# Patient Record
Sex: MALE | Race: OTHER | NOT HISPANIC OR LATINO
[De-identification: names, ages, dates, MRNs, and addresses within clinical notes are randomized per-mention and may not be internally consistent; named-entity substitution may affect disease eponyms.]

---

## 2017-01-06 ENCOUNTER — RECORD ABSTRACTING (OUTPATIENT)
Age: 52
End: 2017-01-06

## 2017-01-06 PROBLEM — Z00.00 ENCOUNTER FOR PREVENTIVE HEALTH EXAMINATION: Status: ACTIVE | Noted: 2017-01-06

## 2017-06-19 ENCOUNTER — OUTPATIENT (OUTPATIENT)
Dept: OUTPATIENT SERVICES | Facility: HOSPITAL | Age: 52
LOS: 1 days | Discharge: HOME | End: 2017-06-19

## 2017-06-28 DIAGNOSIS — E78.5 HYPERLIPIDEMIA, UNSPECIFIED: ICD-10-CM

## 2017-07-19 ENCOUNTER — OUTPATIENT (OUTPATIENT)
Dept: OUTPATIENT SERVICES | Facility: HOSPITAL | Age: 52
LOS: 1 days | Discharge: HOME | End: 2017-07-19

## 2017-07-19 DIAGNOSIS — M25.551 PAIN IN RIGHT HIP: ICD-10-CM

## 2017-07-19 DIAGNOSIS — M54.5 LOW BACK PAIN: ICD-10-CM

## 2018-10-06 ENCOUNTER — OUTPATIENT (OUTPATIENT)
Dept: OUTPATIENT SERVICES | Facility: HOSPITAL | Age: 53
LOS: 1 days | Discharge: HOME | End: 2018-10-06

## 2018-10-06 DIAGNOSIS — M54.16 RADICULOPATHY, LUMBAR REGION: ICD-10-CM

## 2019-11-03 ENCOUNTER — TRANSCRIPTION ENCOUNTER (OUTPATIENT)
Age: 54
End: 2019-11-03

## 2020-02-06 ENCOUNTER — APPOINTMENT (OUTPATIENT)
Dept: OTOLARYNGOLOGY | Facility: CLINIC | Age: 55
End: 2020-02-06

## 2020-03-27 ENCOUNTER — TRANSCRIPTION ENCOUNTER (OUTPATIENT)
Age: 55
End: 2020-03-27

## 2020-08-29 ENCOUNTER — EMERGENCY (EMERGENCY)
Facility: HOSPITAL | Age: 55
LOS: 0 days | Discharge: HOME | End: 2020-08-29
Attending: EMERGENCY MEDICINE | Admitting: STUDENT IN AN ORGANIZED HEALTH CARE EDUCATION/TRAINING PROGRAM
Payer: COMMERCIAL

## 2020-08-29 VITALS
OXYGEN SATURATION: 99 % | RESPIRATION RATE: 16 BRPM | TEMPERATURE: 98 F | SYSTOLIC BLOOD PRESSURE: 178 MMHG | DIASTOLIC BLOOD PRESSURE: 94 MMHG | HEART RATE: 58 BPM

## 2020-08-29 DIAGNOSIS — F17.200 NICOTINE DEPENDENCE, UNSPECIFIED, UNCOMPLICATED: ICD-10-CM

## 2020-08-29 DIAGNOSIS — N20.0 CALCULUS OF KIDNEY: ICD-10-CM

## 2020-08-29 DIAGNOSIS — R10.9 UNSPECIFIED ABDOMINAL PAIN: ICD-10-CM

## 2020-08-29 LAB
ALBUMIN SERPL ELPH-MCNC: 4.5 G/DL — SIGNIFICANT CHANGE UP (ref 3.5–5.2)
ALP SERPL-CCNC: 50 U/L — SIGNIFICANT CHANGE UP (ref 30–115)
ALT FLD-CCNC: 24 U/L — SIGNIFICANT CHANGE UP (ref 0–41)
ANION GAP SERPL CALC-SCNC: 13 MMOL/L — SIGNIFICANT CHANGE UP (ref 7–14)
APPEARANCE UR: CLEAR — SIGNIFICANT CHANGE UP
AST SERPL-CCNC: 26 U/L — SIGNIFICANT CHANGE UP (ref 0–41)
BACTERIA # UR AUTO: NEGATIVE — SIGNIFICANT CHANGE UP
BASOPHILS # BLD AUTO: 0.03 K/UL — SIGNIFICANT CHANGE UP (ref 0–0.2)
BASOPHILS NFR BLD AUTO: 0.4 % — SIGNIFICANT CHANGE UP (ref 0–1)
BILIRUB SERPL-MCNC: 0.5 MG/DL — SIGNIFICANT CHANGE UP (ref 0.2–1.2)
BILIRUB UR-MCNC: NEGATIVE — SIGNIFICANT CHANGE UP
BUN SERPL-MCNC: 22 MG/DL — HIGH (ref 10–20)
CALCIUM SERPL-MCNC: 9.7 MG/DL — SIGNIFICANT CHANGE UP (ref 8.5–10.1)
CHLORIDE SERPL-SCNC: 103 MMOL/L — SIGNIFICANT CHANGE UP (ref 98–110)
CO2 SERPL-SCNC: 22 MMOL/L — SIGNIFICANT CHANGE UP (ref 17–32)
COLOR SPEC: SIGNIFICANT CHANGE UP
CREAT SERPL-MCNC: 1.6 MG/DL — HIGH (ref 0.7–1.5)
DIFF PNL FLD: ABNORMAL
EOSINOPHIL # BLD AUTO: 0.04 K/UL — SIGNIFICANT CHANGE UP (ref 0–0.7)
EOSINOPHIL NFR BLD AUTO: 0.6 % — SIGNIFICANT CHANGE UP (ref 0–8)
EPI CELLS # UR: 0 /HPF — SIGNIFICANT CHANGE UP (ref 0–5)
GLUCOSE SERPL-MCNC: 95 MG/DL — SIGNIFICANT CHANGE UP (ref 70–99)
GLUCOSE UR QL: NEGATIVE — SIGNIFICANT CHANGE UP
HCT VFR BLD CALC: 41.6 % — LOW (ref 42–52)
HGB BLD-MCNC: 12.3 G/DL — LOW (ref 14–18)
HYALINE CASTS # UR AUTO: 1 /LPF — SIGNIFICANT CHANGE UP (ref 0–7)
IMM GRANULOCYTES NFR BLD AUTO: 0.3 % — SIGNIFICANT CHANGE UP (ref 0.1–0.3)
KETONES UR-MCNC: NEGATIVE — SIGNIFICANT CHANGE UP
LACTATE SERPL-SCNC: 1 MMOL/L — SIGNIFICANT CHANGE UP (ref 0.7–2)
LEUKOCYTE ESTERASE UR-ACNC: NEGATIVE — SIGNIFICANT CHANGE UP
LIDOCAIN IGE QN: 36 U/L — SIGNIFICANT CHANGE UP (ref 7–60)
LYMPHOCYTES # BLD AUTO: 1.71 K/UL — SIGNIFICANT CHANGE UP (ref 1.2–3.4)
LYMPHOCYTES # BLD AUTO: 25 % — SIGNIFICANT CHANGE UP (ref 20.5–51.1)
MCHC RBC-ENTMCNC: 19.2 PG — LOW (ref 27–31)
MCHC RBC-ENTMCNC: 29.6 G/DL — LOW (ref 32–37)
MCV RBC AUTO: 64.9 FL — LOW (ref 80–94)
MONOCYTES # BLD AUTO: 0.53 K/UL — SIGNIFICANT CHANGE UP (ref 0.1–0.6)
MONOCYTES NFR BLD AUTO: 7.8 % — SIGNIFICANT CHANGE UP (ref 1.7–9.3)
NEUTROPHILS # BLD AUTO: 4.5 K/UL — SIGNIFICANT CHANGE UP (ref 1.4–6.5)
NEUTROPHILS NFR BLD AUTO: 65.9 % — SIGNIFICANT CHANGE UP (ref 42.2–75.2)
NITRITE UR-MCNC: NEGATIVE — SIGNIFICANT CHANGE UP
NRBC # BLD: 0 /100 WBCS — SIGNIFICANT CHANGE UP (ref 0–0)
PH UR: 5.5 — SIGNIFICANT CHANGE UP (ref 5–8)
PLATELET # BLD AUTO: 200 K/UL — SIGNIFICANT CHANGE UP (ref 130–400)
POTASSIUM SERPL-MCNC: 4.6 MMOL/L — SIGNIFICANT CHANGE UP (ref 3.5–5)
POTASSIUM SERPL-SCNC: 4.6 MMOL/L — SIGNIFICANT CHANGE UP (ref 3.5–5)
PROT SERPL-MCNC: 6.9 G/DL — SIGNIFICANT CHANGE UP (ref 6–8)
PROT UR-MCNC: SIGNIFICANT CHANGE UP
RBC # BLD: 6.41 M/UL — HIGH (ref 4.7–6.1)
RBC # FLD: 17.7 % — HIGH (ref 11.5–14.5)
RBC CASTS # UR COMP ASSIST: 10 /HPF — HIGH (ref 0–4)
SODIUM SERPL-SCNC: 138 MMOL/L — SIGNIFICANT CHANGE UP (ref 135–146)
SP GR SPEC: 1.02 — SIGNIFICANT CHANGE UP (ref 1.01–1.02)
UROBILINOGEN FLD QL: SIGNIFICANT CHANGE UP
WBC # BLD: 6.83 K/UL — SIGNIFICANT CHANGE UP (ref 4.8–10.8)
WBC # FLD AUTO: 6.83 K/UL — SIGNIFICANT CHANGE UP (ref 4.8–10.8)
WBC UR QL: 1 /HPF — SIGNIFICANT CHANGE UP (ref 0–5)

## 2020-08-29 PROCEDURE — 99285 EMERGENCY DEPT VISIT HI MDM: CPT

## 2020-08-29 PROCEDURE — 74177 CT ABD & PELVIS W/CONTRAST: CPT | Mod: 26

## 2020-08-29 RX ORDER — KETOROLAC TROMETHAMINE 30 MG/ML
1 SYRINGE (ML) INJECTION
Qty: 9 | Refills: 0
Start: 2020-08-29 | End: 2020-08-31

## 2020-08-29 RX ORDER — ONDANSETRON 8 MG/1
1 TABLET, FILM COATED ORAL
Qty: 5 | Refills: 0
Start: 2020-08-29 | End: 2020-08-31

## 2020-08-29 RX ORDER — MORPHINE SULFATE 50 MG/1
8 CAPSULE, EXTENDED RELEASE ORAL ONCE
Refills: 0 | Status: DISCONTINUED | OUTPATIENT
Start: 2020-08-29 | End: 2020-08-29

## 2020-08-29 RX ORDER — SODIUM CHLORIDE 9 MG/ML
1000 INJECTION INTRAMUSCULAR; INTRAVENOUS; SUBCUTANEOUS ONCE
Refills: 0 | Status: COMPLETED | OUTPATIENT
Start: 2020-08-29 | End: 2020-08-29

## 2020-08-29 RX ORDER — MORPHINE SULFATE 50 MG/1
4 CAPSULE, EXTENDED RELEASE ORAL ONCE
Refills: 0 | Status: DISCONTINUED | OUTPATIENT
Start: 2020-08-29 | End: 2020-08-29

## 2020-08-29 RX ORDER — TAMSULOSIN HYDROCHLORIDE 0.4 MG/1
1 CAPSULE ORAL
Qty: 10 | Refills: 0
Start: 2020-08-29 | End: 2020-09-07

## 2020-08-29 RX ADMIN — MORPHINE SULFATE 4 MILLIGRAM(S): 50 CAPSULE, EXTENDED RELEASE ORAL at 17:41

## 2020-08-29 RX ADMIN — SODIUM CHLORIDE 1000 MILLILITER(S): 9 INJECTION INTRAMUSCULAR; INTRAVENOUS; SUBCUTANEOUS at 17:41

## 2020-08-29 RX ADMIN — SODIUM CHLORIDE 1000 MILLILITER(S): 9 INJECTION INTRAMUSCULAR; INTRAVENOUS; SUBCUTANEOUS at 20:17

## 2020-08-29 RX ADMIN — MORPHINE SULFATE 8 MILLIGRAM(S): 50 CAPSULE, EXTENDED RELEASE ORAL at 18:43

## 2020-08-29 NOTE — ED PROVIDER NOTE - CARE PROVIDER_API CALL
Gricelda Holder  UROLOGY  [address] [place] [address], [facility] [address]  [place] [address]  Phone: [phone]  Fax: [phone]  Follow Up Time: 1-3 Days

## 2020-08-29 NOTE — ED PROVIDER NOTE - OBJECTIVE STATEMENT
56 yo male with hx of kidney stones presenting with dull R flank pain with burning pain radiating to groin starting 2 hours prior while pt was working at his garage on his back, worse with movement. denies dysuria, hematuria, fever, cp, sob, abd pain, penile discharge. Feels different than prior kidney stones.

## 2020-08-29 NOTE — ED PROVIDER NOTE - ATTENDING CONTRIBUTION TO CARE
56 yo m hx nephrolithiasis here for R flank pain. sx started 2 hours ago. some radiation to groin.  no dysuria/ hematuria. no nausea, vomiting, cp, sob.     vss  gen- NAD, aaox3  card-rrr  lungs-ctab, no wheezing or rhonchi  abd-sntnd, no guarding or rebound, mild R flank TTP but no CVAT  neuro- full str/sensation, cn ii-xii grossly intact, normal coordination and gait    r/o kidney stone, less likely gallbladder pathology  will get belly labs, ctap, ua, will provide supportive care, serial exam and ED observation period 56 yo m hx nephrolithiasis here for R flank pain. sx started 2 hours ago. some radiation to groin.  no dysuria/ hematuria. no nausea, vomiting, cp, sob. no fever/chills.    vss  gen- NAD, aaox3  card-rrr  lungs-ctab, no wheezing or rhonchi  abd-sntnd, no guarding or rebound, mild R flank TTP but no CVAT  neuro- full str/sensation, cn ii-xii grossly intact, normal coordination and gait    r/o kidney stone, less likely gallbladder pathology  will get belly labs, ctap, ua, will provide supportive care, serial exam and ED observation period

## 2020-08-29 NOTE — ED PROVIDER NOTE - NS ED ROS FT
Constitutional:  see HPI  Head:  no headache, dizziness, loss of consciousness  Eyes:  no visual changes; no eye pain, redness, or discharge  ENMT:  no ear pain or discharge; no hearing problems; no mouth or throat sores or lesions; no throat pain  Cardiac: no chest pain, tachycardia or palpitations  Respiratory: no cough, wheezing, shortness of breath, chest tightness, or trouble breathing  GI: flank pain  :  no dysuria, frequency, or burning with urination; no change in urine output  MS: no myalgias, muscle weakness, joint pain,or  injury; no joint swelling  Neuro: no weakness; no numbness or tingling; no seizure  Skin:  no rashes or color changes; no lacerations or abrasions

## 2020-08-29 NOTE — ED PROVIDER NOTE - PROGRESS NOTE DETAILS
POCUS kidneys no hydro CO- pt endorsed to Dr. Smith- pending CTAP, reassess discussed results with patient and return precautions for fever/chills, n/v, refractory pain to medications and will f/u with urologist Dr. Holder this week. Patient denies any complaints at this time. I called patient over the phone number provided to follow up patient. Patient stated that he had passed the stone this afternoon and had collected for follow up with Dr. Zamudio. Patient stated that, he did not need any more pain medication after he left the hospital. Patient stated that he did not take any pain medications after he left the hospital, pain had resolved. patient denies any symptoms at this time. I had instructed patient not to take any more toradol and to follow up with PMD and urologist, he verbalized understanding and agreed. Patient was instructed in detail to adequately orally hydrate and to follow up as outpatient. Patient verbalized understanding and agreed.

## 2020-08-29 NOTE — ED PROVIDER NOTE - PHYSICAL EXAMINATION
CONSTITUTIONAL: Well-developed; well-nourished; in no acute distress.   SKIN: warm, dry  HEAD: Normocephalic; atraumatic.  EYES: PERRL, EOMI, normal sclera and conjunctiva   ENT: No nasal discharge; airway clear.  NECK: Supple; non tender.  CARD: S1, S2 normal; no murmurs, gallops, or rubs. Regular rate and rhythm.   RESP: No wheezes, rales or rhonchi.  ABD: soft ntnd, mild TTP R flank, no CVA tenderness  EXT: Normal ROM.  No clubbing, cyanosis or edema.   LYMPH: No acute cervical adenopathy.  NEURO: Alert, oriented, grossly unremarkable  PSYCH: Cooperative, appropriate. CONSTITUTIONAL: Well-developed; well-nourished; in no acute distress.   SKIN: warm, dry  HEAD: Normocephalic; atraumatic.  EYES: PERRL, EOMI, normal sclera and conjunctiva   ENT: No nasal discharge; airway clear.  NECK: Supple; non tender.  CARD: S1, S2 normal; no murmurs, gallops, or rubs. Regular rate and rhythm.   RESP: No wheezes, rales or rhonchi.  ABD: soft ntnd, mild TTP R flank, no CVA tenderness  : testicles nontender to palpation, no high riding testicles, no external lesions  EXT: Normal ROM.  No clubbing, cyanosis or edema.   LYMPH: No acute cervical adenopathy.  NEURO: Alert, oriented, grossly unremarkable  PSYCH: Cooperative, appropriate.

## 2020-08-29 NOTE — ED ADULT NURSE NOTE - NSIMPLEMENTINTERV_GEN_ALL_ED
Implemented All Universal Safety Interventions:  [place] to call system. Call bell, personal items and telephone within reach. Instruct patient to call for assistance. Room bathroom lighting operational. Non-slip footwear when patient is off stretcher. Physically safe environment: no spills, clutter or unnecessary equipment. Stretcher in lowest position, wheels locked, appropriate side rails in place.

## 2020-08-29 NOTE — ED PROVIDER NOTE - PATIENT PORTAL LINK FT
You can access the FollowMyHealth Patient Portal offered by [facility] by registering at the following website: [email or web address]. By joining [facility]’s FollowMyHealth portal, you will also be able to view your health information using other applications (apps) compatible with our system.

## 2020-08-31 LAB
CULTURE RESULTS: SIGNIFICANT CHANGE UP
SPECIMEN SOURCE: SIGNIFICANT CHANGE UP

## 2020-09-15 ENCOUNTER — APPOINTMENT (OUTPATIENT)
Dept: UROLOGY | Facility: CLINIC | Age: 55
End: 2020-09-15
Payer: COMMERCIAL

## 2020-09-15 VITALS — BODY MASS INDEX: 32.29 KG/M2 | HEIGHT: 69 IN | TEMPERATURE: 96.2 F | WEIGHT: 218 LBS

## 2020-09-15 DIAGNOSIS — N20.1 CALCULUS OF URETER: ICD-10-CM

## 2020-09-15 DIAGNOSIS — R97.20 ELEVATED PROSTATE, SPECIFIC ANTIGEN [PSA]: ICD-10-CM

## 2020-09-15 DIAGNOSIS — N20.0 CALCULUS OF KIDNEY: ICD-10-CM

## 2020-09-15 DIAGNOSIS — K57.32 DIVERTICULITIS OF LARGE INTESTINE W/OUT PERFORATION OR ABSCESS W/OUT BLEEDING: ICD-10-CM

## 2020-09-15 PROCEDURE — 99204 OFFICE O/P NEW MOD 45 MIN: CPT

## 2020-09-15 NOTE — HISTORY OF PRESENT ILLNESS
[None] : no symptoms [FreeTextEntry1] : 55 year old with history of nephrolithiasis 4 episodes in the last 10 years, was always able to pass stone on his own. Has not seen a urologist on over 2 years. He presented to ER for right flank radiating to groin pain omn 8/29/20 and CT was done which showed distal obstructing 5 mm right ureteral stone in the UVJ resulting in right-sided hydroureteronephrosis. Patient was able to stone the following day at home. In addition, patient has elevated PSA and referred by PMD. PSA is 4.6 ng/ml on 7/31/2020, and PSA in 2016 was 4.15 ng/ml and patient never followed up with urologist. Non smoker. Denies family history of urological cancers. Voiding with a good flow, satisfied with urinary condition.

## 2020-09-15 NOTE — PHYSICAL EXAM
[General Appearance - Well Developed] : well developed [General Appearance - Well Nourished] : well nourished [Normal Appearance] : normal appearance [Well Groomed] : well groomed [General Appearance - In No Acute Distress] : no acute distress [Edema] : no peripheral edema [Respiration, Rhythm And Depth] : normal respiratory rhythm and effort [Abdomen Soft] : soft [Exaggerated Use Of Accessory Muscles For Inspiration] : no accessory muscle use [Abdomen Tenderness] : non-tender [Costovertebral Angle Tenderness] : no ~M costovertebral angle tenderness [] : no rash [Normal Station and Gait] : the gait and station were normal for the patient's age [Oriented To Time, Place, And Person] : oriented to person, place, and time [No Focal Deficits] : no focal deficits [Affect] : the affect was normal [Mood] : the mood was normal [Not Anxious] : not anxious [Prostate Tenderness] : the prostate was not tender [No Prostate Nodules] : no prostate nodules [Prostate Size ___ (0-4)] : prostate size [unfilled] (scale: 0-4)

## 2020-09-15 NOTE — ASSESSMENT
[FreeTextEntry1] : Stone will be send out for calculus analysis. Discussed with patient importance of metabolic workup to help with prevention and will get serum parathyroid hormone, calcium levels, and uric acid levels. He will also complete a 24 hour urine stone risk assessment prior to f/u visit. As per elevated PSA, he will repeat PSA and results will be discussed at follow up visit in 3 weeks. \par

## 2020-09-16 ENCOUNTER — TRANSCRIPTION ENCOUNTER (OUTPATIENT)
Age: 55
End: 2020-09-16

## 2020-09-21 LAB — NIDUS STONE QN: NORMAL

## 2020-10-02 LAB
CALCIUM SERPL-MCNC: 9.5 MG/DL
CALCIUM SERPL-MCNC: 9.8 MG/DL
PARATHYROID HORMONE INTACT: 34 PG/ML
PSA SERPL-MCNC: 6.64 NG/ML
URATE SERPL-MCNC: 7.1 MG/DL

## 2020-10-12 ENCOUNTER — TRANSCRIPTION ENCOUNTER (OUTPATIENT)
Age: 55
End: 2020-10-12

## 2020-10-20 ENCOUNTER — APPOINTMENT (OUTPATIENT)
Dept: UROLOGY | Facility: CLINIC | Age: 55
End: 2020-10-20
Payer: COMMERCIAL

## 2020-10-20 DIAGNOSIS — N20.0 CALCULUS OF KIDNEY: ICD-10-CM

## 2020-10-20 DIAGNOSIS — N13.8 BENIGN PROSTATIC HYPERPLASIA WITH LOWER URINARY TRACT SYMPMS: ICD-10-CM

## 2020-10-20 DIAGNOSIS — N40.1 BENIGN PROSTATIC HYPERPLASIA WITH LOWER URINARY TRACT SYMPMS: ICD-10-CM

## 2020-10-20 PROCEDURE — 99214 OFFICE O/P EST MOD 30 MIN: CPT

## 2020-10-20 PROCEDURE — 99072 ADDL SUPL MATRL&STAF TM PHE: CPT

## 2020-10-20 NOTE — PHYSICAL EXAM
[General Appearance - In No Acute Distress] : no acute distress [Costovertebral Angle Tenderness] : no ~M costovertebral angle tenderness [] : no respiratory distress [Respiration, Rhythm And Depth] : normal respiratory rhythm and effort [Oriented To Time, Place, And Person] : oriented to person, place, and time [Affect] : the affect was normal [Mood] : the mood was normal [Not Anxious] : not anxious [Normal Station and Gait] : the gait and station were normal for the patient's age

## 2020-10-20 NOTE — HISTORY OF PRESENT ILLNESS
[FreeTextEntry1] : 55-year-old with history of elevated PSA. His PSA 6.6. He has no frequency, no urgency, no flank pain and normal stream. There is no dysuria or hematuria.\par \par Recently passed a stone. Stone analysis shows calcium oxalate. Serum parathyroid hormone, calcium and uric acid normal. 24-hour urine shows low volume and low citrate

## 2020-10-20 NOTE — REVIEW OF SYSTEMS
[Feeling Poorly] : not feeling poorly [Red Eyes] : eyes not red [Nasal Discharge] : no nasal discharge [Sore Throat] : no sore throat [Chest Pain] : no chest pain [Shortness Of Breath] : no shortness of breath [Cough] : no cough [Constipation] : no constipation [Diarrhea] : no diarrhea [Dysuria] : no dysuria [Joint Swelling] : no joint swelling [Skin Wound] : no skin wound [Confused] : no confusion [Change In Personality] : no personality change [Muscle Weakness] : no muscle weakness [Easy Bleeding] : no tendency for easy bleeding

## 2020-10-20 NOTE — ASSESSMENT
[FreeTextEntry1] : Elevated PSA. I discussed with patient importance to make sure is no significant prostate cancer. Recommend transrectal ultrasound and biopsy with risk of sepsis and bleeding. Discuss transrectal and transperineal route. He prefers the transrectal route after full discussion.  No NSAIDs one week prior. Fleets and antibiotic prep\par \par Regarding stones will increase water intake to 812 8 ounce glasses of water per day and squeeze lemon juice into the water

## 2020-11-11 ENCOUNTER — LABORATORY RESULT (OUTPATIENT)
Age: 55
End: 2020-11-11

## 2020-11-11 ENCOUNTER — APPOINTMENT (OUTPATIENT)
Dept: UROLOGY | Facility: CLINIC | Age: 55
End: 2020-11-11
Payer: COMMERCIAL

## 2020-11-11 LAB — BACTERIA UR CULT: NORMAL

## 2020-11-11 PROCEDURE — 55700: CPT

## 2020-11-11 PROCEDURE — 76872 US TRANSRECTAL: CPT

## 2020-11-11 PROCEDURE — 93975 VASCULAR STUDY: CPT

## 2020-11-11 PROCEDURE — 99072 ADDL SUPL MATRL&STAF TM PHE: CPT

## 2020-11-24 ENCOUNTER — APPOINTMENT (OUTPATIENT)
Dept: UROLOGY | Facility: CLINIC | Age: 55
End: 2020-11-24
Payer: COMMERCIAL

## 2020-11-24 VITALS
DIASTOLIC BLOOD PRESSURE: 76 MMHG | HEART RATE: 64 BPM | TEMPERATURE: 98.9 F | WEIGHT: 216 LBS | HEIGHT: 69 IN | BODY MASS INDEX: 31.99 KG/M2 | SYSTOLIC BLOOD PRESSURE: 149 MMHG

## 2020-11-24 DIAGNOSIS — R97.20 ELEVATED PROSTATE, SPECIFIC ANTIGEN [PSA]: ICD-10-CM

## 2020-11-24 PROCEDURE — 99214 OFFICE O/P EST MOD 30 MIN: CPT

## 2020-11-24 NOTE — REVIEW OF SYSTEMS
[Feeling Poorly] : not feeling poorly [Feeling Tired] : not feeling tired [Nasal Discharge] : no nasal discharge [Chest Pain] : no chest pain [Cough] : no cough [Constipation] : no constipation [Diarrhea] : no diarrhea [Dysuria] : no dysuria [Joint Swelling] : no joint swelling [Skin Wound] : no skin wound [Confused] : no confusion [Feelings Of Weakness] : no feelings of weakness [Easy Bleeding] : no tendency for easy bleeding

## 2020-11-24 NOTE — ASSESSMENT
[FreeTextEntry1] : Extensive prostate cancer in 8/12 cores. Highest grade his group 3. Recommend MRI of the pelvis for staging along with bone scan. Discussed with him already options if localized including robotic prostatectomy and radiation. Return to office to discuss results and more detailed discussion of treatment

## 2020-11-24 NOTE — HISTORY OF PRESENT ILLNESS
[FreeTextEntry1] : 55-year-old with PSA of 6.6.   Prostate biopsy shows prostate cancer grade group 3 in one core, grade group to pain 5 cores, grade group one into cores.  Patient has no fever, no frequency, no urgency, no diminished stream.

## 2020-11-24 NOTE — PHYSICAL EXAM
[General Appearance - In No Acute Distress] : no acute distress [] : no respiratory distress [Respiration, Rhythm And Depth] : normal respiratory rhythm and effort [Oriented To Time, Place, And Person] : oriented to person, place, and time [Affect] : the affect was normal [Mood] : the mood was normal [Not Anxious] : not anxious [Normal Station and Gait] : the gait and station were normal for the patient's age

## 2020-11-30 ENCOUNTER — OUTPATIENT (OUTPATIENT)
Dept: OUTPATIENT SERVICES | Facility: HOSPITAL | Age: 55
LOS: 1 days | Discharge: HOME | End: 2020-11-30

## 2020-11-30 ENCOUNTER — LABORATORY RESULT (OUTPATIENT)
Age: 55
End: 2020-11-30

## 2020-11-30 DIAGNOSIS — Z11.59 ENCOUNTER FOR SCREENING FOR OTHER VIRAL DISEASES: ICD-10-CM

## 2020-11-30 LAB — CREAT SERPL-MCNC: 1.3 MG/DL

## 2020-12-03 ENCOUNTER — OUTPATIENT (OUTPATIENT)
Dept: OUTPATIENT SERVICES | Facility: HOSPITAL | Age: 55
LOS: 1 days | Discharge: HOME | End: 2020-12-03
Payer: COMMERCIAL

## 2020-12-03 DIAGNOSIS — C61 MALIGNANT NEOPLASM OF PROSTATE: ICD-10-CM

## 2020-12-10 ENCOUNTER — OUTPATIENT (OUTPATIENT)
Dept: OUTPATIENT SERVICES | Facility: HOSPITAL | Age: 55
LOS: 1 days | Discharge: HOME | End: 2020-12-10
Payer: COMMERCIAL

## 2020-12-10 ENCOUNTER — RESULT REVIEW (OUTPATIENT)
Age: 55
End: 2020-12-10

## 2020-12-10 DIAGNOSIS — C61 MALIGNANT NEOPLASM OF PROSTATE: ICD-10-CM

## 2020-12-10 PROCEDURE — 78803 RP LOCLZJ TUM SPECT 1 AREA: CPT | Mod: 26

## 2020-12-10 PROCEDURE — 78306 BONE IMAGING WHOLE BODY: CPT | Mod: 26

## 2021-01-04 ENCOUNTER — OUTPATIENT (OUTPATIENT)
Dept: OUTPATIENT SERVICES | Facility: HOSPITAL | Age: 56
LOS: 1 days | Discharge: HOME | End: 2021-01-04

## 2021-01-04 ENCOUNTER — LABORATORY RESULT (OUTPATIENT)
Age: 56
End: 2021-01-04

## 2021-01-04 DIAGNOSIS — Z11.59 ENCOUNTER FOR SCREENING FOR OTHER VIRAL DISEASES: ICD-10-CM

## 2021-01-07 ENCOUNTER — OUTPATIENT (OUTPATIENT)
Dept: OUTPATIENT SERVICES | Facility: HOSPITAL | Age: 56
LOS: 1 days | Discharge: HOME | End: 2021-01-07

## 2021-01-07 DIAGNOSIS — C61 MALIGNANT NEOPLASM OF PROSTATE: ICD-10-CM

## 2021-01-07 PROCEDURE — 72197 MRI PELVIS W/O & W/DYE: CPT | Mod: 26

## 2021-01-12 ENCOUNTER — APPOINTMENT (OUTPATIENT)
Dept: UROLOGY | Facility: CLINIC | Age: 56
End: 2021-01-12
Payer: COMMERCIAL

## 2021-01-12 VITALS — BODY MASS INDEX: 31.99 KG/M2 | HEIGHT: 69 IN | TEMPERATURE: 98.2 F | WEIGHT: 216 LBS

## 2021-01-12 DIAGNOSIS — R97.20 ELEVATED PROSTATE, SPECIFIC ANTIGEN [PSA]: ICD-10-CM

## 2021-01-12 PROCEDURE — 99214 OFFICE O/P EST MOD 30 MIN: CPT

## 2021-01-12 PROCEDURE — 99072 ADDL SUPL MATRL&STAF TM PHE: CPT

## 2021-01-12 NOTE — ASSESSMENT
[FreeTextEntry1] : Clinical stage TIII, N0 M0 prostate cancer, grade group 3. This is a dangerous cancer and I do not recommend active surveillance. I discussed with him various treatment options including radical prostatectomy both open and robotically approaches. I discussed failure to cure, bleeding, cardiopulmonary affects, scarring requiring reoperation, incontinence, sexual side effects including dry ejaculation, erectile dysfunction. I discussed subsequent therapies if recurrences occur including radiation and hormonal treatments.\par \par I discussed with him various forms of radiation therapy including imrt, SBRT, seeds likely with temporary hormonal manipulation. I discussed with him failure to cure, proctitis, scarring requiring operation, incontinence, permanent bowel affects, sexual side effects including diminished injected for a volume, erectile dysfunction. I discussed with him subsequent therapies if recurrences occur including hormonal manipulation.\par \par I have offered patient to go for second opinions and he is already decided that he wants to see Dr. Juares at [facility].  Records given to patient for this opinion

## 2021-01-12 NOTE — PHYSICAL EXAM
[General Appearance - In No Acute Distress] : no acute distress [] : no respiratory distress [Oriented To Time, Place, And Person] : oriented to person, place, and time [Affect] : the affect was normal [Mood] : the mood was normal [Not Anxious] : not anxious [Normal Station and Gait] : the gait and station were normal for the patient's age

## 2021-01-12 NOTE — REVIEW OF SYSTEMS
[Feeling Poorly] : not feeling poorly [Feeling Tired] : not feeling tired [Discharge From Eyes] : no purulent discharge from the eyes [Nasal Discharge] : no nasal discharge [Chest Pain] : no chest pain [Cough] : no cough [Constipation] : no constipation [Dysuria] : no dysuria

## 2021-01-12 NOTE — HISTORY OF PRESENT ILLNESS
[FreeTextEntry1] : 55-year-old with prostate cancer grade group 3,PSA 6.6, MRI shows no adenopathy, it does show capsular extension and involvement of the left neurovascular bundle, bone scan is negative for metastases.No significant urinary complaint [Urinary Retention] : no urinary retention [Urinary Frequency] : no urinary frequency [Urinary Urgency] : no urinary urgency [Hematuria - Gross] : no gross hematuria [Dysuria] : no dysuria [Flank Pain] : no flank pain

## 2021-03-01 ENCOUNTER — APPOINTMENT (OUTPATIENT)
Dept: UROLOGY | Facility: CLINIC | Age: 56
End: 2021-03-01
Payer: COMMERCIAL

## 2021-03-01 VITALS — TEMPERATURE: 98.9 F | BODY MASS INDEX: 31.99 KG/M2 | HEIGHT: 69 IN | WEIGHT: 216 LBS

## 2021-03-01 PROCEDURE — 99072 ADDL SUPL MATRL&STAF TM PHE: CPT

## 2021-03-01 PROCEDURE — 99214 OFFICE O/P EST MOD 30 MIN: CPT

## 2021-03-02 NOTE — PHYSICAL EXAM
[General Appearance - Well Developed] : well developed [General Appearance - Well Nourished] : well nourished [Normal Appearance] : normal appearance [Well Groomed] : well groomed [General Appearance - In No Acute Distress] : no acute distress [] : no respiratory distress [Oriented To Time, Place, And Person] : oriented to person, place, and time [Affect] : the affect was normal [Mood] : the mood was normal [Not Anxious] : not anxious [Normal Station and Gait] : the gait and station were normal for the patient's age [No Focal Deficits] : no focal deficits

## 2021-03-02 NOTE — HISTORY OF PRESENT ILLNESS
[None] : no symptoms [FreeTextEntry1] : 54 yo with intermediate risk prostate cancer\par \par PSA 6.64 ng/ml 10/2020\par \par [place] 3+4 and 4+3 11/2020\par LEFT lateral base - 3+4 with 60% \par LEFT base              3+4 with 85%\par Left lateral mid        3+4 with 80%\par left mid                    4+3 with 80%\par left apex                  3+4 with 70%\par left lateral apex        3+4 with 70%\par \par MRI pelvis - pirads 4 with JACQUIE on the LEFT peripheral zone\par \par bone scan 12/10/2020 - no metastatic spread\par \par \par the patient has seen Dr. Holder and Dr. Juares \par \par IPSS - 6\par IIEF - 20\par \par patient has noted curvature to the right of the penis - describes it as approximately 15%\par \par \par

## 2021-03-02 NOTE — ASSESSMENT
[FreeTextEntry1] : 54 yo with intermediate risk prostate cancer \par PSA 6.64 ng/ml\par Nath[place] 3+4 and 4+3\par MRI with suspicious JACQUIE - \par \par discussed the findings in detail\par explained fully the options\par explained the role of robotic radical prostatectomy and the complications\par \par I spent 40 minutes with the patient and his wife\par I provided information regarding the surgery and the potential outcomes\par I discussed the role of a multimodal treatment approach\par \par all questions answered\par \par - the patient was offered a consultation with Dr. Yepez\par - he wants to go to [place] - \par I provided Dr. Everardo Tovar at [facility]\par all questions answered\par

## 2021-03-16 ENCOUNTER — APPOINTMENT (OUTPATIENT)
Dept: UROLOGY | Facility: CLINIC | Age: 56
End: 2021-03-16
Payer: COMMERCIAL

## 2021-03-16 PROCEDURE — 99214 OFFICE O/P EST MOD 30 MIN: CPT

## 2021-03-16 PROCEDURE — 99072 ADDL SUPL MATRL&STAF TM PHE: CPT

## 2021-03-16 NOTE — ASSESSMENT
[FreeTextEntry1] : Today we discussed the natural history of localized prostate cancer, and the heterogeneous biology of this malignancy.  We discussed the fact that many prostate cancers are slow growing and unlikely to metastasize or cause death, whereas others can be life threatening.  We reviewed risk stratification, staging, Juniata scoring, and PSA levels as they pertain to risk.  \par \par All treatment options were reviewed.  This included active surveillance, androgen deprivation, emerging options such as focal therapy/HIFU/cryotherapy, radiation options (including IMRT, SBRT, brachytherapy) and surgical options (open vs. multiport and single port robotic surgery, nerve vs. non-nerve sparing).  Oncologic outcomes were compared and contrasted.  Risks of biochemical and clinical recurrence discussed.  Risks of needing adjuvant or salvage treatments reviewed.  We discussed the risks of secondary malignancy after radiation.  We discussed the opportunity for pathological staging with surgery vs. other options.  We discussed the possibility of positive margins, treatment failure, cancer recurrence and cancer-related death even with treatment. \par \par All potential side effects of treatment were reviewed including, but not limited to: short term or permanent stress urinary incontinence and/or short term or permanent erectile dysfunction, penile shortening, rectal symptoms/pain, perineal pain, and other side effects. \par \par All potential complications of treatment and surgery were reviewed including, but not limited to: risks of conversion from MIS to open surgery discussed, bleeding//life-threatening hemorrhage, rectal injury requiring colostomy or delayed recognition leading to fistula, vascular/bowel/adjacent visceral organ injury, trocar/access injury, the possibility of recognized vs. unrecognized/delayed-recognition injury, risks of thermal/blunt/sharp/retraction injury, risks of DVT, PE, MI, death, risks of cardiopulmonary/anesthesia related complications, positional injury, infection/collection/abscess, wound complications/dehiscence/seroma/cellulitis, urinoma/fistula, ureteral injury/obstruction, bladder neck contracture, penile shortening, meatal stenosis, urethral stricture, lymphocele, obturator nerve injury, prolonged anastomotic leak, and other complications. \par \par \par \par \par \par \par \par \par \par

## 2021-03-16 NOTE — HISTORY OF PRESENT ILLNESS
[FreeTextEntry1] : CC: prostate cancer \par \par \par PSA 6.64 ng/ml 10/2020\par MRI with JACQUIE and NVB involvement \par \par [place] 3+4 and 4+3 11/2020\par Left side diffuse \par bone scan 12/10/2020 negative \par \par Mild to moderate ED, erections "7/10" \par Small prostate 22 g \par \par FAMHX: negative CAP \par SURG: denies; no abdominal \par ALL: NKDA\par SOCIAL: auto-body, , former smoker

## 2021-03-23 ENCOUNTER — RESULT REVIEW (OUTPATIENT)
Age: 56
End: 2021-03-23

## 2021-03-24 ENCOUNTER — OUTPATIENT (OUTPATIENT)
Dept: OUTPATIENT SERVICES | Facility: HOSPITAL | Age: 56
LOS: 1 days | End: 2021-03-24

## 2021-03-24 DIAGNOSIS — C61 MALIGNANT NEOPLASM OF PROSTATE: ICD-10-CM

## 2021-03-24 LAB — SURGICAL PATHOLOGY STUDY: SIGNIFICANT CHANGE UP

## 2021-04-08 LAB — BACTERIA UR CULT: NORMAL

## 2021-04-09 ENCOUNTER — OUTPATIENT (OUTPATIENT)
Dept: OUTPATIENT SERVICES | Facility: HOSPITAL | Age: 56
LOS: 1 days | Discharge: HOME | End: 2021-04-09

## 2021-04-09 DIAGNOSIS — Z11.59 ENCOUNTER FOR SCREENING FOR OTHER VIRAL DISEASES: ICD-10-CM

## 2021-04-11 ENCOUNTER — TRANSCRIPTION ENCOUNTER (OUTPATIENT)
Age: 56
End: 2021-04-11

## 2021-04-12 ENCOUNTER — OUTPATIENT (OUTPATIENT)
Dept: OUTPATIENT SERVICES | Facility: HOSPITAL | Age: 56
LOS: 1 days | Discharge: HOME | End: 2021-04-12
Payer: COMMERCIAL

## 2021-04-12 ENCOUNTER — RESULT REVIEW (OUTPATIENT)
Age: 56
End: 2021-04-12

## 2021-04-12 ENCOUNTER — APPOINTMENT (OUTPATIENT)
Dept: UROLOGY | Facility: HOSPITAL | Age: 56
End: 2021-04-12

## 2021-04-12 VITALS
HEART RATE: 63 BPM | WEIGHT: 216.49 LBS | RESPIRATION RATE: 18 BRPM | SYSTOLIC BLOOD PRESSURE: 156 MMHG | DIASTOLIC BLOOD PRESSURE: 93 MMHG | TEMPERATURE: 97 F | OXYGEN SATURATION: 97 % | HEIGHT: 69 IN

## 2021-04-12 LAB
ANION GAP SERPL CALC-SCNC: 8 MMOL/L — SIGNIFICANT CHANGE UP (ref 5–17)
BASOPHILS # BLD AUTO: 0.09 K/UL — SIGNIFICANT CHANGE UP (ref 0–0.2)
BASOPHILS NFR BLD AUTO: 0.9 % — SIGNIFICANT CHANGE UP (ref 0–2)
BLD GP AB SCN SERPL QL: NEGATIVE — SIGNIFICANT CHANGE UP
BUN SERPL-MCNC: 18 MG/DL — SIGNIFICANT CHANGE UP (ref 7–23)
CALCIUM SERPL-MCNC: 8.3 MG/DL — LOW (ref 8.4–10.5)
CHLORIDE SERPL-SCNC: 107 MMOL/L — SIGNIFICANT CHANGE UP (ref 96–108)
CO2 SERPL-SCNC: 23 MMOL/L — SIGNIFICANT CHANGE UP (ref 22–31)
CREAT SERPL-MCNC: 1.31 MG/DL — HIGH (ref 0.5–1.3)
EOSINOPHIL # BLD AUTO: 0 K/UL — SIGNIFICANT CHANGE UP (ref 0–0.5)
EOSINOPHIL NFR BLD AUTO: 0 % — SIGNIFICANT CHANGE UP (ref 0–6)
GLUCOSE SERPL-MCNC: 116 MG/DL — HIGH (ref 70–99)
HCT VFR BLD CALC: 34.7 % — LOW (ref 39–50)
HGB BLD-MCNC: 10.4 G/DL — LOW (ref 13–17)
LYMPHOCYTES # BLD AUTO: 0.58 K/UL — LOW (ref 1–3.3)
LYMPHOCYTES # BLD AUTO: 6.1 % — LOW (ref 13–44)
MCHC RBC-ENTMCNC: 19 PG — LOW (ref 27–34)
MCHC RBC-ENTMCNC: 30 GM/DL — LOW (ref 32–36)
MCV RBC AUTO: 63.3 FL — LOW (ref 80–100)
MONOCYTES # BLD AUTO: 0.58 K/UL — SIGNIFICANT CHANGE UP (ref 0–0.9)
MONOCYTES NFR BLD AUTO: 6.1 % — SIGNIFICANT CHANGE UP (ref 2–14)
NEUTROPHILS # BLD AUTO: 8.2 K/UL — HIGH (ref 1.8–7.4)
NEUTROPHILS NFR BLD AUTO: 85.1 % — HIGH (ref 43–77)
PLATELET # BLD AUTO: 177 K/UL — SIGNIFICANT CHANGE UP (ref 150–400)
POTASSIUM SERPL-MCNC: 4.9 MMOL/L — SIGNIFICANT CHANGE UP (ref 3.5–5.3)
POTASSIUM SERPL-SCNC: 4.9 MMOL/L — SIGNIFICANT CHANGE UP (ref 3.5–5.3)
RBC # BLD: 5.48 M/UL — SIGNIFICANT CHANGE UP (ref 4.2–5.8)
RBC # FLD: 16.4 % — HIGH (ref 10.3–14.5)
RH IG SCN BLD-IMP: POSITIVE — SIGNIFICANT CHANGE UP
SARS-COV-2 N GENE NPH QL NAA+PROBE: NOT DETECTED
SODIUM SERPL-SCNC: 138 MMOL/L — SIGNIFICANT CHANGE UP (ref 135–145)
WBC # BLD: 9.54 K/UL — SIGNIFICANT CHANGE UP (ref 3.8–10.5)
WBC # FLD AUTO: 9.54 K/UL — SIGNIFICANT CHANGE UP (ref 3.8–10.5)

## 2021-04-12 PROCEDURE — 88305 TISSUE EXAM BY PATHOLOGIST: CPT | Mod: 26

## 2021-04-12 PROCEDURE — 55866 LAPS SURG PRST8ECT RPBIC RAD: CPT | Mod: AS

## 2021-04-12 PROCEDURE — S2900 ROBOTIC SURGICAL SYSTEM: CPT

## 2021-04-12 PROCEDURE — 88309 TISSUE EXAM BY PATHOLOGIST: CPT | Mod: 26

## 2021-04-12 PROCEDURE — 55866 LAPS SURG PRST8ECT RPBIC RAD: CPT

## 2021-04-12 PROCEDURE — 38571 LAPAROSCOPY LYMPHADENECTOMY: CPT

## 2021-04-12 PROCEDURE — 38571 LAPAROSCOPY LYMPHADENECTOMY: CPT | Mod: AS

## 2021-04-12 RX ORDER — ACETAMINOPHEN 500 MG
650 TABLET ORAL EVERY 6 HOURS
Refills: 0 | Status: DISCONTINUED | OUTPATIENT
Start: 2021-04-12 | End: 2021-04-13

## 2021-04-12 RX ORDER — KETOROLAC TROMETHAMINE 30 MG/ML
15 SYRINGE (ML) INJECTION ONCE
Refills: 0 | Status: DISCONTINUED | OUTPATIENT
Start: 2021-04-12 | End: 2021-04-12

## 2021-04-12 RX ORDER — METOCLOPRAMIDE HCL 10 MG
10 TABLET ORAL EVERY 8 HOURS
Refills: 0 | Status: DISCONTINUED | OUTPATIENT
Start: 2021-04-12 | End: 2021-04-13

## 2021-04-12 RX ORDER — LIDOCAINE 4 G/100G
1 CREAM TOPICAL
Refills: 0 | Status: DISCONTINUED | OUTPATIENT
Start: 2021-04-12 | End: 2021-04-13

## 2021-04-12 RX ORDER — BUPIVACAINE 13.3 MG/ML
20 INJECTION, SUSPENSION, LIPOSOMAL INFILTRATION ONCE
Refills: 0 | Status: DISCONTINUED | OUTPATIENT
Start: 2021-04-12 | End: 2021-04-13

## 2021-04-12 RX ORDER — GABAPENTIN 400 MG/1
600 CAPSULE ORAL ONCE
Refills: 0 | Status: COMPLETED | OUTPATIENT
Start: 2021-04-12 | End: 2021-04-12

## 2021-04-12 RX ORDER — ONDANSETRON 8 MG/1
4 TABLET, FILM COATED ORAL EVERY 8 HOURS
Refills: 0 | Status: DISCONTINUED | OUTPATIENT
Start: 2021-04-12 | End: 2021-04-13

## 2021-04-12 RX ORDER — SODIUM CHLORIDE 9 MG/ML
1000 INJECTION, SOLUTION INTRAVENOUS
Refills: 0 | Status: DISCONTINUED | OUTPATIENT
Start: 2021-04-12 | End: 2021-04-13

## 2021-04-12 RX ORDER — ENOXAPARIN SODIUM 100 MG/ML
40 INJECTION SUBCUTANEOUS ONCE
Refills: 0 | Status: COMPLETED | OUTPATIENT
Start: 2021-04-12 | End: 2021-04-12

## 2021-04-12 RX ORDER — HYDROMORPHONE HYDROCHLORIDE 2 MG/ML
0.5 INJECTION INTRAMUSCULAR; INTRAVENOUS; SUBCUTANEOUS ONCE
Refills: 0 | Status: DISCONTINUED | OUTPATIENT
Start: 2021-04-12 | End: 2021-04-12

## 2021-04-12 RX ORDER — OXYCODONE AND ACETAMINOPHEN 5; 325 MG/1; MG/1
1 TABLET ORAL EVERY 4 HOURS
Refills: 0 | Status: DISCONTINUED | OUTPATIENT
Start: 2021-04-12 | End: 2021-04-13

## 2021-04-12 RX ORDER — SENNA PLUS 8.6 MG/1
2 TABLET ORAL EVERY 12 HOURS
Refills: 0 | Status: DISCONTINUED | OUTPATIENT
Start: 2021-04-12 | End: 2021-04-13

## 2021-04-12 RX ORDER — OXYBUTYNIN CHLORIDE 5 MG
5 TABLET ORAL EVERY 8 HOURS
Refills: 0 | Status: DISCONTINUED | OUTPATIENT
Start: 2021-04-12 | End: 2021-04-13

## 2021-04-12 RX ORDER — ACETAMINOPHEN 500 MG
1000 TABLET ORAL ONCE
Refills: 0 | Status: COMPLETED | OUTPATIENT
Start: 2021-04-12 | End: 2021-04-12

## 2021-04-12 RX ADMIN — OXYCODONE AND ACETAMINOPHEN 1 TABLET(S): 5; 325 TABLET ORAL at 18:08

## 2021-04-12 RX ADMIN — Medication 5 MILLIGRAM(S): at 22:21

## 2021-04-12 RX ADMIN — SODIUM CHLORIDE 125 MILLILITER(S): 9 INJECTION, SOLUTION INTRAVENOUS at 13:21

## 2021-04-12 RX ADMIN — Medication 5 MILLIGRAM(S): at 12:54

## 2021-04-12 RX ADMIN — Medication 15 MILLIGRAM(S): at 13:39

## 2021-04-12 RX ADMIN — Medication 650 MILLIGRAM(S): at 12:55

## 2021-04-12 RX ADMIN — Medication 1000 MILLIGRAM(S): at 06:41

## 2021-04-12 RX ADMIN — SENNA PLUS 2 TABLET(S): 8.6 TABLET ORAL at 17:34

## 2021-04-12 RX ADMIN — Medication 650 MILLIGRAM(S): at 13:39

## 2021-04-12 RX ADMIN — Medication 650 MILLIGRAM(S): at 23:17

## 2021-04-12 RX ADMIN — GABAPENTIN 600 MILLIGRAM(S): 400 CAPSULE ORAL at 06:41

## 2021-04-12 RX ADMIN — ENOXAPARIN SODIUM 40 MILLIGRAM(S): 100 INJECTION SUBCUTANEOUS at 06:42

## 2021-04-12 RX ADMIN — Medication 15 MILLIGRAM(S): at 13:19

## 2021-04-12 RX ADMIN — OXYCODONE AND ACETAMINOPHEN 1 TABLET(S): 5; 325 TABLET ORAL at 22:34

## 2021-04-12 RX ADMIN — OXYCODONE AND ACETAMINOPHEN 1 TABLET(S): 5; 325 TABLET ORAL at 21:34

## 2021-04-12 RX ADMIN — LIDOCAINE 1 APPLICATION(S): 4 CREAM TOPICAL at 12:57

## 2021-04-12 RX ADMIN — OXYCODONE AND ACETAMINOPHEN 1 TABLET(S): 5; 325 TABLET ORAL at 17:34

## 2021-04-12 NOTE — ASU PATIENT PROFILE, ADULT - PMH
Anxiety    Back pain    COVID-19    Dyslipidemia    Moderate obesity    Nephrolithiasis    Prostate cancer

## 2021-04-12 NOTE — PACU DISCHARGE NOTE - COMMENTS
pt met criteria for discharge- able to tolerate clear liquid diet- expresses sciatica Pain at baseline- [place] snot take Rx at home-urology notified and pending Cr results before prescribing a muscle relaxant- PRn Tylenol given- Wen patent  and draining blood tinge urine- endorsed to 8 [facility] nurse-pt left unit via bed on supplemental oxygen to 840.1

## 2021-04-12 NOTE — ASU PATIENT PROFILE, ADULT - URINARY CATHETER
24 MONTH WELL CHILD EXAM   [facility] [address]     24 MONTH WELL CHILD EXAM    Zayda is a 1  y.o. 1  m.o.female     History given by Mother    CONCERNS/QUESTIONS: No    IMMUNIZATION: up to date and documented      NUTRITION, ELIMINATION, SLEEP, SOCIAL      NUTRITION HISTORY:   Vegetables? Yes  Fruits? Yes  Meats? Yes  Juice?  Yes, 4 oz per day  Water? Yes  Milk? Yes, Type: no but eats dairy    MULTIVITAMIN: No    ELIMINATION:   Has ample wet diapers per day and BM is soft.     SLEEP PATTERN:   Sleeps through the night? Yes   Sleeps in bed? Yes  Sleeps with parent? No     SOCIAL HISTORY:   The patient lives at home with mother, father, brother(s), and does not attend day care. Has 1 siblings.  Is the child exposed to smoke? No    HISTORY   Patient's medications, allergies, past medical, surgical, social and family histories were reviewed and updated as appropriate.    Past Medical History:   Diagnosis Date   • Term birth of female       Patient Active Problem List    Diagnosis Date Noted   • Alternate vaccine schedule 2017     No past surgical history on file.  Family History   Problem Relation Age of Onset   • No Known Problems Mother    • No Known Problems Father      No current outpatient prescriptions on file.     No current facility-administered medications for this visit.      No Known Allergies    REVIEW OF SYSTEMS     Constitutional: Afebrile, good appetite, alert.  HENT: No abnormal head shape, no congestion, no nasal drainage.   Eyes: Negative for any discharge in eyes, appears to focus, no crossed eyes.   Respiratory: Negative for any difficulty breathing or noisy breathing.   Cardiovascular: Negative for changes in color/activity.   Gastrointestinal: Negative for any vomiting or excessive spitting up, constipation or blood in stool.  Genitourinary: Ample amount of wet diapers.   Musculoskeletal: Negative for any sign of arm pain or leg pain with movement.  "  Skin: Negative for rash or skin infection.  Neurological: Negative for any weakness or decrease in strength.     Psychiatric/Behavioral: Appropriate for age.     SCREENINGS     ASQ- Above cutoff in all domains: Yes   MCHAT: Pass  LEAD ASSESSMENT: done    SENSORY SCREENING:   Hearing: Risk Assessment Negative  Vision: Risk Assessment Negative    LEAD RISK ASSESSMENT:    Does your child live in or visit a home or  facility with an identified  lead hazard or a home built before 1960 that is in poor repair or was  renovated in the past 6 months? No    ORAL HEALTH:   Primary water source is deficient in fluoride? Yes  Oral Fluoride Supplementation recommended? Yes   Cleaning teeth twice a day, daily oral fluoride? Yes  Established dental home? Yes    SELECTIVE SCREENINGS INDICATED WITH SPECIFIC RISK CONDITIONS:   Blood pressure indicated: No  Dyslipidemia indicated Labs Indicated: No  (Family Hx, pt has diabetes, HTN, BMI >95%ile.    TB RISK ASSESMENT:   Has child been diagnosed with AIDS? No  Has family member had a positive TB test? No  Travel to high risk country? No      OBJECTIVE   PHYSICAL EXAM:   Reviewed vital signs and growth parameters in EMR.     Pulse 124   Temp 36.3 °C (97.3 °F)   Resp 28   Ht 0.889 m (2' 11\")   Wt 12 kg (26 lb 7.3 oz)   HC 47 cm (18.5\")   BMI 15.18 kg/m²     Height - 81 %ile (Z= 0.87) based on CDC 2-20 Years stature-for-age data using vitals from 1/10/2019.  Weight - 43 %ile (Z= -0.16) based on CDC 2-20 Years weight-for-age data using vitals from 1/10/2019.  BMI - 18 %ile (Z= -0.92) based on CDC 2-20 Years BMI-for-age data using vitals from 1/10/2019.    GENERAL: This is an alert, active child in no distress.   HEAD: Normocephalic, atraumatic.   EYES: PERRL, positive red reflex bilaterally. No conjunctival infection or discharge.   EARS: TM’s are transparent with good landmarks. Canals are patent.  NOSE: Nares are patent and free of congestion.  THROAT: Oropharynx has no " lesions, moist mucus membranes. Pharynx without erythema, tonsils normal.   NECK: Supple, no lymphadenopathy or masses.   HEART: Regular rate and rhythm without murmur. Pulses are 2+ and equal.   LUNGS: Clear bilaterally to auscultation, no wheezes or rhonchi. No retractions, nasal flaring, or distress noted.  ABDOMEN: Normal bowel sounds, soft and non-tender without hepatomegaly or splenomegaly or masses.   GENITALIA: Normal female genitalia. normal external genitalia, no erythema, no discharge.  MUSCULOSKELETAL: Spine is straight. Extremities are without abnormalities. Moves all extremities well and symmetrically with normal tone.    NEURO: Active, alert, oriented per age.    SKIN: Intact without significant rash or birthmarks. Skin is warm, dry, and pink.     ASSESSMENT AND PLAN     1. Well Child Exam:  Healthy1  y.o. 1  m.o. old with good growth and development.     1. Anticipatory guidance was reviewed and age appropriate Bright Futures handout provided.  2. Return to clinic for 2 year well child exam or as needed.  3. Immunizations given today: None.  4. Vaccine Information statements given for each vaccine if administered.  Discussed benefits and side effects of each vaccine with patient and family.  Answered all patient /family questions.  5. Multivitamin with 400iu of Vitamin D po qd.  6. See Dentist twice yearly.   no

## 2021-04-13 ENCOUNTER — TRANSCRIPTION ENCOUNTER (OUTPATIENT)
Age: 56
End: 2021-04-13

## 2021-04-13 VITALS
DIASTOLIC BLOOD PRESSURE: 64 MMHG | HEART RATE: 72 BPM | OXYGEN SATURATION: 97 % | RESPIRATION RATE: 16 BRPM | SYSTOLIC BLOOD PRESSURE: 102 MMHG | TEMPERATURE: 98 F

## 2021-04-13 DIAGNOSIS — C61 MALIGNANT NEOPLASM OF PROSTATE: ICD-10-CM

## 2021-04-13 LAB
ANION GAP SERPL CALC-SCNC: 7 MMOL/L — SIGNIFICANT CHANGE UP (ref 5–17)
BUN SERPL-MCNC: 15 MG/DL — SIGNIFICANT CHANGE UP (ref 7–23)
CALCIUM SERPL-MCNC: 8.8 MG/DL — SIGNIFICANT CHANGE UP (ref 8.4–10.5)
CHLORIDE SERPL-SCNC: 104 MMOL/L — SIGNIFICANT CHANGE UP (ref 96–108)
CO2 SERPL-SCNC: 28 MMOL/L — SIGNIFICANT CHANGE UP (ref 22–31)
CREAT SERPL-MCNC: 1.17 MG/DL — SIGNIFICANT CHANGE UP (ref 0.5–1.3)
GLUCOSE SERPL-MCNC: 111 MG/DL — HIGH (ref 70–99)
HCT VFR BLD CALC: 31.4 % — LOW (ref 39–50)
HGB BLD-MCNC: 9.2 G/DL — LOW (ref 13–17)
MCHC RBC-ENTMCNC: 19 PG — LOW (ref 27–34)
MCHC RBC-ENTMCNC: 29.3 GM/DL — LOW (ref 32–36)
MCV RBC AUTO: 64.9 FL — LOW (ref 80–100)
NRBC # BLD: 0 /100 WBCS — SIGNIFICANT CHANGE UP (ref 0–0)
PLATELET # BLD AUTO: 167 K/UL — SIGNIFICANT CHANGE UP (ref 150–400)
POTASSIUM SERPL-MCNC: 4.3 MMOL/L — SIGNIFICANT CHANGE UP (ref 3.5–5.3)
POTASSIUM SERPL-SCNC: 4.3 MMOL/L — SIGNIFICANT CHANGE UP (ref 3.5–5.3)
RBC # BLD: 4.84 M/UL — SIGNIFICANT CHANGE UP (ref 4.2–5.8)
RBC # FLD: 16 % — HIGH (ref 10.3–14.5)
SODIUM SERPL-SCNC: 139 MMOL/L — SIGNIFICANT CHANGE UP (ref 135–145)
WBC # BLD: 7.06 K/UL — SIGNIFICANT CHANGE UP (ref 3.8–10.5)
WBC # FLD AUTO: 7.06 K/UL — SIGNIFICANT CHANGE UP (ref 3.8–10.5)

## 2021-04-13 PROCEDURE — 86900 BLOOD TYPING SEROLOGIC ABO: CPT

## 2021-04-13 PROCEDURE — 80048 BASIC METABOLIC PNL TOTAL CA: CPT

## 2021-04-13 PROCEDURE — 36415 COLL VENOUS BLD VENIPUNCTURE: CPT

## 2021-04-13 PROCEDURE — 86901 BLOOD TYPING SEROLOGIC RH(D): CPT

## 2021-04-13 PROCEDURE — 88309 TISSUE EXAM BY PATHOLOGIST: CPT

## 2021-04-13 PROCEDURE — C1889: CPT

## 2021-04-13 PROCEDURE — S2900: CPT

## 2021-04-13 PROCEDURE — 88305 TISSUE EXAM BY PATHOLOGIST: CPT

## 2021-04-13 PROCEDURE — 38571 LAPAROSCOPY LYMPHADENECTOMY: CPT

## 2021-04-13 PROCEDURE — 85025 COMPLETE CBC W/AUTO DIFF WBC: CPT

## 2021-04-13 PROCEDURE — 55866 LAPS SURG PRST8ECT RPBIC RAD: CPT

## 2021-04-13 PROCEDURE — 85027 COMPLETE CBC AUTOMATED: CPT

## 2021-04-13 PROCEDURE — 86850 RBC ANTIBODY SCREEN: CPT

## 2021-04-13 RX ORDER — DOCUSATE SODIUM 100 MG
1 CAPSULE ORAL
Qty: 20 | Refills: 0
Start: 2021-04-13 | End: 2021-04-22

## 2021-04-13 RX ORDER — ACETAMINOPHEN WITH CODEINE 300MG-30MG
1 TABLET ORAL
Qty: 6 | Refills: 0
Start: 2021-04-13 | End: 2021-04-15

## 2021-04-13 RX ADMIN — OXYCODONE AND ACETAMINOPHEN 1 TABLET(S): 5; 325 TABLET ORAL at 06:22

## 2021-04-13 RX ADMIN — SENNA PLUS 2 TABLET(S): 8.6 TABLET ORAL at 06:22

## 2021-04-13 RX ADMIN — OXYCODONE AND ACETAMINOPHEN 1 TABLET(S): 5; 325 TABLET ORAL at 02:21

## 2021-04-13 RX ADMIN — HYDROMORPHONE HYDROCHLORIDE 0.5 MILLIGRAM(S): 2 INJECTION INTRAMUSCULAR; INTRAVENOUS; SUBCUTANEOUS at 00:20

## 2021-04-13 RX ADMIN — HYDROMORPHONE HYDROCHLORIDE 0.5 MILLIGRAM(S): 2 INJECTION INTRAMUSCULAR; INTRAVENOUS; SUBCUTANEOUS at 00:05

## 2021-04-13 RX ADMIN — OXYCODONE AND ACETAMINOPHEN 1 TABLET(S): 5; 325 TABLET ORAL at 07:22

## 2021-04-13 RX ADMIN — Medication 5 MILLIGRAM(S): at 06:30

## 2021-04-13 RX ADMIN — OXYCODONE AND ACETAMINOPHEN 1 TABLET(S): 5; 325 TABLET ORAL at 16:48

## 2021-04-13 RX ADMIN — OXYCODONE AND ACETAMINOPHEN 1 TABLET(S): 5; 325 TABLET ORAL at 03:21

## 2021-04-13 RX ADMIN — Medication 650 MILLIGRAM(S): at 00:17

## 2021-04-13 NOTE — DISCHARGE NOTE PROVIDER - NSCORESITESY/N_GEN_A_CORE_RD
Detail Level: Detailed
Introduction Text (Please End With A Colon): The following procedure was deferred:
Instructions (Optional): Monitor for now. If changes RTC for re evaluation.
No

## 2021-04-13 NOTE — DISCHARGE NOTE NURSING/CASE MANAGEMENT/SOCIAL WORK - PATIENT PORTAL LINK FT
You can access the FollowMyHealth Patient Portal offered by [facility] by registering at the following website: [email or web address]. By joining [employer]’s FollowMyHealth portal, you will also be able to view your health information using other applications (apps) compatible with our system.

## 2021-04-13 NOTE — PROGRESS NOTE ADULT - ASSESSMENT
Patient is a 55M w/ CaP s/p RALP.  Patient is VSS, HDS, and afebrile.    Plan:  -Dieet: CLD  -Pain control  -OOB/IS  -Monitor Urine output  -DVT ppx: SCD  -Possible discharge later this evening. 
56 yo male s/p RALP, POD #1

## 2021-04-13 NOTE — DISCHARGE NOTE PROVIDER - NSDCMRMEDTOKEN_GEN_ALL_CORE_FT
acetaminophen-codeine 300 mg-30 mg oral tablet: 1 tab(s) orally every 6 hours MDD:3  Augmentin 875 mg-125 mg oral tablet: 1 tab(s) orally every 12 hours   Colace 100 mg oral capsule: 1 cap(s) orally 2 times a day   ketorolac 10 mg oral tablet: 1 tab(s) orally prn, As Needed  Lexapro: 1  orally once a day 25 mg

## 2021-04-13 NOTE — DISCHARGE NOTE PROVIDER - NSDCCPCAREPLAN_GEN_ALL_CORE_FT
PRINCIPAL DISCHARGE DIAGNOSIS  Diagnosis: Prostate cancer  Assessment and Plan of Treatment: Prostate cancer      SECONDARY DISCHARGE DIAGNOSES  Diagnosis: Sciatica  Assessment and Plan of Treatment:     Diagnosis: Anxiety  Assessment and Plan of Treatment:

## 2021-04-13 NOTE — DISCHARGE NOTE PROVIDER - CARE PROVIDER_API CALL
Everardo Tovar)  Urology  [address] [address]  Phone: [phone]  Fax: [phone]  Follow Up Time:

## 2021-04-13 NOTE — PROGRESS NOTE ADULT - SUBJECTIVE AND OBJECTIVE BOX
INTERVAL HPI/OVERNIGHT EVENTS:  No acute events overnight.    VITALS:    T(F): 98.7 (04-13-21 @ 00:51), Max: 98.7 (04-13-21 @ 00:51)  HR: 59 (04-13-21 @ 05:21) (52 - 68)  BP: 106/64 (04-13-21 @ 05:21) (106/64 - 156/93)  RR: 16 (04-13-21 @ 05:21) (14 - 19)  SpO2: 96% (04-13-21 @ 05:21) (95% - 99%)  Wt(kg): --    I&O's Detail    12 Apr 2021 07:01  -  13 Apr 2021 05:53  --------------------------------------------------------  IN:    Lactated Ringers: 250 mL    Oral Fluid: 120 mL  Total IN: 370 mL    OUT:    Estimated Blood Loss (mL): 250 mL    Indwelling Catheter - Urethral (mL): 2325 mL  Total OUT: 2575 mL    Total NET: -2205 mL          MEDICATIONS:    ANTIBIOTICS:      PAIN CONTROL:  acetaminophen   Tablet .. 650 milliGRAM(s) Oral every 6 hours PRN  metoclopramide Injectable 10 milliGRAM(s) IV Push every 8 hours PRN  ondansetron Injectable 4 milliGRAM(s) IV Push every 8 hours PRN  oxycodone    5 mG/acetaminophen 325 mG 1 Tablet(s) Oral every 4 hours PRN       MEDS:  oxybutynin 5 milliGRAM(s) Oral every 8 hours PRN      HEME/ONC        PHYSICAL EXAM:  General: No acute distress.  Alert and Oriented  Abdominal Exam: soft, incision sites clean and dry   Exam: FC intact, urine slight heme      LABS:                        10.4   9.54  )-----------( 177      ( 12 Apr 2021 12:38 )             34.7     04-12    138  |  107  |  18  ----------------------------<  116<H>  4.9   |  23  |  1.31<H>    Ca    8.3<L>      12 Apr 2021 12:38            RADIOLOGY & ADDITIONAL TESTS:       
  UROLOGY POST OP NOTE (PAGER # [phone])    PROCEDURE: s/p RALP        Patient seen at bedside alert and oriented x 3. Patient denies n/v, chest pain, sob.     T(C): 36.9 (04-12-21 @ 14:11), Max: 36.9 (04-12-21 @ 14:11)  HR: 59 (04-12-21 @ 14:11) (52 - 68)  BP: 111/73 (04-12-21 @ 14:11) (108/62 - 156/93)  RR: 18 (04-12-21 @ 14:11) (14 - 19)  SpO2: 96% (04-12-21 @ 14:11) (96% - 99%)  Wt(kg): --    ON PE:    Abdomen: Appropriately soft nt/nd. Incision site c/d/i.    : Wen catheter in place draining yellow/clear.                           10.4   9.54  )-----------( 177      ( 12 Apr 2021 12:38 )             34.7     04-12    138  |  107  |  18  ----------------------------<  116<H>  4.9   |  23  |  1.31<H>    Ca    8.3<L>      12 Apr 2021 12:38

## 2021-04-13 NOTE — PROGRESS NOTE ADULT - PROBLEM SELECTOR PLAN 1
-stable  -OOB  -SCD's, IS  -f/u labs  -continue lopez  -d/c home with lopez to leg bag  -f/u as outpt

## 2021-04-13 NOTE — DISCHARGE NOTE PROVIDER - HOSPITAL COURSE
55 year old male with history of CaP, anxiety, depression, chronic pain, sciatica s/p RALP 4/12. Surgical course unremarkable. Hospitalization course unremarkable. Patient is afebrile, hemodynamically stable and optimized for discharge home with lopez catheter.

## 2021-04-13 NOTE — DISCHARGE NOTE PROVIDER - NSDCFUADDINST_GEN_ALL_CORE_FT
-start Augmentin 875-125mg BID x 3 days starting the day prior to the catheter removal  -use Tylenol PRN OTC as needed  -for severe pain not alleviated by OTC pain medications use acetaminophen-codeine #30 q6hrs PRN  -use Colace for constipation relief.     Wen Catheter Instructions:  - Please care for and empty urinary catheter bag as instructed by nurse.    General Discharge Instructions:  Use Tylenol for pain control as needed  Please resume all regular home medications unless specifically advised not to take a particular medication. Also, please take any new medications as prescribed.  Please get plenty of rest, continue to ambulate several times per day, and drink adequate amounts of fluids.     Warning Signs:  Please call your doctor if you experience the following:  *You experience new chest pain, pressure, squeezing or tightness.  *New or worsening cough, shortness of breath, or wheeze.  *If you are vomiting and cannot keep down fluids or your medications.  *You are getting dehydrated due to continued vomiting, diarrhea, or other reasons. Signs of dehydration include dry mouth, rapid heartbeat, or feeling dizzy or faint when standing.  *You see blood or dark/black material when you vomit or have a bowel movement.  *You experience burning when you urinate, have blood in your urine, or experience a discharge.  *Your pain is not improving within 8-12 hours or is not gone within 24 hours. Call or return immediately if your pain is getting worse, changes location, or moves to your chest or back.  *You have shaking chills, or fever greater than 100.4 degrees Fahrenheit.  *Any change in your symptoms, or any new symptoms that concern you.

## 2021-04-14 PROBLEM — C61 MALIGNANT NEOPLASM OF PROSTATE: Chronic | Status: ACTIVE | Noted: 2021-04-09

## 2021-04-14 PROBLEM — M54.9 DORSALGIA, UNSPECIFIED: Chronic | Status: ACTIVE | Noted: 2021-04-09

## 2021-04-14 PROBLEM — E78.5 HYPERLIPIDEMIA, UNSPECIFIED: Chronic | Status: ACTIVE | Noted: 2021-04-09

## 2021-04-14 PROBLEM — N20.0 CALCULUS OF KIDNEY: Chronic | Status: ACTIVE | Noted: 2021-04-09

## 2021-04-14 PROBLEM — F41.9 ANXIETY DISORDER, UNSPECIFIED: Chronic | Status: ACTIVE | Noted: 2021-04-09

## 2021-04-14 PROBLEM — E66.8 OTHER OBESITY: Chronic | Status: ACTIVE | Noted: 2021-04-09

## 2021-04-14 LAB — SURGICAL PATHOLOGY STUDY: SIGNIFICANT CHANGE UP

## 2021-04-20 ENCOUNTER — APPOINTMENT (OUTPATIENT)
Dept: UROLOGY | Facility: CLINIC | Age: 56
End: 2021-04-20
Payer: COMMERCIAL

## 2021-04-20 VITALS — TEMPERATURE: 98.6 F | SYSTOLIC BLOOD PRESSURE: 151 MMHG | DIASTOLIC BLOOD PRESSURE: 77 MMHG

## 2021-04-20 PROCEDURE — 99024 POSTOP FOLLOW-UP VISIT: CPT

## 2021-04-20 NOTE — ASSESSMENT
[FreeTextEntry1] : 55 year old male s/p single port radical prostatectomy\par -doing well today\par -catheter removed intact without difficulty\par -pathology reviewed\par -Kegel exercises discussed\par -start daily Cialis\par \par \par RTC in 6-8 weeks

## 2021-04-20 NOTE — HISTORY OF PRESENT ILLNESS
[FreeTextEntry1] : 55 year old male s/p single port radical prostatectomy\par Doing well today.  No fever, chills, nausea/vomiting. \par Passing flatus, moving bowels, tolerating diet\par \par No incisional complaints\par No leg swelling, chest pain, or SOB\par \par pathology=pT2 N0, GS 7 (3+4)\par \par

## 2021-04-24 ENCOUNTER — NON-APPOINTMENT (OUTPATIENT)
Age: 56
End: 2021-04-24

## 2021-05-24 ENCOUNTER — EMERGENCY (EMERGENCY)
Facility: HOSPITAL | Age: 56
LOS: 0 days | Discharge: HOME | End: 2021-05-24
Attending: EMERGENCY MEDICINE | Admitting: EMERGENCY MEDICINE
Payer: COMMERCIAL

## 2021-05-24 VITALS
HEART RATE: 65 BPM | RESPIRATION RATE: 18 BRPM | WEIGHT: 212.97 LBS | OXYGEN SATURATION: 98 % | SYSTOLIC BLOOD PRESSURE: 156 MMHG | DIASTOLIC BLOOD PRESSURE: 70 MMHG | HEIGHT: 69 IN | TEMPERATURE: 98 F

## 2021-05-24 DIAGNOSIS — Z87.442 PERSONAL HISTORY OF URINARY CALCULI: ICD-10-CM

## 2021-05-24 DIAGNOSIS — M54.5 LOW BACK PAIN: ICD-10-CM

## 2021-05-24 DIAGNOSIS — M53.3 SACROCOCCYGEAL DISORDERS, NOT ELSEWHERE CLASSIFIED: ICD-10-CM

## 2021-05-24 DIAGNOSIS — E78.5 HYPERLIPIDEMIA, UNSPECIFIED: ICD-10-CM

## 2021-05-24 DIAGNOSIS — Z85.46 PERSONAL HISTORY OF MALIGNANT NEOPLASM OF PROSTATE: ICD-10-CM

## 2021-05-24 PROCEDURE — 99284 EMERGENCY DEPT VISIT MOD MDM: CPT

## 2021-05-24 RX ORDER — METHOCARBAMOL 500 MG/1
750 TABLET, FILM COATED ORAL ONCE
Refills: 0 | Status: COMPLETED | OUTPATIENT
Start: 2021-05-24 | End: 2021-05-24

## 2021-05-24 RX ORDER — KETOROLAC TROMETHAMINE 30 MG/ML
30 SYRINGE (ML) INJECTION ONCE
Refills: 0 | Status: DISCONTINUED | OUTPATIENT
Start: 2021-05-24 | End: 2021-05-24

## 2021-05-24 RX ORDER — METHOCARBAMOL 500 MG/1
1 TABLET, FILM COATED ORAL
Qty: 20 | Refills: 0
Start: 2021-05-24 | End: 2021-05-28

## 2021-05-24 RX ADMIN — METHOCARBAMOL 750 MILLIGRAM(S): 500 TABLET, FILM COATED ORAL at 11:32

## 2021-05-24 RX ADMIN — Medication 30 MILLIGRAM(S): at 11:31

## 2021-05-24 NOTE — ED PROVIDER NOTE - PROGRESS NOTE DETAILS
BH: pain improved, rec cont motrin, add robaxin, steroid, tyl#3 prn, outpt f/u. The patient was given detailed return precautions and advised to return to the emergency department if any new symptoms developed, symptoms worsened or for any concerns. The patient was offered the opportunity to ask questions and verbalized that they understand the diagnosis and discharge instructions.

## 2021-05-24 NOTE — ED PROVIDER NOTE - OBJECTIVE STATEMENT
56 y/o male h/o HLD, nephrolithiasis, prostate CA, now p/w rt lower back and buttock pain x several weeks, worse x several days, constant, radiate to rt leg, sx are constant, worse with certain movements and position and walking, better with rest, denies fever, tactile temp, chills, paresthesias, focal weakness, bowel or bladder dysfunction, urinary sx, LE weakness, saddle anesthesia, or other associated complaints at present. Pt denies recent heavy lifting or trauma. Seen by his back specialist last week and started on tyl#3 and gabapentin with minimal relief.    Old chart reviewed.  I have reviewed and agree with the initial nursing note, except as documented in my note. 54 y/o male h/o HLD, nephrolithiasis, prostate CA, now p/w rt lower back and buttock pain x several weeks, worse x several days, constant, radiate to rt leg, sx are constant, worse with certain movements and position and walking, better with rest, denies fever, tactile temp, chills, paresthesias, focal weakness, bowel or bladder dysfunction, urinary sx, LE weakness, saddle anesthesia, or other associated complaints at present. Pt denies recent heavy lifting or trauma. Seen by his back specialist last week and started on motrin, tyl#3 and gabapentin with minimal relief.    Old chart reviewed.  I have reviewed and agree with the initial nursing note, except as documented in my note.

## 2021-05-24 NOTE — ED PROVIDER NOTE - PATIENT PORTAL LINK FT
You can access the FollowMyHealth Patient Portal offered by [facility] by registering at the following website: [email or web address]. By joining [employer]’s FollowMyHealth portal, you will also be able to view your health information using other applications (apps) compatible with our system.

## 2021-05-24 NOTE — ED PROVIDER NOTE - CLINICAL SUMMARY MEDICAL DECISION MAKING FREE TEXT BOX
FOLLOW UP WITH YOUR BACK SPECIALIST AS SCHEDULED.    Back Pain    Back pain is very common in adults. The cause of back pain is rarely dangerous and the pain often gets better over time. The cause of your back pain may not be known and may include strain of muscles or ligaments, degeneration of the spinal disks, or arthritis. Occasionally the pain may radiate down your leg(s). Over-the-counter medicines to reduce pain and inflammation are often the most helpful. Stretching and remaining active frequently helps the healing process.     SEEK IMMEDIATE MEDICAL CARE IF YOU HAVE ANY OF THE FOLLOWING SYMPTOMS: bowel or bladder control problems, unusual weakness or numbness in your arms or legs, nausea or vomiting, abdominal pain, fever, dizziness/lightheadedness. I have fully discussed the medical management and delivery of care with the patient / guardian. I have discussed any available labs, imaging and treatment options with the patient / guardian and any diagnostic results supporting the patient's diagnosis. Please see progress notes, attending note and above notations for further mdm. Chart completed.

## 2021-05-24 NOTE — ED PROVIDER NOTE - PHYSICAL EXAMINATION
VSS, awake, alert in NAD, chest CTAB, +S1/S2, RRR, abdomen soft, NT, no pulsatile masses or bruits appreciated, no midline spinal tenderness, step-offs or deformities, no erythema, swelling or ecchymosis, no skin rash or lesions, able to dorsiflex b/l great toe, motor and sensation intact b/l LE and equal, NV intact, antalgic gait.

## 2021-05-24 NOTE — ED PROVIDER NOTE - NSFOLLOWUPINSTRUCTIONS_ED_ALL_ED_FT
FOLLOW UP WITH YOUR BACK SPECIALIST AS SCHEDULED.    Back Pain    Back pain is very common in adults. The cause of back pain is rarely dangerous and the pain often gets better over time. The cause of your back pain may not be known and may include strain of muscles or ligaments, degeneration of the spinal disks, or arthritis. Occasionally the pain may radiate down your leg(s). Over-the-counter medicines to reduce pain and inflammation are often the most helpful. Stretching and remaining active frequently helps the healing process.     SEEK IMMEDIATE MEDICAL CARE IF YOU HAVE ANY OF THE FOLLOWING SYMPTOMS: bowel or bladder control problems, unusual weakness or numbness in your arms or legs, nausea or vomiting, abdominal pain, fever, dizziness/lightheadedness.

## 2021-06-11 LAB — PSA, POST - PROSTATECTOMY: <0.01 NG/ML

## 2021-06-14 ENCOUNTER — OUTPATIENT (OUTPATIENT)
Dept: OUTPATIENT SERVICES | Facility: HOSPITAL | Age: 56
LOS: 1 days | Discharge: HOME | End: 2021-06-14
Payer: COMMERCIAL

## 2021-06-14 DIAGNOSIS — M54.16 RADICULOPATHY, LUMBAR REGION: ICD-10-CM

## 2021-06-14 PROCEDURE — 72148 MRI LUMBAR SPINE W/O DYE: CPT | Mod: 26

## 2021-06-17 ENCOUNTER — APPOINTMENT (OUTPATIENT)
Dept: UROLOGY | Facility: CLINIC | Age: 56
End: 2021-06-17
Payer: COMMERCIAL

## 2021-06-17 VITALS
OXYGEN SATURATION: 98 % | DIASTOLIC BLOOD PRESSURE: 78 MMHG | HEART RATE: 77 BPM | SYSTOLIC BLOOD PRESSURE: 161 MMHG | TEMPERATURE: 98.3 F

## 2021-06-17 PROCEDURE — 99024 POSTOP FOLLOW-UP VISIT: CPT

## 2021-06-17 NOTE — HISTORY OF PRESENT ILLNESS
[FreeTextEntry1] : CC: s/p SP RALP \par \par Single port RALP\par Umbilical incision well healed\par GS7 margins negative\par PSA <0.01 ng/ml\par No evidence of disease\par He is getting erections; 7-8/10 with Cialis \par \par Still some TASH, but on exam, dry pad and no leak with 4x cough \par \par Follow up in 3 months with PSA prior to that visit\par \par

## 2021-06-26 DIAGNOSIS — Q76.2 CONGENITAL SPONDYLOLISTHESIS: ICD-10-CM

## 2021-06-29 ENCOUNTER — NON-APPOINTMENT (OUTPATIENT)
Age: 56
End: 2021-06-29

## 2021-06-29 ENCOUNTER — OUTPATIENT (OUTPATIENT)
Dept: OUTPATIENT SERVICES | Facility: HOSPITAL | Age: 56
LOS: 1 days | End: 2021-06-29
Payer: COMMERCIAL

## 2021-06-29 ENCOUNTER — APPOINTMENT (OUTPATIENT)
Dept: SPINE | Facility: CLINIC | Age: 56
End: 2021-06-29
Payer: COMMERCIAL

## 2021-06-29 ENCOUNTER — RESULT REVIEW (OUTPATIENT)
Age: 56
End: 2021-06-29

## 2021-06-29 VITALS
TEMPERATURE: 98.2 F | RESPIRATION RATE: 12 BRPM | DIASTOLIC BLOOD PRESSURE: 84 MMHG | OXYGEN SATURATION: 96 % | WEIGHT: 216 LBS | HEART RATE: 56 BPM | BODY MASS INDEX: 31.99 KG/M2 | HEIGHT: 69 IN | SYSTOLIC BLOOD PRESSURE: 158 MMHG

## 2021-06-29 PROCEDURE — 72114 X-RAY EXAM L-S SPINE BENDING: CPT | Mod: 26

## 2021-06-29 PROCEDURE — 72114 X-RAY EXAM L-S SPINE BENDING: CPT

## 2021-06-29 PROCEDURE — 99205 OFFICE O/P NEW HI 60 MIN: CPT

## 2021-06-29 NOTE — PHYSICAL EXAM
[Antalgic] : antalgic [UE/LE] : Sensory: Intact in bilateral upper & lower extremities [ALL] : Biceps, brachioradialis, triceps, patellar, ankle and plantar 2+ and symmetric bilaterally [de-identified] : Grade 2 spondylolisthesis at L4-5 with severe spinal stenosis at L4-5 and L5-S1 degenerative disc disease at L5-S1

## 2021-06-29 NOTE — DISCUSSION/SUMMARY
[Medication Risks Reviewed] : Medication risks reviewed [Surgical risks reviewed] : Surgical risks reviewed [de-identified] : I had a long discussion with the patient and his wife about the natural history of lumbar spondylolisthesis and lumbar stenosis neurogenic claudication.  We discussed the risk benefits and also turned of surgical intervention.  I recommend an L3-S2 posterior instrumented fusion with L4-5 and L5-S1 T LIF and a decompression from L4-S1 in the form of a laminectomy.  We have tentatively selected a date of July 26 and they are going to obtain all the necessary clearances.  Would like to get a preoperative EOS x-ray as well as a CT scan for surgical planning

## 2021-06-29 NOTE — REASON FOR VISIT
[Initial Visit] : an initial visit for [Herniated Discs] : herniated discs [Back Pain] : back pain [Sciatica] : sciatica

## 2021-06-29 NOTE — HISTORY OF PRESENT ILLNESS
[de-identified] : The patient is a 56-year-old man who is in back pain for several years it is worsened recently and he has had shooting pain going down his right leg.  He has tried physical therapy and epidural steroid injections as well as medications with no relief [Worsening] : worsening [___ yrs] : [unfilled] year(s) ago [Sitting] : sitting [Standing] : standing [Lifting] : lifting [Bending] : worsened by bending [Prolonged Standing] : worsened by prolonged standing [Walking] : worsened by walking [Exercise Regimen] : not relieved by exercise regimen [NSAIDs] : not relieved by nonsteroidal anti-inflammatory drugs [Physical Therapy] : not relieved by physical therapy [Recumbency] : relieved by recumbency [Rest] : relieved by rest

## 2021-07-07 ENCOUNTER — TRANSCRIPTION ENCOUNTER (OUTPATIENT)
Age: 56
End: 2021-07-07

## 2021-07-17 ENCOUNTER — OUTPATIENT (OUTPATIENT)
Dept: OUTPATIENT SERVICES | Facility: HOSPITAL | Age: 56
LOS: 1 days | Discharge: HOME | End: 2021-07-17
Payer: COMMERCIAL

## 2021-07-17 ENCOUNTER — RESULT REVIEW (OUTPATIENT)
Age: 56
End: 2021-07-17

## 2021-07-17 DIAGNOSIS — Q76.2 CONGENITAL SPONDYLOLISTHESIS: ICD-10-CM

## 2021-07-17 PROCEDURE — 72131 CT LUMBAR SPINE W/O DYE: CPT | Mod: 26

## 2021-07-25 ENCOUNTER — TRANSCRIPTION ENCOUNTER (OUTPATIENT)
Age: 56
End: 2021-07-25

## 2021-07-26 ENCOUNTER — APPOINTMENT (OUTPATIENT)
Dept: SPINE | Facility: HOSPITAL | Age: 56
End: 2021-07-26

## 2021-07-26 ENCOUNTER — INPATIENT (INPATIENT)
Facility: HOSPITAL | Age: 56
LOS: 5 days | Discharge: HOME | DRG: 454 | End: 2021-08-01
Attending: NEUROLOGICAL SURGERY | Admitting: NEUROLOGICAL SURGERY
Payer: COMMERCIAL

## 2021-07-26 VITALS
SYSTOLIC BLOOD PRESSURE: 147 MMHG | HEIGHT: 69 IN | HEART RATE: 55 BPM | WEIGHT: 216.93 LBS | RESPIRATION RATE: 16 BRPM | DIASTOLIC BLOOD PRESSURE: 81 MMHG | OXYGEN SATURATION: 95 % | TEMPERATURE: 99 F

## 2021-07-26 DIAGNOSIS — Z90.79 ACQUIRED ABSENCE OF OTHER GENITAL ORGAN(S): Chronic | ICD-10-CM

## 2021-07-26 LAB
ACANTHOCYTES BLD QL SMEAR: SLIGHT — SIGNIFICANT CHANGE UP
ANION GAP SERPL CALC-SCNC: 13 MMOL/L — SIGNIFICANT CHANGE UP (ref 5–17)
ANISOCYTOSIS BLD QL: SIGNIFICANT CHANGE UP
BASE EXCESS BLDA CALC-SCNC: -3 MMOL/L — LOW (ref -2–3)
BASO STIPL BLD QL SMEAR: PRESENT — SIGNIFICANT CHANGE UP
BASOPHILS # BLD AUTO: 0.12 K/UL — SIGNIFICANT CHANGE UP (ref 0–0.2)
BASOPHILS NFR BLD AUTO: 0.9 % — SIGNIFICANT CHANGE UP (ref 0–2)
BUN SERPL-MCNC: 19 MG/DL — SIGNIFICANT CHANGE UP (ref 7–23)
CA-I BLDA-SCNC: 1.24 MMOL/L — SIGNIFICANT CHANGE UP (ref 1.15–1.33)
CALCIUM SERPL-MCNC: 8.6 MG/DL — SIGNIFICANT CHANGE UP (ref 8.4–10.5)
CHLORIDE SERPL-SCNC: 105 MMOL/L — SIGNIFICANT CHANGE UP (ref 96–108)
CO2 BLDA-SCNC: 25 MMOL/L — HIGH (ref 19–24)
CO2 SERPL-SCNC: 18 MMOL/L — LOW (ref 22–31)
COHGB MFR BLDA: 1.4 % — SIGNIFICANT CHANGE UP
CREAT SERPL-MCNC: 1.27 MG/DL — SIGNIFICANT CHANGE UP (ref 0.5–1.3)
DACRYOCYTES BLD QL SMEAR: SLIGHT — SIGNIFICANT CHANGE UP
EOSINOPHIL # BLD AUTO: 0 K/UL — SIGNIFICANT CHANGE UP (ref 0–0.5)
EOSINOPHIL NFR BLD AUTO: 0 % — SIGNIFICANT CHANGE UP (ref 0–6)
GLUCOSE BLDC GLUCOMTR-MCNC: 107 MG/DL — HIGH (ref 70–99)
GLUCOSE SERPL-MCNC: 147 MG/DL — HIGH (ref 70–99)
HCO3 BLDA-SCNC: 23 MMOL/L — SIGNIFICANT CHANGE UP (ref 21–28)
HCT VFR BLD CALC: 37.5 % — LOW (ref 39–50)
HGB BLD-MCNC: 11.2 G/DL — LOW (ref 13–17)
HGB BLDA-MCNC: 11.4 G/DL — LOW (ref 12.6–17.4)
LYMPHOCYTES # BLD AUTO: 1.02 K/UL — SIGNIFICANT CHANGE UP (ref 1–3.3)
LYMPHOCYTES # BLD AUTO: 7.8 % — LOW (ref 13–44)
MANUAL SMEAR VERIFICATION: SIGNIFICANT CHANGE UP
MCHC RBC-ENTMCNC: 19.3 PG — LOW (ref 27–34)
MCHC RBC-ENTMCNC: 29.9 GM/DL — LOW (ref 32–36)
MCV RBC AUTO: 64.5 FL — LOW (ref 80–100)
METHGB MFR BLDA: 1 % — SIGNIFICANT CHANGE UP
MICROCYTES BLD QL: SIGNIFICANT CHANGE UP
MONOCYTES # BLD AUTO: 0.34 K/UL — SIGNIFICANT CHANGE UP (ref 0–0.9)
MONOCYTES NFR BLD AUTO: 2.6 % — SIGNIFICANT CHANGE UP (ref 2–14)
NEUTROPHILS # BLD AUTO: 11.64 K/UL — HIGH (ref 1.8–7.4)
NEUTROPHILS NFR BLD AUTO: 86.1 % — HIGH (ref 43–77)
NEUTS BAND # BLD: 2.6 % — SIGNIFICANT CHANGE UP (ref 0–8)
OVALOCYTES BLD QL SMEAR: SIGNIFICANT CHANGE UP
OXYHGB MFR BLDA: 97.6 % — HIGH (ref 90–95)
PCO2 BLDA: 45 MMHG — SIGNIFICANT CHANGE UP (ref 35–48)
PH BLDA: 7.32 — LOW (ref 7.35–7.45)
PLAT MORPH BLD: ABNORMAL
PLATELET # BLD AUTO: 172 K/UL — SIGNIFICANT CHANGE UP (ref 150–400)
PO2 BLDA: 135 MMHG — HIGH (ref 83–108)
POIKILOCYTOSIS BLD QL AUTO: SIGNIFICANT CHANGE UP
POLYCHROMASIA BLD QL SMEAR: SLIGHT — SIGNIFICANT CHANGE UP
POTASSIUM BLDA-SCNC: 4.4 MMOL/L — SIGNIFICANT CHANGE UP (ref 3.5–5.1)
POTASSIUM SERPL-MCNC: 4.4 MMOL/L — SIGNIFICANT CHANGE UP (ref 3.5–5.3)
POTASSIUM SERPL-SCNC: 4.4 MMOL/L — SIGNIFICANT CHANGE UP (ref 3.5–5.3)
RBC # BLD: 5.81 M/UL — HIGH (ref 4.2–5.8)
RBC # FLD: 15.9 % — HIGH (ref 10.3–14.5)
RBC BLD AUTO: ABNORMAL
SAO2 % BLDA: 100 % — HIGH (ref 94–98)
SODIUM BLDA-SCNC: 136 MMOL/L — SIGNIFICANT CHANGE UP (ref 136–145)
SODIUM SERPL-SCNC: 136 MMOL/L — SIGNIFICANT CHANGE UP (ref 135–145)
WBC # BLD: 13.12 K/UL — HIGH (ref 3.8–10.5)
WBC # FLD AUTO: 13.12 K/UL — HIGH (ref 3.8–10.5)

## 2021-07-26 PROCEDURE — 22853 INSJ BIOMECHANICAL DEVICE: CPT

## 2021-07-26 PROCEDURE — 22633 ARTHRD CMBN 1NTRSPC LUMBAR: CPT

## 2021-07-26 PROCEDURE — 20939 BONE MARROW ASPIR BONE GRFG: CPT

## 2021-07-26 PROCEDURE — 22848 INSERT PELV FIXATION DEVICE: CPT

## 2021-07-26 PROCEDURE — 99024 POSTOP FOLLOW-UP VISIT: CPT

## 2021-07-26 PROCEDURE — 22614 ARTHRD PST TQ 1NTRSPC EA ADD: CPT

## 2021-07-26 PROCEDURE — 63047 LAM FACETEC & FORAMOT LUMBAR: CPT | Mod: 59

## 2021-07-26 PROCEDURE — 63048 LAM FACETEC &FORAMOT EA ADDL: CPT | Mod: 59

## 2021-07-26 PROCEDURE — 22842 INSERT SPINE FIXATION DEVICE: CPT

## 2021-07-26 PROCEDURE — 22634 ARTHRD CMBN 1NTRSPC EA ADDL: CPT

## 2021-07-26 RX ORDER — ENOXAPARIN SODIUM 100 MG/ML
40 INJECTION SUBCUTANEOUS AT BEDTIME
Refills: 0 | Status: DISCONTINUED | OUTPATIENT
Start: 2021-07-27 | End: 2021-08-01

## 2021-07-26 RX ORDER — APREPITANT 80 MG/1
40 CAPSULE ORAL ONCE
Refills: 0 | Status: COMPLETED | OUTPATIENT
Start: 2021-07-26 | End: 2021-07-26

## 2021-07-26 RX ORDER — MAGNESIUM HYDROXIDE 400 MG/1
30 TABLET, CHEWABLE ORAL EVERY 12 HOURS
Refills: 0 | Status: DISCONTINUED | OUTPATIENT
Start: 2021-07-26 | End: 2021-07-28

## 2021-07-26 RX ORDER — BUPIVACAINE 13.3 MG/ML
20 INJECTION, SUSPENSION, LIPOSOMAL INFILTRATION ONCE
Refills: 0 | Status: DISCONTINUED | OUTPATIENT
Start: 2021-07-26 | End: 2021-08-01

## 2021-07-26 RX ORDER — ACETAMINOPHEN 500 MG
1000 TABLET ORAL ONCE
Refills: 0 | Status: COMPLETED | OUTPATIENT
Start: 2021-07-26 | End: 2021-07-26

## 2021-07-26 RX ORDER — CHLORHEXIDINE GLUCONATE 213 G/1000ML
1 SOLUTION TOPICAL ONCE
Refills: 0 | Status: COMPLETED | OUTPATIENT
Start: 2021-07-26 | End: 2021-07-26

## 2021-07-26 RX ORDER — ONDANSETRON 8 MG/1
4 TABLET, FILM COATED ORAL EVERY 6 HOURS
Refills: 0 | Status: DISCONTINUED | OUTPATIENT
Start: 2021-07-26 | End: 2021-08-01

## 2021-07-26 RX ORDER — SENNA PLUS 8.6 MG/1
2 TABLET ORAL AT BEDTIME
Refills: 0 | Status: DISCONTINUED | OUTPATIENT
Start: 2021-07-26 | End: 2021-08-01

## 2021-07-26 RX ORDER — ACETAMINOPHEN 500 MG
1000 TABLET ORAL EVERY 8 HOURS
Refills: 0 | Status: DISCONTINUED | OUTPATIENT
Start: 2021-07-26 | End: 2021-08-01

## 2021-07-26 RX ORDER — HYDROMORPHONE HYDROCHLORIDE 2 MG/ML
30 INJECTION INTRAMUSCULAR; INTRAVENOUS; SUBCUTANEOUS
Refills: 0 | Status: DISCONTINUED | OUTPATIENT
Start: 2021-07-26 | End: 2021-07-27

## 2021-07-26 RX ORDER — DEXTROSE MONOHYDRATE, SODIUM CHLORIDE, AND POTASSIUM CHLORIDE 50; .745; 4.5 G/1000ML; G/1000ML; G/1000ML
1000 INJECTION, SOLUTION INTRAVENOUS
Refills: 0 | Status: DISCONTINUED | OUTPATIENT
Start: 2021-07-26 | End: 2021-07-27

## 2021-07-26 RX ORDER — POVIDONE-IODINE 5 %
1 AEROSOL (ML) TOPICAL ONCE
Refills: 0 | Status: COMPLETED | OUTPATIENT
Start: 2021-07-26 | End: 2021-07-26

## 2021-07-26 RX ORDER — METHOCARBAMOL 500 MG/1
500 TABLET, FILM COATED ORAL EVERY 8 HOURS
Refills: 0 | Status: DISCONTINUED | OUTPATIENT
Start: 2021-07-26 | End: 2021-07-27

## 2021-07-26 RX ORDER — GABAPENTIN 400 MG/1
300 CAPSULE ORAL ONCE
Refills: 0 | Status: COMPLETED | OUTPATIENT
Start: 2021-07-26 | End: 2021-07-26

## 2021-07-26 RX ORDER — ESCITALOPRAM OXALATE 10 MG/1
20 TABLET, FILM COATED ORAL DAILY
Refills: 0 | Status: DISCONTINUED | OUTPATIENT
Start: 2021-07-26 | End: 2021-07-29

## 2021-07-26 RX ORDER — NALOXONE HYDROCHLORIDE 4 MG/.1ML
0.1 SPRAY NASAL
Refills: 0 | Status: DISCONTINUED | OUTPATIENT
Start: 2021-07-26 | End: 2021-08-01

## 2021-07-26 RX ORDER — CELECOXIB 200 MG/1
200 CAPSULE ORAL ONCE
Refills: 0 | Status: COMPLETED | OUTPATIENT
Start: 2021-07-26 | End: 2021-07-26

## 2021-07-26 RX ORDER — CEFAZOLIN SODIUM 1 G
2000 VIAL (EA) INJECTION EVERY 8 HOURS
Refills: 0 | Status: COMPLETED | OUTPATIENT
Start: 2021-07-26 | End: 2021-07-27

## 2021-07-26 RX ADMIN — Medication 1 APPLICATION(S): at 06:52

## 2021-07-26 RX ADMIN — CELECOXIB 200 MILLIGRAM(S): 200 CAPSULE ORAL at 06:51

## 2021-07-26 RX ADMIN — Medication 100 MILLIGRAM(S): at 23:11

## 2021-07-26 RX ADMIN — APREPITANT 40 MILLIGRAM(S): 80 CAPSULE ORAL at 06:51

## 2021-07-26 RX ADMIN — Medication 50 MILLIGRAM(S): at 23:10

## 2021-07-26 RX ADMIN — HYDROMORPHONE HYDROCHLORIDE 30 MILLILITER(S): 2 INJECTION INTRAMUSCULAR; INTRAVENOUS; SUBCUTANEOUS at 18:14

## 2021-07-26 RX ADMIN — CHLORHEXIDINE GLUCONATE 1 APPLICATION(S): 213 SOLUTION TOPICAL at 06:52

## 2021-07-26 RX ADMIN — SENNA PLUS 2 TABLET(S): 8.6 TABLET ORAL at 23:10

## 2021-07-26 RX ADMIN — Medication 1000 MILLIGRAM(S): at 23:10

## 2021-07-26 RX ADMIN — Medication 1000 MILLIGRAM(S): at 06:51

## 2021-07-26 RX ADMIN — GABAPENTIN 300 MILLIGRAM(S): 400 CAPSULE ORAL at 06:51

## 2021-07-26 RX ADMIN — ONDANSETRON 4 MILLIGRAM(S): 8 TABLET, FILM COATED ORAL at 23:20

## 2021-07-26 RX ADMIN — METHOCARBAMOL 500 MILLIGRAM(S): 500 TABLET, FILM COATED ORAL at 23:10

## 2021-07-26 NOTE — PRE-OP CHECKLIST - 2.
Legal name Jaxon Wheatley name use in [place] Legal name Dioni ( in IDs ) , Jaxon name use in [place]

## 2021-07-26 NOTE — H&P ADULT - NSHPPHYSICALEXAM_GEN_ALL_CORE
AAOx3, EOS, FC  EOMI, PERRL  PITTS 5/5, no drift  no clonus, no garcia  symmetric 2+ reflexes throughout

## 2021-07-26 NOTE — PROGRESS NOTE ADULT - SUBJECTIVE AND OBJECTIVE BOX
NEUROSURGERY POST OP NOTE:    POD# 0 S/P L4-S1 TLIF, L3-pelvis (S2AI) fusion w/ plastics closure    S: Surgery completed without complication. Postop patient c/o incisional pain. In process of being set up with PCA.      T(C): 37 (07-26-21 @ 06:29), Max: 37 (07-26-21 @ 06:29)  HR: 58 (07-26-21 @ 18:25) (55 - 65)  BP: 92/54 (07-26-21 @ 18:25) (89/55 - 147/81)  RR: 20 (07-26-21 @ 18:25) (14 - 24)  SpO2: 95% (07-26-21 @ 18:25) (94% - 99%)        acetaminophen   Tablet .. 1000 milliGRAM(s) Oral every 8 hours  BUpivacaine liposome 1.3% Injectable (no eMAR) 20 milliLiter(s) Local Injection once  ceFAZolin   IVPB 2000 milliGRAM(s) IV Intermittent every 8 hours  escitalopram 20 milliGRAM(s) Oral daily  HYDROmorphone PCA (1 mG/mL) 30 milliLiter(s) PCA Continuous PCA Continuous  magnesium hydroxide Suspension 30 milliLiter(s) Oral every 12 hours PRN  methocarbamol 500 milliGRAM(s) Oral every 8 hours  naloxone Injectable 0.1 milliGRAM(s) IV Push every 3 minutes PRN  ondansetron    Tablet 4 milliGRAM(s) Oral every 6 hours  pregabalin 50 milliGRAM(s) Oral three times a day  senna 2 Tablet(s) Oral at bedtime  sodium chloride 0.9% with potassium chloride 20 mEq/L 1000 milliLiter(s) IV Continuous <Continuous>      RADIOLOGY:     Exam:  General: patient seen laying supine in bed in NAD  Neuro: AAOx3, FC, OE spontaneously, speech clear and fluent,  face symmetric, strength UEs 5/5, b/l LEs HF 2/5 KF/KE 3/5 DF/PF 5/5 greatly pain limited, sensation intact to light touch throughout  HEENT: PERRL, EOMI  Neck: supple  Cardiac: RRR, S1S2  Pulmonary: chest rise symmetric  Abdomen: soft, nontender, nondistended  Ext: warm, perfusing well  Wound: back incision dressing c/d/i, HMVx1, JPx1          Assessment: 56yMale with chronic LBP with radiculopathy now s/p L4-S1 TLIF, L3-pelvis (S2AI) fusion w/ plastics closure (7/26).      Plan:  Neuro:  - neuro/vital checks  - pain control, PCA  - monitor HMV/EDGAR outputs, plastics closure  - standing xrays before d/c    Cardiac:  - normotensive BP goal    Pulmonary:  - on RA    GI:  - ADAT  - bowel regimen    Renal:  - IVF until tolerating PO diet    Heme:  - f/u postop Hb  - SQL POD1    Endo:  - no issues    ID:  - postop ancef    D/w Dr. Guillory    Assessment:  Present when checked    []  GCS  E   V  M     Heart Failure: []Acute, [] acute on chronic , []chronic  Heart Failure:  [] Diastolic (HFpEF), [] Systolic (HFrEF), []Combined (HFpEF and HFrEF), [] RHF, [] Pulm HTN, [] Other    [] YVETTE, [] ATN, [] AIN, [] other  [] CKD1, [] CKD2, [] CKD 3, [] CKD 4, [] CKD 5, []ESRD    Encephalopathy: [] Metabolic, [] Hepatic, [] toxic, [] Neurological, [] Other    Abnormal Nurtitional Status: [] malnurtition (see nutrition note), [ ]underweight: BMI < 19, [] morbid obesity: BMI >40, [] Cachexia    [] Sepsis  [] hypovolemic shock,[] cardiogenic shock, [] hemorrhagic shock, [] neuogenic shock  [] Acute Respiratory Failure  []Cerebral edema, [] Brain compression/ herniation,   [] Functional quadriplegia  [] Acute blood loss anemia

## 2021-07-26 NOTE — H&P ADULT - HISTORY OF PRESENT ILLNESS
The patient is a 56-year-old man who is in back pain for several years it is worsened recently and he has had shooting pain going down his right leg. He has tried physical therapy and epidural steroid injections as well as medications with no relief     He states the symptoms are worsening. The patient mentions the symptoms started 2 year(s) ago. His symptoms occur while walking, sitting, standing and lifting.       Modifying factors - worsened by bending, worsened by prolonged standing and worsened by walking. Relieving factors include relieved by recumbency and relieved by rest, but not relieved by exercise regimen, not relieved by nonsteroidal anti-inflammatory drugs and not relieved by physical therapy.

## 2021-07-26 NOTE — PROCEDURE NOTE - NSINDICATIONS_GEN_A_CORE
urology was consulted for a post-induction lopez placement due to prior placement difficulty by OR staff/interoperative placement by surgical services

## 2021-07-26 NOTE — H&P ADULT - NSICDXPASTMEDICALHX_GEN_ALL_CORE_FT
PAST MEDICAL HISTORY:  Anxiety     Back pain     COVID-19 march 2020    Dyslipidemia     Moderate obesity     Nephrolithiasis     Prostate cancer

## 2021-07-27 LAB
ANION GAP SERPL CALC-SCNC: 8 MMOL/L — SIGNIFICANT CHANGE UP (ref 5–17)
BUN SERPL-MCNC: 20 MG/DL — SIGNIFICANT CHANGE UP (ref 7–23)
BUN SERPL-MCNC: 22 MG/DL — SIGNIFICANT CHANGE UP (ref 7–23)
CALCIUM SERPL-MCNC: 8.1 MG/DL — LOW (ref 8.4–10.5)
CALCIUM SERPL-MCNC: 8.8 MG/DL — SIGNIFICANT CHANGE UP (ref 8.4–10.5)
CHLORIDE SERPL-SCNC: 108 MMOL/L — SIGNIFICANT CHANGE UP (ref 96–108)
CHLORIDE SERPL-SCNC: 110 MMOL/L — HIGH (ref 96–108)
CO2 SERPL-SCNC: 15 MMOL/L — LOW (ref 22–31)
CO2 SERPL-SCNC: 24 MMOL/L — SIGNIFICANT CHANGE UP (ref 22–31)
COVID-19 SPIKE DOMAIN AB INTERP: POSITIVE
COVID-19 SPIKE DOMAIN ANTIBODY RESULT: >250 U/ML — HIGH
CREAT SERPL-MCNC: 1.21 MG/DL — SIGNIFICANT CHANGE UP (ref 0.5–1.3)
CREAT SERPL-MCNC: 1.31 MG/DL — HIGH (ref 0.5–1.3)
GLUCOSE SERPL-MCNC: 109 MG/DL — HIGH (ref 70–99)
GLUCOSE SERPL-MCNC: 121 MG/DL — HIGH (ref 70–99)
HCT VFR BLD CALC: 33.6 % — LOW (ref 39–50)
HCT VFR BLD CALC: 33.8 % — LOW (ref 39–50)
HCV AB S/CO SERPL IA: 0.04 S/CO — SIGNIFICANT CHANGE UP
HCV AB SERPL-IMP: SIGNIFICANT CHANGE UP
HGB BLD-MCNC: 10.2 G/DL — LOW (ref 13–17)
HGB BLD-MCNC: 9.9 G/DL — LOW (ref 13–17)
MAGNESIUM SERPL-MCNC: 1.9 MG/DL — SIGNIFICANT CHANGE UP (ref 1.6–2.6)
MCHC RBC-ENTMCNC: 19.1 PG — LOW (ref 27–34)
MCHC RBC-ENTMCNC: 19.4 PG — LOW (ref 27–34)
MCHC RBC-ENTMCNC: 29.5 GM/DL — LOW (ref 32–36)
MCHC RBC-ENTMCNC: 30.2 GM/DL — LOW (ref 32–36)
MCV RBC AUTO: 64.1 FL — LOW (ref 80–100)
MCV RBC AUTO: 64.9 FL — LOW (ref 80–100)
NRBC # BLD: 0 /100 WBCS — SIGNIFICANT CHANGE UP (ref 0–0)
NRBC # BLD: 0 /100 WBCS — SIGNIFICANT CHANGE UP (ref 0–0)
PHOSPHATE SERPL-MCNC: 4.7 MG/DL — HIGH (ref 2.5–4.5)
PLATELET # BLD AUTO: 156 K/UL — SIGNIFICANT CHANGE UP (ref 150–400)
PLATELET # BLD AUTO: 169 K/UL — SIGNIFICANT CHANGE UP (ref 150–400)
POTASSIUM SERPL-MCNC: 4.8 MMOL/L — SIGNIFICANT CHANGE UP (ref 3.5–5.3)
POTASSIUM SERPL-MCNC: SIGNIFICANT CHANGE UP MMOL/L (ref 3.5–5.3)
POTASSIUM SERPL-SCNC: 4.8 MMOL/L — SIGNIFICANT CHANGE UP (ref 3.5–5.3)
POTASSIUM SERPL-SCNC: SIGNIFICANT CHANGE UP MMOL/L (ref 3.5–5.3)
RBC # BLD: 5.18 M/UL — SIGNIFICANT CHANGE UP (ref 4.2–5.8)
RBC # BLD: 5.27 M/UL — SIGNIFICANT CHANGE UP (ref 4.2–5.8)
RBC # FLD: 15.5 % — HIGH (ref 10.3–14.5)
RBC # FLD: 15.7 % — HIGH (ref 10.3–14.5)
SARS-COV-2 IGG+IGM SERPL QL IA: >250 U/ML — HIGH
SARS-COV-2 IGG+IGM SERPL QL IA: POSITIVE
SODIUM SERPL-SCNC: 133 MMOL/L — LOW (ref 135–145)
SODIUM SERPL-SCNC: 136 MMOL/L — SIGNIFICANT CHANGE UP (ref 135–145)
WBC # BLD: 8.65 K/UL — SIGNIFICANT CHANGE UP (ref 3.8–10.5)
WBC # BLD: 9.32 K/UL — SIGNIFICANT CHANGE UP (ref 3.8–10.5)
WBC # FLD AUTO: 8.65 K/UL — SIGNIFICANT CHANGE UP (ref 3.8–10.5)
WBC # FLD AUTO: 9.32 K/UL — SIGNIFICANT CHANGE UP (ref 3.8–10.5)

## 2021-07-27 PROCEDURE — 99024 POSTOP FOLLOW-UP VISIT: CPT

## 2021-07-27 PROCEDURE — 99223 1ST HOSP IP/OBS HIGH 75: CPT

## 2021-07-27 RX ORDER — SODIUM CHLORIDE 9 MG/ML
500 INJECTION INTRAMUSCULAR; INTRAVENOUS; SUBCUTANEOUS ONCE
Refills: 0 | Status: COMPLETED | OUTPATIENT
Start: 2021-07-27 | End: 2021-07-27

## 2021-07-27 RX ORDER — OXYCODONE HYDROCHLORIDE 5 MG/1
5 TABLET ORAL EVERY 4 HOURS
Refills: 0 | Status: DISCONTINUED | OUTPATIENT
Start: 2021-07-27 | End: 2021-08-01

## 2021-07-27 RX ORDER — OXYCODONE HYDROCHLORIDE 5 MG/1
10 TABLET ORAL EVERY 4 HOURS
Refills: 0 | Status: DISCONTINUED | OUTPATIENT
Start: 2021-07-27 | End: 2021-08-01

## 2021-07-27 RX ORDER — METHOCARBAMOL 500 MG/1
750 TABLET, FILM COATED ORAL EVERY 8 HOURS
Refills: 0 | Status: DISCONTINUED | OUTPATIENT
Start: 2021-07-27 | End: 2021-08-01

## 2021-07-27 RX ADMIN — DEXTROSE MONOHYDRATE, SODIUM CHLORIDE, AND POTASSIUM CHLORIDE 75 MILLILITER(S): 50; .745; 4.5 INJECTION, SOLUTION INTRAVENOUS at 02:31

## 2021-07-27 RX ADMIN — Medication 1000 MILLIGRAM(S): at 06:40

## 2021-07-27 RX ADMIN — METHOCARBAMOL 750 MILLIGRAM(S): 500 TABLET, FILM COATED ORAL at 23:16

## 2021-07-27 RX ADMIN — ONDANSETRON 4 MILLIGRAM(S): 8 TABLET, FILM COATED ORAL at 13:49

## 2021-07-27 RX ADMIN — Medication 1000 MILLIGRAM(S): at 23:10

## 2021-07-27 RX ADMIN — Medication 50 MILLIGRAM(S): at 06:39

## 2021-07-27 RX ADMIN — METHOCARBAMOL 500 MILLIGRAM(S): 500 TABLET, FILM COATED ORAL at 06:39

## 2021-07-27 RX ADMIN — ESCITALOPRAM OXALATE 20 MILLIGRAM(S): 10 TABLET, FILM COATED ORAL at 13:50

## 2021-07-27 RX ADMIN — Medication 1000 MILLIGRAM(S): at 14:45

## 2021-07-27 RX ADMIN — Medication 1000 MILLIGRAM(S): at 13:50

## 2021-07-27 RX ADMIN — Medication 50 MILLIGRAM(S): at 23:10

## 2021-07-27 RX ADMIN — Medication 100 MILLIGRAM(S): at 06:40

## 2021-07-27 RX ADMIN — ONDANSETRON 4 MILLIGRAM(S): 8 TABLET, FILM COATED ORAL at 06:39

## 2021-07-27 RX ADMIN — Medication 1000 MILLIGRAM(S): at 07:25

## 2021-07-27 RX ADMIN — SENNA PLUS 2 TABLET(S): 8.6 TABLET ORAL at 23:11

## 2021-07-27 RX ADMIN — ENOXAPARIN SODIUM 40 MILLIGRAM(S): 100 INJECTION SUBCUTANEOUS at 23:16

## 2021-07-27 RX ADMIN — ONDANSETRON 4 MILLIGRAM(S): 8 TABLET, FILM COATED ORAL at 23:11

## 2021-07-27 RX ADMIN — SODIUM CHLORIDE 2000 MILLILITER(S): 9 INJECTION INTRAMUSCULAR; INTRAVENOUS; SUBCUTANEOUS at 01:20

## 2021-07-27 RX ADMIN — OXYCODONE HYDROCHLORIDE 10 MILLIGRAM(S): 5 TABLET ORAL at 16:49

## 2021-07-27 RX ADMIN — Medication 1000 MILLIGRAM(S): at 00:10

## 2021-07-27 RX ADMIN — Medication 50 MILLIGRAM(S): at 13:49

## 2021-07-27 RX ADMIN — OXYCODONE HYDROCHLORIDE 10 MILLIGRAM(S): 5 TABLET ORAL at 23:11

## 2021-07-27 RX ADMIN — OXYCODONE HYDROCHLORIDE 10 MILLIGRAM(S): 5 TABLET ORAL at 17:21

## 2021-07-27 RX ADMIN — METHOCARBAMOL 750 MILLIGRAM(S): 500 TABLET, FILM COATED ORAL at 13:50

## 2021-07-27 NOTE — OCCUPATIONAL THERAPY INITIAL EVALUATION ADULT - DIAGNOSIS, OT EVAL
Pt tolerated session well demonstrating good activity tolerance and requiring CGA for LB dressing, transfers and toileting. Pt presents with increased pain, decreased standing balance and spinal precautions limited safety and independence with ADL's and Fx mobility.

## 2021-07-27 NOTE — CONSULT NOTE ADULT - SUBJECTIVE AND OBJECTIVE BOX
NEUROSURGERY PAIN MANAGEMENT NOTE    Chief Complaint: back pain radiating to RLE    HPI:  The patient is a 56-year-old man who is in back pain for several years it is worsened recently and he has had shooting pain going down his right leg. He has tried physical therapy and epidural steroid injections as well as medications with no relief     He states the symptoms are worsening. The patient mentions the symptoms started 2 year(s) ago. His symptoms occur while walking, sitting, standing and lifting.       Modifying factors - worsened by bending, worsened by prolonged standing and worsened by walking. Relieving factors include relieved by recumbency and relieved by rest, but not relieved by exercise regimen, not relieved by nonsteroidal anti-inflammatory drugs and not relieved by physical therapy.    (26 Jul 2021 06:36)      PAST MEDICAL & SURGICAL HISTORY:  Dyslipidemia    Prostate cancer    COVID-19  march 2020    Back pain    Anxiety    Nephrolithiasis    Moderate obesity    H/O prostatectomy  april 2021        FAMILY HISTORY:      SOCIAL HISTORY:  [ ] Denies Smoking, Alcohol, or Drug Use    HOME MEDICATIONS:   Please refer to initial HNP    PAIN HOME MEDICATIONS:    Allergies    No Known Allergies    Intolerances        PAIN MEDICATIONS:  acetaminophen   Tablet .. 1000 milliGRAM(s) Oral every 8 hours  escitalopram 20 milliGRAM(s) Oral daily  methocarbamol 750 milliGRAM(s) Oral every 8 hours  ondansetron    Tablet 4 milliGRAM(s) Oral every 6 hours  oxyCODONE    IR 5 milliGRAM(s) Oral every 4 hours PRN  oxyCODONE    IR 10 milliGRAM(s) Oral every 4 hours PRN  pregabalin 50 milliGRAM(s) Oral three times a day    Heme:  enoxaparin Injectable 40 milliGRAM(s) SubCutaneous at bedtime    Antibiotics:    Cardiovascular:    GI:  magnesium hydroxide Suspension 30 milliLiter(s) Oral every 12 hours PRN  senna 2 Tablet(s) Oral at bedtime    Endocrine:    All Other Medications:  BUpivacaine liposome 1.3% Injectable (no eMAR) 20 milliLiter(s) Local Injection once  naloxone Injectable 0.1 milliGRAM(s) IV Push every 3 minutes PRN  sodium chloride 0.9% with potassium chloride 20 mEq/L 1000 milliLiter(s) IV Continuous <Continuous>      Vital Signs Last 24 Hrs  T(C): 36.6 (27 Jul 2021 05:10), Max: 37 (26 Jul 2021 20:14)  T(F): 97.8 (27 Jul 2021 05:10), Max: 98.6 (26 Jul 2021 20:14)  HR: 59 (27 Jul 2021 05:10) (45 - 77)  BP: 102/64 (27 Jul 2021 05:10) (84/47 - 106/56)  BP(mean): 75 (26 Jul 2021 20:14) (65 - 78)  RR: 15 (27 Jul 2021 05:10) (10 - 24)  SpO2: 95% (27 Jul 2021 05:10) (93% - 99%)    LABS:                        9.9    9.32  )-----------( 169      ( 27 Jul 2021 12:22 )             33.6     07-27    133<L>  |  110<H>  |  20  ----------------------------<  109<H>  SEE NOTE   |  15<L>  |  1.21    Ca    8.1<L>      27 Jul 2021 07:21  Phos  4.7     07-27  Mg     1.9     07-27            RADIOLOGY:    Drug Screen:        REVIEW OF SYSTEMS:  CONSTITUTIONAL: No fever or fatigue O/N.   EYES: No eye pain, visual disturbances  ENMT:  No difficulty hearing. No throat pain  NECK: No pain or stiffness  RESPIRATORY: No cough, wheezing; No shortness of breath  CARDIOVASCULAR: No chest pain, palpitations.   GASTROINTESTINAL: Pt reports passing gas. No bowel movements. No abdominal or epigastric pain. No nausea, vomiting. GENITOURINARY: No dysuria, frequency, or incontinence  NEUROLOGICAL: No headaches, loss of strength, numbness, or tremors. No dizzinesss or lightheadedness with pain medications.   MUSCULOSKELETAL: Incisional back pain. No joint pain or swelling; No muscle, or extremity pain    PAIN ASSESSMENT: c/o back stiffness this morning    PHYSICAL EXAM  GENERAL: Laying in bed, NAD  Neuro: CN II-XII PERRRL, EOMI  Cranial nerves grossly intact  Motor exam: MAEx4 with good strength  Sensation intact to LT in UE/LE in 3 dermatomes  CHEST/LUNG: Clear to auscultation bilaterally; No rales, rhonchi, wheezing, or rubs  HEART: Regular rate and rhythm; No murmurs, rubs, or gallops  ABDOMEN: Soft, Nontender, Nondistended; Bowel sounds present  EXTREMITIES:  2+ Peripheral Pulses, No clubbing, cyanosis, or edema  SKIN: No rashes or lesions      ASSESSMENT:   56yMale with chronic LBP with radiculopathy now s/p L4-S1 TLIF, L3-pelvis (S2AI) fusion w/ plastics closure (7/26).    PLAN:   - Pain:     Tylenol 1000mg every 8 hours    Lyrica 50mg every 8 hours    Increase Robaxin to 750mg every 8 hours    Stop Dilaudid PCA    Start Oxycodone 5/10mg every 4 hours PRN mod/severe pain    - Bowel regimen: Senna    - Nausea ppx: Zofran standing  - Functional Goals: Pt will get OOB with PT today. Pt will resume previous level of activity without impairment from surgery.   - Additional Consults: None recommended.   - Additional Labs/Imaging:  None recommended.   - Follow up, Discharge Planning: pending PT/OT eval  - Pain Management follow up plan: will cont to follow    d/w Dr. Rloon  
Patient is a 56y old  Male who presents with a chief complaint of lumbar stenosis (27 Jul 2021 12:49)      HPI:  The patient is a 56-year-old man who is in back pain for several years it is worsened recently and he has had shooting pain going down his right leg. He has tried physical therapy and epidural steroid injections as well as medications with no relief     He states the symptoms are worsening. The patient mentions the symptoms started 2 year(s) ago. His symptoms occur while walking, sitting, standing and lifting.       Modifying factors - worsened by bending, worsened by prolonged standing and worsened by walking. Relieving factors include relieved by recumbency and relieved by rest, but not relieved by exercise regimen, not relieved by nonsteroidal anti-inflammatory drugs and not relieved by physical therapy.    (26 Jul 2021 06:36)    Subjective:  Pt has no acute complaints    Allergies    No Known Allergies    Intolerances    Home meds: Lexapro    MEDICATIONS  (STANDING):  acetaminophen   Tablet .. 1000 milliGRAM(s) Oral every 8 hours  BUpivacaine liposome 1.3% Injectable (no eMAR) 20 milliLiter(s) Local Injection once  enoxaparin Injectable 40 milliGRAM(s) SubCutaneous at bedtime  escitalopram 20 milliGRAM(s) Oral daily  methocarbamol 750 milliGRAM(s) Oral every 8 hours  ondansetron    Tablet 4 milliGRAM(s) Oral every 6 hours  pregabalin 50 milliGRAM(s) Oral three times a day  senna 2 Tablet(s) Oral at bedtime    MEDICATIONS  (PRN):  magnesium hydroxide Suspension 30 milliLiter(s) Oral every 12 hours PRN Constipation  naloxone Injectable 0.1 milliGRAM(s) IV Push every 3 minutes PRN For ANY of the following changes in patient status:  A. RR LESS THAN 10 breaths per minute, B. Oxygen saturation LESS THAN 90%, C. Sedation score of 6  oxyCODONE    IR 5 milliGRAM(s) Oral every 4 hours PRN Moderate Pain (4 - 6)  oxyCODONE    IR 10 milliGRAM(s) Oral every 4 hours PRN Severe Pain (7 - 10)      Drug Dosing Weight  Height (cm): 175.3 (26 Jul 2021 06:29)  Weight (kg): 98.4 (26 Jul 2021 06:29)  BMI (kg/m2): 32 (26 Jul 2021 06:29)  BSA (m2): 2.14 (26 Jul 2021 06:29)    PAST MEDICAL & SURGICAL HISTORY:  Dyslipidemia    Prostate cancer    COVID-19  march 2020    Back pain    Anxiety    Nephrolithiasis    Moderate obesity    H/O prostatectomy  april 2021        FAMILY HISTORY:  no cardiac disease    SOCIAL HISTORY:  no smoking  ADVANCE DIRECTIVES:    Vital Signs Last 24 Hrs  T(C): 36.7 (27 Jul 2021 08:49), Max: 37 (26 Jul 2021 20:14)  T(F): 98 (27 Jul 2021 08:49), Max: 98.6 (26 Jul 2021 20:14)  HR: 76 (27 Jul 2021 10:58) (45 - 77)  BP: 109/57 (27 Jul 2021 10:58) (84/47 - 111/61)  BP(mean): 75 (26 Jul 2021 20:14) (65 - 78)  ABP: 99/63 (26 Jul 2021 18:44) (99/63 - 120/71)  ABP(mean): 77 (26 Jul 2021 18:44) (77 - 87)  RR: 18 (27 Jul 2021 10:58) (10 - 24)  SpO2: 96% (27 Jul 2021 10:58) (93% - 99%)      ABG - ( 26 Jul 2021 08:41 )  pH, Arterial: 7.32  pH, Blood: x     /  pCO2: 45    /  pO2: 135   / HCO3: 23    / Base Excess: -3.0  /  SaO2: x                   I&O's Detail    26 Jul 2021 07:01  -  27 Jul 2021 07:00  --------------------------------------------------------  IN:    Oral Fluid: 560 mL    sodium chloride 0.9% w/ Additives: 525 mL  Total IN: 1085 mL    OUT:    Accordian (mL): 630 mL    Bulb (mL): 10 mL    Indwelling Catheter - Urethral (mL): 1160 mL  Total OUT: 1800 mL    Total NET: -715 mL      27 Jul 2021 07:01  -  27 Jul 2021 14:19  --------------------------------------------------------  IN:  Total IN: 0 mL    OUT:    Accordian (mL): 150 mL    Bulb (mL): 25 mL  Total OUT: 175 mL    Total NET: -175 mL          PHYSICAL EXAM:      Constitutional: NAD  Eyes: PERRLA  ENMT: MMM  Neck: supple  Back: midline  Respiratory: CTA b/l  Cardiovascular: rrr, s1s2, no m/r/g  Gastrointestinal: soft, NTND, + BS  Extremities: wwp  Vascular: + 2 pulses radial  Neurological: AAO x 4  Skin: no rash  Lymph Nodes: no LAD  Musculoskeletal: no joint swelling  Psychiatric: normal affect        LABS:  CBC Full  -  ( 27 Jul 2021 12:22 )  WBC Count : 9.32 K/uL  RBC Count : 5.18 M/uL  Hemoglobin : 9.9 g/dL  Hematocrit : 33.6 %  Platelet Count - Automated : 169 K/uL  Mean Cell Volume : 64.9 fl  Mean Cell Hemoglobin : 19.1 pg  Mean Cell Hemoglobin Concentration : 29.5 gm/dL  Auto Neutrophil # : x  Auto Lymphocyte # : x  Auto Monocyte # : x  Auto Eosinophil # : x  Auto Basophil # : x  Auto Neutrophil % : x  Auto Lymphocyte % : x  Auto Monocyte % : x  Auto Eosinophil % : x  Auto Basophil % : x    07-27    136  |  108  |  22  ----------------------------<  121<H>  4.8   |  24  |  1.31<H>    Ca    8.8      27 Jul 2021 12:22  Phos  4.7     07-27  Mg     1.9     07-27      CAPILLARY BLOOD GLUCOSE      POCT Blood Glucose.: 107 mg/dL (26 Jul 2021 08:49)          EKG:    ECHO, US:        RADIOLOGY:  < from: CT Lumbar Spine No Cont (07.17.21 @ 07:56) >  IMPRESSION:    CT lumbar spine for surgical planning.    1.  Multilevel degenerative changes, better appreciated on the MRI dated 6/14/2021 and notable for:    2.  L3-4 moderate foraminal narrowing.    3.  L4-5 grade I-II anterolisthesis with severe spinal stenosis and moderate/severe bilateral foraminal narrowing. Prominent facet arthropathy.    4.  L5-S1 severe bilateral foraminal narrowing.    5.  Schmorl's node along the L4 inferior endplate with surrounding degenerative changes.    < end of copied text >

## 2021-07-27 NOTE — PHYSICAL THERAPY INITIAL EVALUATION ADULT - DIAGNOSIS, PT EVAL
5A: Primary Prevention/Risk Reduction for Loss of Balance and Falling , 4F: Impaired Joint Mobility, Motor Function, Muscle Performance, and Range of Motion and Reflex Integrity Associated with Spinal Disorders

## 2021-07-27 NOTE — OCCUPATIONAL THERAPY INITIAL EVALUATION ADULT - LIVES WITH, PROFILE
Pt reports living with wife and son with 3 LEONC[address] however there is a ramp. At baseline Pt is independent with ADL's and does not require the use of any AE.

## 2021-07-27 NOTE — PHYSICAL THERAPY INITIAL EVALUATION ADULT - ACTIVE RANGE OF MOTION EXAMINATION, REHAB EVAL
within spinal precautions/bilateral upper extremity Active ROM was WFL (within functional limits)/bilateral  lower extremity Active ROM was WFL (within functional limits)

## 2021-07-27 NOTE — PHYSICAL THERAPY INITIAL EVALUATION ADULT - PERTINENT HX OF CURRENT PROBLEM, REHAB EVAL
56yMale with chronic LBP with radiculopathy now s/p L4-S1 TLIF, L3-pelvis (S2AI) fusion w/ plastics closure (7/26).

## 2021-07-27 NOTE — OCCUPATIONAL THERAPY INITIAL EVALUATION ADULT - PERTINENT HX OF CURRENT PROBLEM, REHAB EVAL
The patient is a 56-year-old man who is in back pain for several years it is worsened recently and he has had shooting pain going down his right leg. Now s/p  TLIF L4-S1, L3-pelvis fusion

## 2021-07-27 NOTE — PHYSICAL THERAPY INITIAL EVALUATION ADULT - ADDITIONAL COMMENTS
Pt lives in house with 3 [address], one level. Lives with wife and son. Pt limited to household distance <20ft without pain. Tried straight cane, reports unable to coordinate. Able to self dress, shower, needed help from wife cooking, shoes. Reports son will be able to assist as needed. Preivously radicular pain R>L to knees.

## 2021-07-27 NOTE — PHYSICAL THERAPY INITIAL EVALUATION ADULT - GENERAL OBSERVATIONS, REHAB EVAL
Spoke to RN Loree, pt cleared for PT. POD#1 TLIF L4-S1, L3-pelvis. Pt rcvd semi supine, +tele, +lopez, +IV, +Hemovac, +EDGAR, +heplock. Pt agreeable to PT, reports 5/10 pain. Tolerated session fairly well, demo Agapito bed mob, CG transfers, amb 125ft CG with RW.

## 2021-07-27 NOTE — PROGRESS NOTE ADULT - SUBJECTIVE AND OBJECTIVE BOX
HPI:  The patient is a 56-year-old man who is in back pain for several years it is worsened recently and he has had shooting pain going down his right leg. He has tried physical therapy and epidural steroid injections as well as medications with no relief     He states the symptoms are worsening. The patient mentions the symptoms started 2 year(s) ago. His symptoms occur while walking, sitting, standing and lifting.       Modifying factors - worsened by bending, worsened by prolonged standing and worsened by walking. Relieving factors include relieved by recumbency and relieved by rest, but not relieved by exercise regimen, not relieved by nonsteroidal anti-inflammatory drugs and not relieved by physical therapy.    (26 Jul 2021 06:36)    OVERNIGHT EVENTS: Nick and hypotensive overnight, Hgb stable. S/p 500 cc bolus. Pain controlled    Hospital Course:  7/26: POD0 L4-S1 TLIF, L3-pelvic fusion  7/27: POD1, Nick, hypotensive overnight s/p 500 cc bolus. STAT CBC o/n for high drain output in setting of low SBP and bradycardia, Hgb stable. neuro stable. F/u lopez removal. Standing xrays before discharge     Vital Signs Last 24 Hrs  T(C): 36.3 (27 Jul 2021 02:28), Max: 37 (26 Jul 2021 06:29)  T(F): 97.4 (27 Jul 2021 02:28), Max: 98.6 (26 Jul 2021 06:29)  HR: 57 (27 Jul 2021 02:28) (47 - 77)  BP: 92/60 (27 Jul 2021 02:28) (84/47 - 147/81)  BP(mean): 75 (26 Jul 2021 20:14) (65 - 78)  RR: 10 (27 Jul 2021 02:28) (10 - 24)  SpO2: 99% (27 Jul 2021 02:28) (93% - 99%)    I&O's Summary    26 Jul 2021 07:01  -  27 Jul 2021 03:01  --------------------------------------------------------  IN: 1085 mL / OUT: 1050 mL / NET: 35 mL        PHYSICAL EXAM:  General: patient seen laying supine in bed in NAD  Neuro: AAOx3, FC, OE spontaneously, speech clear and fluent,  face symmetric, strength UEs 5/5, b/l LEs HF/KF/KE 3/5 DF/PF 5/5 greatly pain limited, sensation intact to light touch throughout  HEENT: PERRL, EOMI  Neck: supple  Cardiac: RRR, S1S2  Pulmonary: chest rise symmetric  Abdomen: soft, nontender, nondistended  Ext: warm, perfusing well  Wound: back incision dressing c/d/i, HMVx1, JPx1    TUBES/LINES:  [] Lopez  [] Lumbar Drain  [x] Wound Drains  [] Others      DIET:  [] NPO  [x] Mechanical  [] Tube feeds    LABS:                        10.2   8.65  )-----------( 156      ( 27 Jul 2021 01:42 )             33.8     07-26    136  |  105  |  19  ----------------------------<  147<H>  4.4   |  18<L>  |  1.27    Ca    8.6      26 Jul 2021 18:55              CAPILLARY BLOOD GLUCOSE      POCT Blood Glucose.: 107 mg/dL (26 Jul 2021 08:49)      Drug Levels: [] N/A    CSF Analysis: [] N/A      Allergies    No Known Allergies    Intolerances      MEDICATIONS:  Antibiotics:  ceFAZolin   IVPB 2000 milliGRAM(s) IV Intermittent every 8 hours    Neuro:  acetaminophen   Tablet .. 1000 milliGRAM(s) Oral every 8 hours  escitalopram 20 milliGRAM(s) Oral daily  HYDROmorphone PCA (1 mG/mL) 30 milliLiter(s) PCA Continuous PCA Continuous  methocarbamol 500 milliGRAM(s) Oral every 8 hours  ondansetron    Tablet 4 milliGRAM(s) Oral every 6 hours  pregabalin 50 milliGRAM(s) Oral three times a day    Anticoagulation:  enoxaparin Injectable 40 milliGRAM(s) SubCutaneous at bedtime    OTHER:  BUpivacaine liposome 1.3% Injectable (no eMAR) 20 milliLiter(s) Local Injection once  magnesium hydroxide Suspension 30 milliLiter(s) Oral every 12 hours PRN  naloxone Injectable 0.1 milliGRAM(s) IV Push every 3 minutes PRN  senna 2 Tablet(s) Oral at bedtime    IVF:  sodium chloride 0.9% with potassium chloride 20 mEq/L 1000 milliLiter(s) IV Continuous <Continuous>    CULTURES:    RADIOLOGY & ADDITIONAL TESTS:      ASSESSMENT:  56yMale with chronic LBP with radiculopathy now s/p L4-S1 TLIF, L3-pelvis (S2AI) fusion w/ plastics closure (7/26).      M54.16    M54.16/M47.817/[id number]    Handoff    MEWS Score    Dyslipidemia    Prostate cancer    COVID-19    Back pain    Anxiety    Nephrolithiasis    Moderate obesity    Spondylolisthesis at L4-L5 level    Spondylolisthesis at L4-L5 level    Insertion of Lopez catheter    Lumbar spinal fusion    No significant past surgical history    H/O prostatectomy    SysAdmin_VstLnk        Plan:  Neuro:  - neuro/vital [id number]  - pain control, PCA  - monitor HMV/EDGAR outputs, plastics closure  - standing xrays before d/c    Cardiac:  - normotensive BP goal    Pulmonary:  - on RA    GI:  - ADAT  - bowel regimen    Renal:  - IVF until tolerating PO diet    Heme:  - Trend H/H  - SCDs    Endo:  - no issues    ID:  - postop ancef  - afebrile monitor for leukocytosis    D/w Dr. Guillory    Assessment:  Present when checked    []  GCS  E   V  M     Heart Failure: []Acute, [] acute on chronic , []chronic  Heart Failure:  [] Diastolic (HFpEF), [] Systolic (HFrEF), []Combined (HFpEF and HFrEF), [] RHF, [] Pulm HTN, [] Other    [] YVETTE, [] ATN, [] AIN, [] other  [] CKD1, [] CKD2, [] CKD 3, [] CKD 4, [] CKD 5, []ESRD    Encephalopathy: [] Metabolic, [] Hepatic, [] toxic, [] Neurological, [] Other    Abnormal Nurtitional Status: [] malnurtition (see nutrition note), [ ]underweight: BMI < 19, [] morbid obesity: BMI >40, [] Cachexia    [] Sepsis  [] hypovolemic shock,[] cardiogenic shock, [] hemorrhagic shock, [] neuogenic shock  [] Acute Respiratory Failure  []Cerebral edema, [] Brain compression/ herniation,   [] Functional quadriplegia  [] Acute blood loss anemia   HPI:  The patient is a 56-year-old man who is in back pain for several years it is worsened recently and he has had shooting pain going down his right leg. He has tried physical therapy and epidural steroid injections as well as medications with no relief     He states the symptoms are worsening. The patient mentions the symptoms started 2 year(s) ago. His symptoms occur while walking, sitting, standing and lifting.       Modifying factors - worsened by bending, worsened by prolonged standing and worsened by walking. Relieving factors include relieved by recumbency and relieved by rest, but not relieved by exercise regimen, not relieved by nonsteroidal anti-inflammatory drugs and not relieved by physical therapy.    (26 Jul 2021 06:36)    OVERNIGHT EVENTS: Nick and hypotensive overnight, Hgb stable. S/p 500 cc bolus. Pain controlled    Hospital Course:  7/26: POD0 L4-S1 TLIF, L3-pelvic fusion  7/27: POD1, Nick, hypotensive overnight s/p 500 cc bolus. STAT CBC o/n for high drain output in setting of low SBP and bradycardia, Hgb stable. neuro stable. F/u lopez removal. Standing xrays before discharge     Vital Signs Last 24 Hrs  T(C): 36.3 (27 Jul 2021 02:28), Max: 37 (26 Jul 2021 06:29)  T(F): 97.4 (27 Jul 2021 02:28), Max: 98.6 (26 Jul 2021 06:29)  HR: 57 (27 Jul 2021 02:28) (47 - 77)  BP: 92/60 (27 Jul 2021 02:28) (84/47 - 147/81)  BP(mean): 75 (26 Jul 2021 20:14) (65 - 78)  RR: 10 (27 Jul 2021 02:28) (10 - 24)  SpO2: 99% (27 Jul 2021 02:28) (93% - 99%)    I&O's Summary    26 Jul 2021 07:01  -  27 Jul 2021 03:01  --------------------------------------------------------  IN: 1085 mL / OUT: 1050 mL / NET: 35 mL        PHYSICAL EXAM:  General: patient seen laying supine in bed in NAD  Neuro: AAOx3, FC, OE spontaneously, speech clear and fluent,  face symmetric, strength UEs 5/5, b/l LEs HF/KF/KE 3/5 DF/PF 5/5 greatly pain limited, sensation intact to light touch throughout  HEENT: PERRL, EOMI  Neck: supple  Cardiac: RRR, S1S2  Pulmonary: chest rise symmetric  Abdomen: soft, nontender, nondistended  Ext: warm, perfusing well  Wound: back incision dressing c/d/i, HMVx1, JPx1    TUBES/LINES:  [] Lopez  [] Lumbar Drain  [x] Wound Drains  [] Others      DIET:  [] NPO  [x] Mechanical  [] Tube feeds    LABS:                        10.2   8.65  )-----------( 156      ( 27 Jul 2021 01:42 )             33.8     07-26    136  |  105  |  19  ----------------------------<  147<H>  4.4   |  18<L>  |  1.27    Ca    8.6      26 Jul 2021 18:55              CAPILLARY BLOOD GLUCOSE      POCT Blood Glucose.: 107 mg/dL (26 Jul 2021 08:49)      Drug Levels: [] N/A    CSF Analysis: [] N/A      Allergies    No Known Allergies    Intolerances      MEDICATIONS:  Antibiotics:  ceFAZolin   IVPB 2000 milliGRAM(s) IV Intermittent every 8 hours    Neuro:  acetaminophen   Tablet .. 1000 milliGRAM(s) Oral every 8 hours  escitalopram 20 milliGRAM(s) Oral daily  HYDROmorphone PCA (1 mG/mL) 30 milliLiter(s) PCA Continuous PCA Continuous  methocarbamol 500 milliGRAM(s) Oral every 8 hours  ondansetron    Tablet 4 milliGRAM(s) Oral every 6 hours  pregabalin 50 milliGRAM(s) Oral three times a day    Anticoagulation:  enoxaparin Injectable 40 milliGRAM(s) SubCutaneous at bedtime    OTHER:  BUpivacaine liposome 1.3% Injectable (no eMAR) 20 milliLiter(s) Local Injection once  magnesium hydroxide Suspension 30 milliLiter(s) Oral every 12 hours PRN  naloxone Injectable 0.1 milliGRAM(s) IV Push every 3 minutes PRN  senna 2 Tablet(s) Oral at bedtime    IVF:  sodium chloride 0.9% with potassium chloride 20 mEq/L 1000 milliLiter(s) IV Continuous <Continuous>    CULTURES:    RADIOLOGY & ADDITIONAL TESTS:      ASSESSMENT:  56yMale with chronic LBP with radiculopathy now s/p L4-S1 TLIF, L3-pelvis (S2AI) fusion w/ plastics closure (7/26).      M54.16    M54.16/M47.817/[id number]    Handoff    MEWS Score    Dyslipidemia    Prostate cancer    COVID-19    Back pain    Anxiety    Nephrolithiasis    Moderate obesity    Spondylolisthesis at L4-L5 level    Spondylolisthesis at L4-L5 level    Insertion of Lopez catheter    Lumbar spinal fusion    No significant past surgical history    H/O prostatectomy    SysAdmin_VstLnk        Plan:  Neuro:  - neuro/vital [id number]  - pain control, PCA  - monitor HMV/EDGAR outputs, plastics closure  - standing xrays before d/c    Cardiac:  - normotensive BP goal  - s/p hypotension and bradycardia treated with 500 cc NS bolus and pressure and HR stabilized.    Pulmonary:  - on RA    GI:  - ADAT  - bowel regimen    Renal:  - IVF until tolerating PO diet    Heme:  - Trend H/H  - SCDs    Endo:  - no issues    ID:  - postop ancef  - afebrile monitor for leukocytosis    D/w Dr. Guillory    Assessment:  Present when checked    []  GCS  E   V  M     Heart Failure: []Acute, [] acute on chronic , []chronic  Heart Failure:  [] Diastolic (HFpEF), [] Systolic (HFrEF), []Combined (HFpEF and HFrEF), [] RHF, [] Pulm HTN, [] Other    [] YVETTE, [] ATN, [] AIN, [] other  [] CKD1, [] CKD2, [] CKD 3, [] CKD 4, [] CKD 5, []ESRD    Encephalopathy: [] Metabolic, [] Hepatic, [] toxic, [] Neurological, [] Other    Abnormal Nurtitional Status: [] malnurtition (see nutrition note), [ ]underweight: BMI < 19, [] morbid obesity: BMI >40, [] Cachexia    [] Sepsis  [] hypovolemic shock,[] cardiogenic shock, [] hemorrhagic shock, [] neuogenic shock  [] Acute Respiratory Failure  []Cerebral edema, [] Brain compression/ herniation,   [] Functional quadriplegia  [] Acute blood loss anemia

## 2021-07-28 DIAGNOSIS — D69.6 THROMBOCYTOPENIA, UNSPECIFIED: ICD-10-CM

## 2021-07-28 DIAGNOSIS — M54.9 DORSALGIA, UNSPECIFIED: ICD-10-CM

## 2021-07-28 DIAGNOSIS — D62 ACUTE POSTHEMORRHAGIC ANEMIA: ICD-10-CM

## 2021-07-28 LAB
ANION GAP SERPL CALC-SCNC: 8 MMOL/L — SIGNIFICANT CHANGE UP (ref 5–17)
BLD GP AB SCN SERPL QL: NEGATIVE — SIGNIFICANT CHANGE UP
BUN SERPL-MCNC: 20 MG/DL — SIGNIFICANT CHANGE UP (ref 7–23)
CALCIUM SERPL-MCNC: 8.5 MG/DL — SIGNIFICANT CHANGE UP (ref 8.4–10.5)
CHLORIDE SERPL-SCNC: 107 MMOL/L — SIGNIFICANT CHANGE UP (ref 96–108)
CO2 SERPL-SCNC: 26 MMOL/L — SIGNIFICANT CHANGE UP (ref 22–31)
CREAT SERPL-MCNC: 1.29 MG/DL — SIGNIFICANT CHANGE UP (ref 0.5–1.3)
GLUCOSE SERPL-MCNC: 122 MG/DL — HIGH (ref 70–99)
HCT VFR BLD CALC: 27.8 % — LOW (ref 39–50)
HCT VFR BLD CALC: 30.8 % — LOW (ref 39–50)
HGB BLD-MCNC: 8.4 G/DL — LOW (ref 13–17)
HGB BLD-MCNC: 9.3 G/DL — LOW (ref 13–17)
MAGNESIUM SERPL-MCNC: 1.8 MG/DL — SIGNIFICANT CHANGE UP (ref 1.6–2.6)
MCHC RBC-ENTMCNC: 19.4 PG — LOW (ref 27–34)
MCHC RBC-ENTMCNC: 19.6 PG — LOW (ref 27–34)
MCHC RBC-ENTMCNC: 30.2 GM/DL — LOW (ref 32–36)
MCHC RBC-ENTMCNC: 30.2 GM/DL — LOW (ref 32–36)
MCV RBC AUTO: 64.2 FL — LOW (ref 80–100)
MCV RBC AUTO: 64.8 FL — LOW (ref 80–100)
NRBC # BLD: 0 /100 WBCS — SIGNIFICANT CHANGE UP (ref 0–0)
NRBC # BLD: 0 /100 WBCS — SIGNIFICANT CHANGE UP (ref 0–0)
PHOSPHATE SERPL-MCNC: 2.3 MG/DL — LOW (ref 2.5–4.5)
PLATELET # BLD AUTO: 114 K/UL — LOW (ref 150–400)
PLATELET # BLD AUTO: 129 K/UL — LOW (ref 150–400)
POTASSIUM SERPL-MCNC: 4.1 MMOL/L — SIGNIFICANT CHANGE UP (ref 3.5–5.3)
POTASSIUM SERPL-SCNC: 4.1 MMOL/L — SIGNIFICANT CHANGE UP (ref 3.5–5.3)
RBC # BLD: 4.29 M/UL — SIGNIFICANT CHANGE UP (ref 4.2–5.8)
RBC # BLD: 4.8 M/UL — SIGNIFICANT CHANGE UP (ref 4.2–5.8)
RBC # FLD: 15.7 % — HIGH (ref 10.3–14.5)
RBC # FLD: 15.8 % — HIGH (ref 10.3–14.5)
RH IG SCN BLD-IMP: POSITIVE — SIGNIFICANT CHANGE UP
SODIUM SERPL-SCNC: 141 MMOL/L — SIGNIFICANT CHANGE UP (ref 135–145)
WBC # BLD: 6.89 K/UL — SIGNIFICANT CHANGE UP (ref 3.8–10.5)
WBC # BLD: 7.49 K/UL — SIGNIFICANT CHANGE UP (ref 3.8–10.5)
WBC # FLD AUTO: 6.89 K/UL — SIGNIFICANT CHANGE UP (ref 3.8–10.5)
WBC # FLD AUTO: 7.49 K/UL — SIGNIFICANT CHANGE UP (ref 3.8–10.5)

## 2021-07-28 PROCEDURE — 99233 SBSQ HOSP IP/OBS HIGH 50: CPT | Mod: GC

## 2021-07-28 RX ORDER — SODIUM CHLORIDE 9 MG/ML
1000 INJECTION INTRAMUSCULAR; INTRAVENOUS; SUBCUTANEOUS ONCE
Refills: 0 | Status: COMPLETED | OUTPATIENT
Start: 2021-07-28 | End: 2021-07-28

## 2021-07-28 RX ORDER — SODIUM CHLORIDE 9 MG/ML
500 INJECTION INTRAMUSCULAR; INTRAVENOUS; SUBCUTANEOUS ONCE
Refills: 0 | Status: DISCONTINUED | OUTPATIENT
Start: 2021-07-28 | End: 2021-07-28

## 2021-07-28 RX ORDER — SOD SULF/SODIUM/NAHCO3/KCL/PEG
4000 SOLUTION, RECONSTITUTED, ORAL ORAL ONCE
Refills: 0 | Status: DISCONTINUED | OUTPATIENT
Start: 2021-07-28 | End: 2021-07-28

## 2021-07-28 RX ORDER — KETOROLAC TROMETHAMINE 30 MG/ML
30 SYRINGE (ML) INJECTION EVERY 8 HOURS
Refills: 0 | Status: DISCONTINUED | OUTPATIENT
Start: 2021-07-28 | End: 2021-07-31

## 2021-07-28 RX ORDER — SODIUM CHLORIDE 9 MG/ML
500 INJECTION INTRAMUSCULAR; INTRAVENOUS; SUBCUTANEOUS ONCE
Refills: 0 | Status: COMPLETED | OUTPATIENT
Start: 2021-07-28 | End: 2021-07-28

## 2021-07-28 RX ORDER — POLYETHYLENE GLYCOL 3350 17 G/17G
17 POWDER, FOR SOLUTION ORAL DAILY
Refills: 0 | Status: DISCONTINUED | OUTPATIENT
Start: 2021-07-28 | End: 2021-08-01

## 2021-07-28 RX ORDER — SODIUM,POTASSIUM PHOSPHATES 278-250MG
1 POWDER IN PACKET (EA) ORAL
Refills: 0 | Status: COMPLETED | OUTPATIENT
Start: 2021-07-28 | End: 2021-07-28

## 2021-07-28 RX ORDER — MAGNESIUM SULFATE 500 MG/ML
2 VIAL (ML) INJECTION ONCE
Refills: 0 | Status: COMPLETED | OUTPATIENT
Start: 2021-07-28 | End: 2021-07-28

## 2021-07-28 RX ORDER — MAGNESIUM OXIDE 400 MG ORAL TABLET 241.3 MG
400 TABLET ORAL ONCE
Refills: 0 | Status: COMPLETED | OUTPATIENT
Start: 2021-07-28 | End: 2021-07-28

## 2021-07-28 RX ADMIN — Medication 1000 MILLIGRAM(S): at 22:15

## 2021-07-28 RX ADMIN — Medication 1000 MILLIGRAM(S): at 08:04

## 2021-07-28 RX ADMIN — SENNA PLUS 2 TABLET(S): 8.6 TABLET ORAL at 21:40

## 2021-07-28 RX ADMIN — Medication 1000 MILLIGRAM(S): at 00:00

## 2021-07-28 RX ADMIN — ONDANSETRON 4 MILLIGRAM(S): 8 TABLET, FILM COATED ORAL at 18:23

## 2021-07-28 RX ADMIN — ONDANSETRON 4 MILLIGRAM(S): 8 TABLET, FILM COATED ORAL at 11:43

## 2021-07-28 RX ADMIN — Medication 50 MILLIGRAM(S): at 14:29

## 2021-07-28 RX ADMIN — Medication 1000 MILLIGRAM(S): at 21:39

## 2021-07-28 RX ADMIN — Medication 1000 MILLIGRAM(S): at 14:29

## 2021-07-28 RX ADMIN — SODIUM CHLORIDE 1000 MILLILITER(S): 9 INJECTION INTRAMUSCULAR; INTRAVENOUS; SUBCUTANEOUS at 15:50

## 2021-07-28 RX ADMIN — ONDANSETRON 4 MILLIGRAM(S): 8 TABLET, FILM COATED ORAL at 06:53

## 2021-07-28 RX ADMIN — Medication 50 MILLIGRAM(S): at 06:54

## 2021-07-28 RX ADMIN — Medication 1 TABLET(S): at 11:42

## 2021-07-28 RX ADMIN — Medication 1000 MILLIGRAM(S): at 15:41

## 2021-07-28 RX ADMIN — MAGNESIUM OXIDE 400 MG ORAL TABLET 400 MILLIGRAM(S): 241.3 TABLET ORAL at 18:17

## 2021-07-28 RX ADMIN — ESCITALOPRAM OXALATE 20 MILLIGRAM(S): 10 TABLET, FILM COATED ORAL at 11:44

## 2021-07-28 RX ADMIN — ENOXAPARIN SODIUM 40 MILLIGRAM(S): 100 INJECTION SUBCUTANEOUS at 21:40

## 2021-07-28 RX ADMIN — Medication 30 MILLIGRAM(S): at 14:28

## 2021-07-28 RX ADMIN — METHOCARBAMOL 750 MILLIGRAM(S): 500 TABLET, FILM COATED ORAL at 14:29

## 2021-07-28 RX ADMIN — Medication 50 MILLIGRAM(S): at 21:39

## 2021-07-28 RX ADMIN — Medication 5 MILLIGRAM(S): at 21:39

## 2021-07-28 RX ADMIN — Medication 50 GRAM(S): at 11:43

## 2021-07-28 RX ADMIN — OXYCODONE HYDROCHLORIDE 10 MILLIGRAM(S): 5 TABLET ORAL at 12:50

## 2021-07-28 RX ADMIN — Medication 30 MILLIGRAM(S): at 15:49

## 2021-07-28 RX ADMIN — OXYCODONE HYDROCHLORIDE 10 MILLIGRAM(S): 5 TABLET ORAL at 00:00

## 2021-07-28 RX ADMIN — MAGNESIUM HYDROXIDE 30 MILLILITER(S): 400 TABLET, CHEWABLE ORAL at 11:41

## 2021-07-28 RX ADMIN — Medication 1 TABLET(S): at 14:29

## 2021-07-28 RX ADMIN — METHOCARBAMOL 750 MILLIGRAM(S): 500 TABLET, FILM COATED ORAL at 06:54

## 2021-07-28 RX ADMIN — SODIUM CHLORIDE 1000 MILLILITER(S): 9 INJECTION INTRAMUSCULAR; INTRAVENOUS; SUBCUTANEOUS at 17:26

## 2021-07-28 RX ADMIN — Medication 30 MILLIGRAM(S): at 21:40

## 2021-07-28 RX ADMIN — Medication 1000 MILLIGRAM(S): at 06:53

## 2021-07-28 RX ADMIN — METHOCARBAMOL 750 MILLIGRAM(S): 500 TABLET, FILM COATED ORAL at 21:39

## 2021-07-28 RX ADMIN — ONDANSETRON 4 MILLIGRAM(S): 8 TABLET, FILM COATED ORAL at 18:17

## 2021-07-28 RX ADMIN — OXYCODONE HYDROCHLORIDE 10 MILLIGRAM(S): 5 TABLET ORAL at 11:41

## 2021-07-28 RX ADMIN — OXYCODONE HYDROCHLORIDE 10 MILLIGRAM(S): 5 TABLET ORAL at 06:54

## 2021-07-28 RX ADMIN — OXYCODONE HYDROCHLORIDE 10 MILLIGRAM(S): 5 TABLET ORAL at 08:04

## 2021-07-28 RX ADMIN — POLYETHYLENE GLYCOL 3350 17 GRAM(S): 17 POWDER, FOR SOLUTION ORAL at 18:24

## 2021-07-28 RX ADMIN — Medication 30 MILLIGRAM(S): at 22:00

## 2021-07-28 RX ADMIN — SODIUM CHLORIDE 1000 MILLILITER(S): 9 INJECTION INTRAMUSCULAR; INTRAVENOUS; SUBCUTANEOUS at 18:22

## 2021-07-28 NOTE — PROGRESS NOTE ADULT - SUBJECTIVE AND OBJECTIVE BOX
NEUROSURGERY PAIN MANAGEMENT NOTE    Chief Complaint: pain radiating to RLE    HPI:  The patient is a 56-year-old man who is in back pain for several years it is worsened recently and he has had shooting pain going down his right leg. He has tried physical therapy and epidural steroid injections as well as medications with no relief     He states the symptoms are worsening. The patient mentions the symptoms started 2 year(s) ago. His symptoms occur while walking, sitting, standing and lifting.       Modifying factors - worsened by bending, worsened by prolonged standing and worsened by walking. Relieving factors include relieved by recumbency and relieved by rest, but not relieved by exercise regimen, not relieved by nonsteroidal anti-inflammatory drugs and not relieved by physical therapy.    (26 Jul 2021 06:36)      PAST MEDICAL & SURGICAL HISTORY:  Dyslipidemia    Prostate cancer    COVID-19  march 2020    Back pain    Anxiety    Nephrolithiasis    Moderate obesity    H/O prostatectomy  april 2021        FAMILY HISTORY:      SOCIAL HISTORY:  [ ] Denies Smoking, Alcohol, or Drug Use    HOME MEDICATIONS:   Please refer to initial HNP    PAIN HOME MEDICATIONS:    Allergies    No Known Allergies    Intolerances        PAIN MEDICATIONS:  acetaminophen   Tablet .. 1000 milliGRAM(s) Oral every 8 hours  escitalopram 20 milliGRAM(s) Oral daily  ketorolac   Injectable 30 milliGRAM(s) IV Push every 8 hours  methocarbamol 750 milliGRAM(s) Oral every 8 hours  ondansetron    Tablet 4 milliGRAM(s) Oral every 6 hours  oxyCODONE    IR 5 milliGRAM(s) Oral every 4 hours PRN  oxyCODONE    IR 10 milliGRAM(s) Oral every 4 hours PRN  pregabalin 50 milliGRAM(s) Oral three times a day    Heme:  enoxaparin Injectable 40 milliGRAM(s) SubCutaneous at bedtime    Antibiotics:    Cardiovascular:    GI:  polyethylene glycol/electrolyte Solution. 4000 milliLiter(s) Oral once  senna 2 Tablet(s) Oral at bedtime    Endocrine:    All Other Medications:  BUpivacaine liposome 1.3% Injectable (no eMAR) 20 milliLiter(s) Local Injection once  naloxone Injectable 0.1 milliGRAM(s) IV Push every 3 minutes PRN  sodium chloride 0.9% Bolus 500 milliLiter(s) IV Bolus once      Vital Signs Last 24 Hrs  T(C): 37.1 (28 Jul 2021 14:16), Max: 37.1 (28 Jul 2021 14:16)  T(F): 98.8 (28 Jul 2021 14:16), Max: 98.8 (28 Jul 2021 14:16)  HR: 65 (28 Jul 2021 16:04) (51 - 65)  BP: 82/47 (28 Jul 2021 16:04) (82/47 - 104/56)  BP(mean): --  RR: 18 (28 Jul 2021 14:16) (15 - 18)  SpO2: 96% (28 Jul 2021 16:04) (95% - 98%)    LABS:                        9.3    7.49  )-----------( 129      ( 28 Jul 2021 12:28 )             30.8     07-28    141  |  107  |  20  ----------------------------<  122<H>  4.1   |  26  |  1.29    Ca    8.5      28 Jul 2021 08:49  Phos  2.3     07-28  Mg     1.8     07-28            RADIOLOGY:    Drug Screen:        REVIEW OF SYSTEMS:  CONSTITUTIONAL: No fever or fatigue O/N.   EYES: No eye pain, visual disturbances  ENMT:  No difficulty hearing. No throat pain  NECK: No pain or stiffness  RESPIRATORY: No cough, wheezing; No shortness of breath  CARDIOVASCULAR: No chest pain, palpitations.   GASTROINTESTINAL: Pt reports passing gas. No bowel movements. No abdominal or epigastric pain. No nausea, vomiting. GENITOURINARY: No dysuria, frequency, or incontinence  NEUROLOGICAL: No headaches, loss of strength, numbness, or tremors. No dizzinesss or lightheadedness with pain medications.   MUSCULOSKELETAL: Incisional back pain. No joint pain or swelling; No muscle, or extremity pain    PAIN ASSESSMENT: c/o back pain and stiffness    PHYSICAL EXAM  GENERAL: Laying in bed, NAD  Neuro: CN II-XII PERRL, EOMI  Cranial nerves grossly intact  Motor exam: MAEx4 with good strength  Sensation intact to LT in UE/LE in 3 dermatomes  CHEST/LUNG: Clear to auscultation bilaterally; No rales, rhonchi, wheezing, or rubs  HEART: Regular rate and rhythm; No murmurs, rubs, or gallops  ABDOMEN: Soft, Nontender, Nondistended; Bowel sounds present  EXTREMITIES:  2+ Peripheral Pulses, No clubbing, cyanosis, or edema  SKIN: No rashes or lesions      ASSESSMENT:   56yMale with chronic LBP with radiculopathy now s/p L4-S1 TLIF, L3-pelvis (S2AI) fusion w/ plastics closure (7/26).      PLAN:   - Pain:    Tylenol 1000mg every 8 hours    Lyrica 50mg every 8 hours    Robaxin to 750mg every 8 hours    Oxycodone 5/10mg every 4 hours PRN mod/severe pain    Start Toradol 30mg IV every 8 hours x3 days    - Bowel regimen: Senna    - Nausea ppx: Zofran standing  - Functional Goals: Pt will get OOB with PT today. Pt will resume previous level of activity without impairment from surgery.   - Additional Consults: None recommended.   - Additional Labs/Imaging:  None recommended.   - Follow up, Discharge Planning: home when medically ready  - Pain Management follow up plan: will cont to follow    d/w Dr. Rolon

## 2021-07-28 NOTE — PROGRESS NOTE ADULT - SUBJECTIVE AND OBJECTIVE BOX
HPI:  The patient is a 56-year-old man who is in back pain for several years it is worsened recently and he has had shooting pain going down his right leg. He has tried physical therapy and epidural steroid injections as well as medications with no relief     He states the symptoms are worsening. The patient mentions the symptoms started 2 year(s) ago. His symptoms occur while walking, sitting, standing and lifting.     Modifying factors - worsened by bending, worsened by prolonged standing and worsened by walking. Relieving factors include relieved by recumbency and relieved by rest, but not relieved by exercise regimen, not relieved by nonsteroidal anti-inflammatory drugs and not relieved by physical therapy.    (26 Jul 2021 06:36)    OVERNIGHT EVENTS: Nick and hypotensive overnight, Hgb stable. S/p 500 cc bolus. Pain controlled    Hospital Course:  7/26: POD0 L4-S1 TLIF, L3-pelvic fusion  7/27: POD1, Nick, hypotensive overnight s/p 500 cc bolus. STAT CBC o/n for high drain output in setting of low SBP and bradycardia, Hgb stable. neuro stable. F/u lopez removal. Standing xrays before discharge   7/28: POD 2. CAREY o/n.     Vital Signs Last 24 Hrs  T(C): 36.7 (28 Jul 2021 02:36), Max: 36.8 (27 Jul 2021 23:08)  T(F): 98.1 (28 Jul 2021 02:36), Max: 98.2 (27 Jul 2021 23:08)  HR: 51 (28 Jul 2021 02:36) (51 - 76)  BP: 101/51 (28 Jul 2021 02:36) (97/53 - 120/63)  BP(mean): --  RR: 16 (28 Jul 2021 02:36) (16 - 19)  SpO2: 96% (28 Jul 2021 02:36) (94% - 96%)    I&O's Detail    26 Jul 2021 07:01  -  27 Jul 2021 07:00  --------------------------------------------------------  IN:    Oral Fluid: 560 mL    sodium chloride 0.9% w/ Additives: 525 mL  Total IN: 1085 mL    OUT:    Accordian (mL): 630 mL    Bulb (mL): 10 mL    Indwelling Catheter - Urethral (mL): 1160 mL  Total OUT: 1800 mL    Total NET: -715 mL    27 Jul 2021 07:01  -  28 Jul 2021 05:25  --------------------------------------------------------  IN:    Oral Fluid: 480 mL  Total IN: 480 mL    OUT:    Accordian (mL): 470 mL    Bulb (mL): 102 mL    Voided (mL): 300 mL  Total OUT: 872 mL    Total NET: -392 mL        I&O's Summary    26 Jul 2021 07:01  -  27 Jul 2021 07:00  --------------------------------------------------------  IN: 1085 mL / OUT: 1800 mL / NET: -715 mL    27 Jul 2021 07:01  -  28 Jul 2021 05:25  --------------------------------------------------------  IN: 480 mL / OUT: 872 mL / NET: -392 mL    PHYSICAL EXAM:  General: patient seen laying supine in bed in NAD  Neuro: AAOx3, FC, OE spontaneously, speech clear and fluent,  face symmetric, strength UEs 5/5, b/l LEs HF/KF/KE 3/5 DF/PF 5/5 greatly pain limited, sensation intact to light touch throughout  HEENT: PERRL, EOMI  Neck: supple  Cardiac: RRR, S1S2  Pulmonary: chest rise symmetric  Abdomen: soft, nontender, nondistended  Ext: warm, perfusing well  Wound: Back incision dressing c/d/i, HMV x 1, JPx1    TUBES/LINES:  [] Lopez  [] Lumbar Drain  [x] Wound Drains  [] Others    DIET:  [] NPO  [x] Mechanical  [] Tube feeds      LABS:                        9.9    9.32  )-----------( 169      ( 27 Jul 2021 12:22 )             33.6     07-27    136  |  108  |  22  ----------------------------<  121<H>  4.8   |  24  |  1.31<H>    Ca    8.8      27 Jul 2021 12:22  Phos  4.7     07-27  Mg     1.9     07-27      CAPILLARY BLOOD GLUCOSE    Drug Levels: [] N/A    CSF Analysis: [] N/A      Allergies    No Known Allergies    Intolerances    MEDICATIONS:  Antibiotics:    Neuro:  acetaminophen   Tablet .. 1000 milliGRAM(s) Oral every 8 hours  escitalopram 20 milliGRAM(s) Oral daily  methocarbamol 750 milliGRAM(s) Oral every 8 hours  ondansetron    Tablet 4 milliGRAM(s) Oral every 6 hours  oxyCODONE    IR 5 milliGRAM(s) Oral every 4 hours PRN  oxyCODONE    IR 10 milliGRAM(s) Oral every 4 hours PRN  pregabalin 50 milliGRAM(s) Oral three times a day    Anticoagulation:  enoxaparin Injectable 40 milliGRAM(s) SubCutaneous at bedtime    OTHER:  BUpivacaine liposome 1.3% Injectable (no eMAR) 20 milliLiter(s) Local Injection once  magnesium hydroxide Suspension 30 milliLiter(s) Oral every 12 hours PRN  naloxone Injectable 0.1 milliGRAM(s) IV Push every 3 minutes PRN  senna 2 Tablet(s) Oral at bedtime    IVF:    CULTURES:    RADIOLOGY & ADDITIONAL TESTS:    ASSESSMENT:  56yMale with chronic LBP with radiculopathy now s/p L4-S1 TLIF, L3-pelvis (S2AI) fusion w/ plastics closure (7/26).    Plan:  Neuro:  - neuro/vital [id number]  - pain control, PCA  - monitor HMV/EDGAR outputs, plastics closure   - standing xrays before d/c     Cardiac:  - normotensive BP goal    Pulmonary:  - on RA    GI:  - ADAT  - bowel regimen    Renal:  - IVF until tolerating PO diet  - Remove lopez this morning     Heme:  - Trend H/H  - SCDs    Endo:  - no issues    ID:  - postop ancef  - afebrile monitor for leukocytosis    DISPO  - PT/OT reccs home no needs     D/w Dr. Guillory

## 2021-07-28 NOTE — PROGRESS NOTE ADULT - SUBJECTIVE AND OBJECTIVE BOX
Patient is a 56y old  Male who presents with a chief complaint of lumbar stenosis (27 Jul 2021 12:49)      HPI:  The patient is a 56-year-old man who is in back pain for several years it is worsened recently and he has had shooting pain going down his right leg. He has tried physical therapy and epidural steroid injections as well as medications with no relief     He states the symptoms are worsening. The patient mentions the symptoms started 2 year(s) ago. His symptoms occur while walking, sitting, standing and lifting.       Modifying factors - worsened by bending, worsened by prolonged standing and worsened by walking. Relieving factors include relieved by recumbency and relieved by rest, but not relieved by exercise regimen, not relieved by nonsteroidal anti-inflammatory drugs and not relieved by physical therapy.    (26 Jul 2021 06:36)    Subjective:  Pt has no acute complaints. Feels that pain is well controlled    Allergies    No Known Allergies    Intolerances    Home meds: Lexapro    MEDICATIONS  (STANDING):  acetaminophen   Tablet .. 1000 milliGRAM(s) Oral every 8 hours  BUpivacaine liposome 1.3% Injectable (no eMAR) 20 milliLiter(s) Local Injection once  enoxaparin Injectable 40 milliGRAM(s) SubCutaneous at bedtime  escitalopram 20 milliGRAM(s) Oral daily  methocarbamol 750 milliGRAM(s) Oral every 8 hours  ondansetron    Tablet 4 milliGRAM(s) Oral every 6 hours  pregabalin 50 milliGRAM(s) Oral three times a day  senna 2 Tablet(s) Oral at bedtime    MEDICATIONS  (PRN):  magnesium hydroxide Suspension 30 milliLiter(s) Oral every 12 hours PRN Constipation  naloxone Injectable 0.1 milliGRAM(s) IV Push every 3 minutes PRN For ANY of the following changes in patient status:  A. RR LESS THAN 10 breaths per minute, B. Oxygen saturation LESS THAN 90%, C. Sedation score of 6  oxyCODONE    IR 5 milliGRAM(s) Oral every 4 hours PRN Moderate Pain (4 - 6)  oxyCODONE    IR 10 milliGRAM(s) Oral every 4 hours PRN Severe Pain (7 - 10)      Drug Dosing Weight  Height (cm): 175.3 (26 Jul 2021 06:29)  Weight (kg): 98.4 (26 Jul 2021 06:29)  BMI (kg/m2): 32 (26 Jul 2021 06:29)  BSA (m2): 2.14 (26 Jul 2021 06:29)    PAST MEDICAL & SURGICAL HISTORY:  Dyslipidemia    Prostate cancer    COVID-19  march 2020    Back pain    Anxiety    Nephrolithiasis    Moderate obesity    H/O prostatectomy  april 2021        FAMILY HISTORY:  no cardiac disease    SOCIAL HISTORY:  no smoking  ADVANCE DIRECTIVES:    Vital Signs Last 24 Hrs  T(C): 36.7 (27 Jul 2021 08:49), Max: 37 (26 Jul 2021 20:14)  T(F): 98 (27 Jul 2021 08:49), Max: 98.6 (26 Jul 2021 20:14)  HR: 76 (27 Jul 2021 10:58) (45 - 77)  BP: 109/57 (27 Jul 2021 10:58) (84/47 - 111/61)  BP(mean): 75 (26 Jul 2021 20:14) (65 - 78)  ABP: 99/63 (26 Jul 2021 18:44) (99/63 - 120/71)  ABP(mean): 77 (26 Jul 2021 18:44) (77 - 87)  RR: 18 (27 Jul 2021 10:58) (10 - 24)  SpO2: 96% (27 Jul 2021 10:58) (93% - 99%)      ABG - ( 26 Jul 2021 08:41 )  pH, Arterial: 7.32  pH, Blood: x     /  pCO2: 45    /  pO2: 135   / HCO3: 23    / Base Excess: -3.0  /  SaO2: x                   I&O's Detail    26 Jul 2021 07:01  -  27 Jul 2021 07:00  --------------------------------------------------------  IN:    Oral Fluid: 560 mL    sodium chloride 0.9% w/ Additives: 525 mL  Total IN: 1085 mL    OUT:    Accordian (mL): 630 mL    Bulb (mL): 10 mL    Indwelling Catheter - Urethral (mL): 1160 mL  Total OUT: 1800 mL    Total NET: -715 mL      27 Jul 2021 07:01  -  27 Jul 2021 14:19  --------------------------------------------------------  IN:  Total IN: 0 mL    OUT:    Accordian (mL): 150 mL    Bulb (mL): 25 mL  Total OUT: 175 mL    Total NET: -175 mL          PHYSICAL EXAM:      Constitutional: NAD  Eyes: PERRLA  ENMT: MMM  Neck: supple  Back: midline  Respiratory: CTA b/l  Cardiovascular: rrr, s1s2, no m/r/g  Gastrointestinal: soft, NTND, + BS  Extremities: wwp  Vascular: + 2 pulses radial  Neurological: AAO x 4  Skin: no rash  Lymph Nodes: no LAD  Musculoskeletal: no joint swelling  Psychiatric: normal affect        LABS:  CBC Full  -  ( 27 Jul 2021 12:22 )  WBC Count : 9.32 K/uL  RBC Count : 5.18 M/uL  Hemoglobin : 9.9 g/dL  Hematocrit : 33.6 %  Platelet Count - Automated : 169 K/uL  Mean Cell Volume : 64.9 fl  Mean Cell Hemoglobin : 19.1 pg  Mean Cell Hemoglobin Concentration : 29.5 gm/dL  Auto Neutrophil # : x  Auto Lymphocyte # : x  Auto Monocyte # : x  Auto Eosinophil # : x  Auto Basophil # : x  Auto Neutrophil % : x  Auto Lymphocyte % : x  Auto Monocyte % : x  Auto Eosinophil % : x  Auto Basophil % : x    07-27    136  |  108  |  22  ----------------------------<  121<H>  4.8   |  24  |  1.31<H>    Ca    8.8      27 Jul 2021 12:22  Phos  4.7     07-27  Mg     1.9     07-27      CAPILLARY BLOOD GLUCOSE      POCT Blood Glucose.: 107 mg/dL (26 Jul 2021 08:49)          EKG:    ECHO, US:        RADIOLOGY:  < from: CT Lumbar Spine No Cont (07.17.21 @ 07:56) >  IMPRESSION:    CT lumbar spine for surgical planning.    1.  Multilevel degenerative changes, better appreciated on the MRI dated 6/14/2021 and notable for:    2.  L3-4 moderate foraminal narrowing.    3.  L4-5 grade I-II anterolisthesis with severe spinal stenosis and moderate/severe bilateral foraminal narrowing. Prominent facet arthropathy.    4.  L5-S1 severe bilateral foraminal narrowing.    5.  Schmorl's node along the L4 inferior endplate with surrounding degenerative changes.    < end of copied text >

## 2021-07-28 NOTE — PROGRESS NOTE ADULT - SUBJECTIVE AND OBJECTIVE BOX
Pt seen and examined.   Comfortable.     AVSS, NAD.  Incision c/d/i. Dressings changed.   Drains: HV - 550mL/24 hours; EDGAR - 120 mL/24 hours

## 2021-07-29 DIAGNOSIS — I10 ESSENTIAL (PRIMARY) HYPERTENSION: ICD-10-CM

## 2021-07-29 LAB
ANION GAP SERPL CALC-SCNC: 6 MMOL/L — SIGNIFICANT CHANGE UP (ref 5–17)
BUN SERPL-MCNC: 19 MG/DL — SIGNIFICANT CHANGE UP (ref 7–23)
CALCIUM SERPL-MCNC: 8.3 MG/DL — LOW (ref 8.4–10.5)
CHLORIDE SERPL-SCNC: 108 MMOL/L — SIGNIFICANT CHANGE UP (ref 96–108)
CO2 SERPL-SCNC: 27 MMOL/L — SIGNIFICANT CHANGE UP (ref 22–31)
CREAT SERPL-MCNC: 1.26 MG/DL — SIGNIFICANT CHANGE UP (ref 0.5–1.3)
GLUCOSE BLDC GLUCOMTR-MCNC: 99 MG/DL — SIGNIFICANT CHANGE UP (ref 70–99)
GLUCOSE SERPL-MCNC: 93 MG/DL — SIGNIFICANT CHANGE UP (ref 70–99)
HCT VFR BLD CALC: 25.6 % — LOW (ref 39–50)
HCT VFR BLD CALC: 26.6 % — LOW (ref 39–50)
HGB BLD-MCNC: 7.7 G/DL — LOW (ref 13–17)
HGB BLD-MCNC: 7.7 G/DL — LOW (ref 13–17)
MAGNESIUM SERPL-MCNC: 2.2 MG/DL — SIGNIFICANT CHANGE UP (ref 1.6–2.6)
MCHC RBC-ENTMCNC: 18.8 PG — LOW (ref 27–34)
MCHC RBC-ENTMCNC: 19.4 PG — LOW (ref 27–34)
MCHC RBC-ENTMCNC: 28.9 GM/DL — LOW (ref 32–36)
MCHC RBC-ENTMCNC: 30.1 GM/DL — LOW (ref 32–36)
MCV RBC AUTO: 64.6 FL — LOW (ref 80–100)
MCV RBC AUTO: 64.9 FL — LOW (ref 80–100)
NRBC # BLD: 0 /100 WBCS — SIGNIFICANT CHANGE UP (ref 0–0)
NRBC # BLD: 0 /100 WBCS — SIGNIFICANT CHANGE UP (ref 0–0)
PHOSPHATE SERPL-MCNC: 2.3 MG/DL — LOW (ref 2.5–4.5)
PLATELET # BLD AUTO: 118 K/UL — LOW (ref 150–400)
PLATELET # BLD AUTO: 123 K/UL — LOW (ref 150–400)
POTASSIUM SERPL-MCNC: 4.4 MMOL/L — SIGNIFICANT CHANGE UP (ref 3.5–5.3)
POTASSIUM SERPL-SCNC: 4.4 MMOL/L — SIGNIFICANT CHANGE UP (ref 3.5–5.3)
RBC # BLD: 3.96 M/UL — LOW (ref 4.2–5.8)
RBC # BLD: 4.1 M/UL — LOW (ref 4.2–5.8)
RBC # FLD: 15.7 % — HIGH (ref 10.3–14.5)
RBC # FLD: 15.7 % — HIGH (ref 10.3–14.5)
SODIUM SERPL-SCNC: 141 MMOL/L — SIGNIFICANT CHANGE UP (ref 135–145)
WBC # BLD: 5.61 K/UL — SIGNIFICANT CHANGE UP (ref 3.8–10.5)
WBC # BLD: 5.72 K/UL — SIGNIFICANT CHANGE UP (ref 3.8–10.5)
WBC # FLD AUTO: 5.61 K/UL — SIGNIFICANT CHANGE UP (ref 3.8–10.5)
WBC # FLD AUTO: 5.72 K/UL — SIGNIFICANT CHANGE UP (ref 3.8–10.5)

## 2021-07-29 PROCEDURE — 99233 SBSQ HOSP IP/OBS HIGH 50: CPT | Mod: GC

## 2021-07-29 RX ORDER — ESCITALOPRAM OXALATE 10 MG/1
20 TABLET, FILM COATED ORAL DAILY
Refills: 0 | Status: DISCONTINUED | OUTPATIENT
Start: 2021-07-30 | End: 2021-08-01

## 2021-07-29 RX ORDER — LACTULOSE 10 G/15ML
10 SOLUTION ORAL ONCE
Refills: 0 | Status: COMPLETED | OUTPATIENT
Start: 2021-07-29 | End: 2021-07-29

## 2021-07-29 RX ORDER — SODIUM,POTASSIUM PHOSPHATES 278-250MG
1 POWDER IN PACKET (EA) ORAL ONCE
Refills: 0 | Status: COMPLETED | OUTPATIENT
Start: 2021-07-29 | End: 2021-07-29

## 2021-07-29 RX ORDER — ESCITALOPRAM OXALATE 10 MG/1
20 TABLET, FILM COATED ORAL DAILY
Refills: 0 | Status: DISCONTINUED | OUTPATIENT
Start: 2021-07-29 | End: 2021-07-29

## 2021-07-29 RX ORDER — FAMOTIDINE 10 MG/ML
20 INJECTION INTRAVENOUS ONCE
Refills: 0 | Status: COMPLETED | OUTPATIENT
Start: 2021-07-29 | End: 2021-07-29

## 2021-07-29 RX ADMIN — ESCITALOPRAM OXALATE 20 MILLIGRAM(S): 10 TABLET, FILM COATED ORAL at 08:34

## 2021-07-29 RX ADMIN — Medication 30 MILLIGRAM(S): at 13:52

## 2021-07-29 RX ADMIN — Medication 75 MILLIGRAM(S): at 13:54

## 2021-07-29 RX ADMIN — ENOXAPARIN SODIUM 40 MILLIGRAM(S): 100 INJECTION SUBCUTANEOUS at 21:47

## 2021-07-29 RX ADMIN — LACTULOSE 10 GRAM(S): 10 SOLUTION ORAL at 13:51

## 2021-07-29 RX ADMIN — Medication 30 MILLIGRAM(S): at 05:37

## 2021-07-29 RX ADMIN — SENNA PLUS 2 TABLET(S): 8.6 TABLET ORAL at 21:46

## 2021-07-29 RX ADMIN — Medication 1000 MILLIGRAM(S): at 14:30

## 2021-07-29 RX ADMIN — METHOCARBAMOL 750 MILLIGRAM(S): 500 TABLET, FILM COATED ORAL at 13:52

## 2021-07-29 RX ADMIN — Medication 1000 MILLIGRAM(S): at 05:06

## 2021-07-29 RX ADMIN — Medication 30 MILLIGRAM(S): at 22:20

## 2021-07-29 RX ADMIN — Medication 75 MILLIGRAM(S): at 21:47

## 2021-07-29 RX ADMIN — Medication 1 PACKET(S): at 11:32

## 2021-07-29 RX ADMIN — Medication 30 MILLIGRAM(S): at 05:07

## 2021-07-29 RX ADMIN — ONDANSETRON 4 MILLIGRAM(S): 8 TABLET, FILM COATED ORAL at 13:52

## 2021-07-29 RX ADMIN — METHOCARBAMOL 750 MILLIGRAM(S): 500 TABLET, FILM COATED ORAL at 05:06

## 2021-07-29 RX ADMIN — FAMOTIDINE 20 MILLIGRAM(S): 10 INJECTION INTRAVENOUS at 18:44

## 2021-07-29 RX ADMIN — Medication 30 MILLIGRAM(S): at 14:12

## 2021-07-29 RX ADMIN — Medication 1000 MILLIGRAM(S): at 22:20

## 2021-07-29 RX ADMIN — Medication 1000 MILLIGRAM(S): at 05:40

## 2021-07-29 RX ADMIN — METHOCARBAMOL 750 MILLIGRAM(S): 500 TABLET, FILM COATED ORAL at 21:47

## 2021-07-29 RX ADMIN — Medication 50 MILLIGRAM(S): at 05:06

## 2021-07-29 RX ADMIN — Medication 30 MILLIGRAM(S): at 21:48

## 2021-07-29 RX ADMIN — Medication 1000 MILLIGRAM(S): at 13:52

## 2021-07-29 RX ADMIN — OXYCODONE HYDROCHLORIDE 5 MILLIGRAM(S): 5 TABLET ORAL at 16:58

## 2021-07-29 RX ADMIN — Medication 1000 MILLIGRAM(S): at 21:47

## 2021-07-29 RX ADMIN — OXYCODONE HYDROCHLORIDE 5 MILLIGRAM(S): 5 TABLET ORAL at 17:30

## 2021-07-29 RX ADMIN — ONDANSETRON 4 MILLIGRAM(S): 8 TABLET, FILM COATED ORAL at 18:45

## 2021-07-29 NOTE — PROGRESS NOTE ADULT - SUBJECTIVE AND OBJECTIVE BOX
HPI:  The patient is a 56-year-old man who is in back pain for several years it is worsened recently and he has had shooting pain going down his right leg. He has tried physical therapy and epidural steroid injections as well as medications with no relief     He states the symptoms are worsening. The patient mentions the symptoms started 2 year(s) ago. His symptoms occur while walking, sitting, standing and lifting.       Modifying factors - worsened by bending, worsened by prolonged standing and worsened by walking. Relieving factors include relieved by recumbency and relieved by rest, but not relieved by exercise regimen, not relieved by nonsteroidal anti-inflammatory drugs and not relieved by physical therapy.    (26 Jul 2021 06:36)    OVERNIGHT EVENTS:  Vital Signs Last 24 Hrs  T(C): 36.6 (29 Jul 2021 14:23), Max: 36.9 (28 Jul 2021 20:45)  T(F): 97.9 (29 Jul 2021 14:23), Max: 98.4 (28 Jul 2021 20:45)  HR: 69 (29 Jul 2021 14:23) (57 - 69)  BP: 102/65 (29 Jul 2021 14:23) (83/56 - 130/72)  BP(mean): --  RR: 18 (29 Jul 2021 14:23) (17 - 20)  SpO2: 94% (29 Jul 2021 14:23) (90% - 97%)    I&O's Summary    28 Jul 2021 07:01  -  29 Jul 2021 07:00  --------------------------------------------------------  IN: 2765 mL / OUT: 500 mL / NET: 2265 mL    29 Jul 2021 07:01  -  29 Jul 2021 16:32  --------------------------------------------------------  IN: 0 mL / OUT: 100 mL / NET: -100 mL        PHYSICAL EXAM:      TUBES/LINES:  [] Wen  [] Lumbar Drain  [] Wound Drains  [] Others      DIET:  [] NPO  [] Mechanical  [] Tube feeds    LABS:                        7.7    5.61  )-----------( 123      ( 29 Jul 2021 12:38 )             25.6     07-29    141  |  108  |  19  ----------------------------<  93  4.4   |  27  |  1.26    Ca    8.3<L>      29 Jul 2021 08:02  Phos  2.3     07-29  Mg     2.2     07-29              CAPILLARY BLOOD GLUCOSE      POCT Blood Glucose.: 99 mg/dL (29 Jul 2021 07:15)      Drug Levels: [] N/A    CSF Analysis: [] N/A      Allergies    No Known Allergies    Intolerances      MEDICATIONS:  Antibiotics:    Neuro:  acetaminophen   Tablet .. 1000 milliGRAM(s) Oral every 8 hours  escitalopram 20 milliGRAM(s) Oral daily  ketorolac   Injectable 30 milliGRAM(s) IV Push every 8 hours  methocarbamol 750 milliGRAM(s) Oral every 8 hours  ondansetron    Tablet 4 milliGRAM(s) Oral every 6 hours  oxyCODONE    IR 5 milliGRAM(s) Oral every 4 hours PRN  oxyCODONE    IR 10 milliGRAM(s) Oral every 4 hours PRN  pregabalin 75 milliGRAM(s) Oral every 8 hours    Anticoagulation:  enoxaparin Injectable 40 milliGRAM(s) SubCutaneous at bedtime    OTHER:  bisacodyl 5 milliGRAM(s) Oral at bedtime  BUpivacaine liposome 1.3% Injectable (no eMAR) 20 milliLiter(s) Local Injection once  naloxone Injectable 0.1 milliGRAM(s) IV Push every 3 minutes PRN  polyethylene glycol 3350 17 Gram(s) Oral daily  senna 2 Tablet(s) Oral at bedtime    IVF:    CULTURES:    RADIOLOGY & ADDITIONAL TESTS:      ASSESSMENT:  Assessment: 56yMale with Obesity, HLD, h/o Prostate cancer, Anxiety, chronic LBP with radiculopathy now s/p L4-S1 TLIF, L3-pelvis (S2AI) fusion w/ plastics closure (7/26).    M54.16    M54.16/M47.817/S31.843ZI10.26    Handoff    MEWS Score    Dyslipidemia    Prostate cancer    COVID-19    Back pain    Anxiety    Nephrolithiasis    Moderate obesity    Spondylolisthesis at L4-L5 level    Spondylolisthesis at L4-L5 level    Back pain    Anemia due to acute blood loss    Thrombocytopenia    Insertion of Wen catheter    Lumbar spinal fusion    No significant past surgical history    H/O prostatectomy    SysAdmin_VstLnk      PLAN:  Neuro:  - neuro/vital [id number]  - pain control, transitioned to orals  - monitor HMV/EDGAR outputs, plastics closure  - standing xrays before d/c    Cardiac:  - normotensive BP goal    Pulmonary:  - on RA    GI:  - Reg diet  - no BM; lactulose added to bowel reg.     Renal:  - Trend lytes    Heme:  - Trend H/H  - SCDs    Endo:  - no issues    ID:  - postop ancef  - afebrile monitor for leukocytosis      Dispo: Full code. Pending home w/no needs.    D/w Dr. Guillory      DVT PROPHYLAXIS:  [] Venodynes                                [] Heparin/Lovenox    DISPOSITION:    Assessment:  Present when checked    []  GCS  E   V  M     Heart Failure: []Acute, [] acute on chronic , []chronic  Heart Failure:  [] Diastolic (HFpEF), [] Systolic (HFrEF), []Combined (HFpEF and HFrEF), [] RHF, [] Pulm HTN, [] Other    [] YVETTE, [] ATN, [] AIN, [] other  [] CKD1, [] CKD2, [] CKD 3, [] CKD 4, [] CKD 5, []ESRD    Encephalopathy: [] Metabolic, [] Hepatic, [] toxic, [] Neurological, [] Other    Abnormal Nurtitional Status: [] malnurtition (see nutrition note), [ ]underweight: BMI < 19, [] morbid obesity: BMI >40, [] Cachexia    [] Sepsis  [] hypovolemic shock,[] cardiogenic shock, [] hemorrhagic shock, [] neuogenic shock  [] Acute Respiratory Failure  []Cerebral edema, [] Brain compression/ herniation,   [] Functional quadriplegia  [] Acute blood loss anemia  HPI:  The patient is a 56-year-old man who is in back pain for several years it is worsened recently and he has had shooting pain going down his right leg. He has tried physical therapy and epidural steroid injections as well as medications with no relief     He states the symptoms are worsening. The patient mentions the symptoms started 2 year(s) ago. His symptoms occur while walking, sitting, standing and lifting.       Modifying factors - worsened by bending, worsened by prolonged standing and worsened by walking. Relieving factors include relieved by recumbency and relieved by rest, but not relieved by exercise regimen, not relieved by nonsteroidal anti-inflammatory drugs and not relieved by physical therapy.    (26 Jul 2021 06:36)    OVERNIGHT EVENTS:  Vital Signs Last 24 Hrs  T(C): 36.6 (29 Jul 2021 14:23), Max: 36.9 (28 Jul 2021 20:45)  T(F): 97.9 (29 Jul 2021 14:23), Max: 98.4 (28 Jul 2021 20:45)  HR: 69 (29 Jul 2021 14:23) (57 - 69)  BP: 102/65 (29 Jul 2021 14:23) (83/56 - 130/72)  BP(mean): --  RR: 18 (29 Jul 2021 14:23) (17 - 20)  SpO2: 94% (29 Jul 2021 14:23) (90% - 97%)    I&O's Summary    28 Jul 2021 07:01  -  29 Jul 2021 07:00  --------------------------------------------------------  IN: 2765 mL / OUT: 500 mL / NET: 2265 mL    29 Jul 2021 07:01  -  29 Jul 2021 16:32  --------------------------------------------------------  IN: 0 mL / OUT: 100 mL / NET: -100 mL        PHYSICAL EXAM:      TUBES/LINES:  [] Wen  [] Lumbar Drain  [] Wound Drains  [] Others      DIET:  [] NPO  [] Mechanical  [] Tube feeds    LABS:                        7.7    5.61  )-----------( 123      ( 29 Jul 2021 12:38 )             25.6     07-29    141  |  108  |  19  ----------------------------<  93  4.4   |  27  |  1.26    Ca    8.3<L>      29 Jul 2021 08:02  Phos  2.3     07-29  Mg     2.2     07-29              CAPILLARY BLOOD GLUCOSE      POCT Blood Glucose.: 99 mg/dL (29 Jul 2021 07:15)      Drug Levels: [] N/A    CSF Analysis: [] N/A      Allergies    No Known Allergies    Intolerances      MEDICATIONS:  Antibiotics:    Neuro:  acetaminophen   Tablet .. 1000 milliGRAM(s) Oral every 8 hours  escitalopram 20 milliGRAM(s) Oral daily  ketorolac   Injectable 30 milliGRAM(s) IV Push every 8 hours  methocarbamol 750 milliGRAM(s) Oral every 8 hours  ondansetron    Tablet 4 milliGRAM(s) Oral every 6 hours  oxyCODONE    IR 5 milliGRAM(s) Oral every 4 hours PRN  oxyCODONE    IR 10 milliGRAM(s) Oral every 4 hours PRN  pregabalin 75 milliGRAM(s) Oral every 8 hours    Anticoagulation:  enoxaparin Injectable 40 milliGRAM(s) SubCutaneous at bedtime    OTHER:  bisacodyl 5 milliGRAM(s) Oral at bedtime  BUpivacaine liposome 1.3% Injectable (no eMAR) 20 milliLiter(s) Local Injection once  naloxone Injectable 0.1 milliGRAM(s) IV Push every 3 minutes PRN  polyethylene glycol 3350 17 Gram(s) Oral daily  senna 2 Tablet(s) Oral at bedtime    IVF:    CULTURES:    RADIOLOGY & ADDITIONAL TESTS:      ASSESSMENT:  56y Male s/p    M54.16    M54.16/M47.817/S31.184WZ61.26    Handoff    MEWS Score    Dyslipidemia    Prostate cancer    COVID-19    Back pain    Anxiety    Nephrolithiasis    Moderate obesity    Spondylolisthesis at L4-L5 level    Spondylolisthesis at L4-L5 level    Back pain    Anemia due to acute blood loss    Thrombocytopenia    Insertion of Wen catheter    Lumbar spinal fusion    No significant past surgical history    H/O prostatectomy    SysAdmin_VstLnk        PLAN:  NEURO:    CARDIOVASCULAR:    PULMONARY:    RENAL:    GI:    HEME:    ID:    ENDO:    DVT PROPHYLAXIS:  [] Venodynes                                [] Heparin/Lovenox    DISPOSITION:    Assessment:  Present when checked    []  GCS  E   V  M     Heart Failure: []Acute, [] acute on chronic , []chronic  Heart Failure:  [] Diastolic (HFpEF), [] Systolic (HFrEF), []Combined (HFpEF and HFrEF), [] RHF, [] Pulm HTN, [] Other    [] YVETTE, [] ATN, [] AIN, [] other  [] CKD1, [] CKD2, [] CKD 3, [] CKD 4, [] CKD 5, []ESRD    Encephalopathy: [] Metabolic, [] Hepatic, [] toxic, [] Neurological, [] Other    Abnormal Nurtitional Status: [] malnurtition (see nutrition note), [ ]underweight: BMI < 19, [] morbid obesity: BMI >40, [] Cachexia    [] Sepsis  [] hypovolemic shock,[] cardiogenic shock, [] hemorrhagic shock, [] neuogenic shock  [] Acute Respiratory Failure  []Cerebral edema, [] Brain compression/ herniation,   [] Functional quadriplegia  [] Acute blood loss anemia

## 2021-07-29 NOTE — DIETITIAN INITIAL EVALUATION ADULT. - OTHER CALCULATIONS
Based on Standards of Care pt >% IBW thus ideal body weight used for all calculations. Needs adjusted for post operative healing.

## 2021-07-29 NOTE — DIETITIAN INITIAL EVALUATION ADULT. - PERTINENT LABORATORY DATA
Sodium, Serum: 141 mmol/L  Potassium, Serum: 4.4 mmol/L  Chloride, Serum: 108 mmol/L  BUN, Serum: 19 mg/dL  Creatinine, Serum: 1.26 mg/dL  Glucose, Serum: 93 mg/dL  Calcium, Serum: 8.3 mg/dL (Low)  Magnesium, Serum: 2.2 mg/dL  Phosphorus, Serum:  2.3 mg/dL (Low)

## 2021-07-29 NOTE — PROGRESS NOTE ADULT - SUBJECTIVE AND OBJECTIVE BOX
Patient is a 56y old  Male who presents with a chief complaint of lumbar stenosis (27 Jul 2021 12:49)      HPI:  The patient is a 56-year-old man who is in back pain for several years it is worsened recently and he has had shooting pain going down his right leg. He has tried physical therapy and epidural steroid injections as well as medications with no relief     He states the symptoms are worsening. The patient mentions the symptoms started 2 year(s) ago. His symptoms occur while walking, sitting, standing and lifting.       Modifying factors - worsened by bending, worsened by prolonged standing and worsened by walking. Relieving factors include relieved by recumbency and relieved by rest, but not relieved by exercise regimen, not relieved by nonsteroidal anti-inflammatory drugs and not relieved by physical therapy.    (26 Jul 2021 06:36)    Subjective:  Pt has no acute complaints. Feels that pain is well controlled    Allergies    No Known Allergies    Intolerances    Home meds: Lexapro    MEDICATIONS  (STANDING):  acetaminophen   Tablet .. 1000 milliGRAM(s) Oral every 8 hours  bisacodyl 5 milliGRAM(s) Oral at bedtime  BUpivacaine liposome 1.3% Injectable (no eMAR) 20 milliLiter(s) Local Injection once  enoxaparin Injectable 40 milliGRAM(s) SubCutaneous at bedtime  escitalopram 20 milliGRAM(s) Oral daily  famotidine    Tablet 20 milliGRAM(s) Oral once  ketorolac   Injectable 30 milliGRAM(s) IV Push every 8 hours  methocarbamol 750 milliGRAM(s) Oral every 8 hours  ondansetron    Tablet 4 milliGRAM(s) Oral every 6 hours  polyethylene glycol 3350 17 Gram(s) Oral daily  pregabalin 75 milliGRAM(s) Oral every 8 hours  senna 2 Tablet(s) Oral at bedtime    MEDICATIONS  (PRN):  naloxone Injectable 0.1 milliGRAM(s) IV Push every 3 minutes PRN For ANY of the following changes in patient status:  A. RR LESS THAN 10 breaths per minute, B. Oxygen saturation LESS THAN 90%, C. Sedation score of 6  oxyCODONE    IR 5 milliGRAM(s) Oral every 4 hours PRN Moderate Pain (4 - 6)  oxyCODONE    IR 10 milliGRAM(s) Oral every 4 hours PRN Severe Pain (7 - 10)        Drug Dosing Weight  Height (cm): 175.3 (26 Jul 2021 06:29)  Weight (kg): 98.4 (26 Jul 2021 06:29)  BMI (kg/m2): 32 (26 Jul 2021 06:29)  BSA (m2): 2.14 (26 Jul 2021 06:29)    PAST MEDICAL & SURGICAL HISTORY:  Dyslipidemia    Prostate cancer    COVID-19  march 2020    Back pain    Anxiety    Nephrolithiasis    Moderate obesity    H/O prostatectomy  april 2021        FAMILY HISTORY:  no cardiac disease    SOCIAL HISTORY:  no smoking  ADVANCE DIRECTIVES:    Vital Signs Last 24 Hrs  T(C): 36.7 (29 Jul 2021 17:33), Max: 36.9 (28 Jul 2021 20:45)  T(F): 98.1 (29 Jul 2021 17:33), Max: 98.4 (28 Jul 2021 20:45)  HR: 55 (29 Jul 2021 17:33) (55 - 69)  BP: 105/57 (29 Jul 2021 17:33) (83/56 - 130/72)  BP(mean): --  RR: 19 (29 Jul 2021 17:33) (17 - 19)  SpO2: 93% (29 Jul 2021 17:33) (92% - 95%)                      PHYSICAL EXAM:      Constitutional: NAD  Eyes: PERRLA  ENMT: MMM  Neck: supple  Back: midline  Respiratory: CTA b/l  Cardiovascular: rrr, s1s2, no m/r/g  Gastrointestinal: soft, NTND, + BS  Extremities: wwp  Vascular: + 2 pulses radial  Neurological: AAO x 4  Skin: no rash  Lymph Nodes: no LAD  Musculoskeletal: no joint swelling  Psychiatric: normal affect        LABS:                          7.7    5.61  )-----------( 123      ( 29 Jul 2021 12:38 )             25.6   07-29    141  |  108  |  19  ----------------------------<  93  4.4   |  27  |  1.26    Ca    8.3<L>      29 Jul 2021 08:02  Phos  2.3     07-29  Mg     2.2     07-29          POCT Blood Glucose.: 107 mg/dL (26 Jul 2021 08:49)          EKG:    ECHO, US:        RADIOLOGY:  < from: CT Lumbar Spine No Cont (07.17.21 @ 07:56) >  IMPRESSION:    CT lumbar spine for surgical planning.    1.  Multilevel degenerative changes, better appreciated on the MRI dated 6/14/2021 and notable for:    2.  L3-4 moderate foraminal narrowing.    3.  L4-5 grade I-II anterolisthesis with severe spinal stenosis and moderate/severe bilateral foraminal narrowing. Prominent facet arthropathy.    4.  L5-S1 severe bilateral foraminal narrowing.    5.  Schmorl's node along the L4 inferior endplate with surrounding degenerative changes.    < end of copied text >

## 2021-07-29 NOTE — DIETITIAN INITIAL EVALUATION ADULT. - OTHER INFO
56yMale pmhx: nephrolithiasis, prostate cancer, anxiety, back pain, dyslipidemia, chronic LBP with radiculopathy now s/p L4-S1 TLIF, L3-pelvis (S2AI) fusion w/ plastics closure (7/26).    Pt seen at bedside for initial assessment. Denies nausea/vomiting at this time. Reports last bowel movement 7/25; currently receiving miralax, dulcolax, senna. Confirms NKFA. Denies change in appetite PTA; endorses 3 meals/day at home. Reports usual body weight 216 pounds (consistent with dosing weight 216 pounds). Verbalizes no recent weight loss. No edema documented at this time. No pressure ulcers documented at this time; back surgical incision per chart. Oscar score=20. Observed pt with no overt signs of muscle or fat wasting. Based on ASPEN guidelines, pt does not meet criteria for malnutrition at this time. Discussed current diet order: Regular Diet; provided pt with diet education regarding importance of meeting nutritional needs postoperatively; amenable to education. Pt reports feeling hungry during hospital stay, able to complete >75% of meals (completed croissant for breakfast, cottage cheese with fruit and salad and shrimp for dinner 7/28. Made aware RD remains available. RD to follow up per protocol. See nutrition recommendations below. 56yMale pmhx: nephrolithiasis, prostate cancer, anxiety, back pain, dyslipidemia, chronic LBP with radiculopathy now s/p L4-S1 TLIF, L3-pelvis (S2AI) fusion w/ plastics closure (7/26).    Pt seen at bedside for initial assessment. Denies nausea/vomiting at this time. Reports last bowel movement 7/25; currently receiving miralax, dulcolax, senna (states he takes metamucil at home). Confirms NKFA. Denies change in appetite PTA; endorses 3 meals/day at home. Reports usual body weight 216 pounds (consistent with dosing weight 216 pounds). Verbalizes no recent weight loss. No edema documented at this time. No pressure ulcers documented at this time; back surgical incision per chart. Oscar score=20. Observed pt with no overt signs of muscle or fat wasting. Based on ASPEN guidelines, pt does not meet criteria for malnutrition at this time. Discussed current diet order: Regular Diet; provided pt with diet education regarding importance of meeting nutritional needs postoperatively; amenable to education. Pt reports feeling hungry during hospital stay, able to complete >75% of meals (completed croissant for breakfast, cottage cheese with fruit and salad and shrimp for dinner 7/28. Made aware RD remains available. RD to follow up per protocol. See nutrition recommendations below.

## 2021-07-29 NOTE — DIETITIAN INITIAL EVALUATION ADULT. - IDEAL BODY WEIGHT (KG)
Problem: Pain  Goal: #Acceptable pain/comfort level is achieved/maintained at rest (based on self report using Numeric Rating Scales/Faces  Outcome: Outcome Met, Continue evaluating goal progress toward completion  Patient states pain control better. Pt on Hydromorphone and roxicodone prn      
Problem: Pain  Goal: #Acceptable pain/comfort level is achieved/maintained at rest (based on self report using Numeric Rating Scales/Faces  Outcome: Outcome Met, Continue evaluating goal progress toward completion  Pt reports generalized pain. Pt reports that sleeping and inactivity helps with pain. Pt's reports pain goal is a 2 and with IV pain meds he is able to get to goal. Pt is being rounded on every hour.      
Problem: Pain  Goal: #Acceptable pain/comfort level is achieved/maintained at rest (based on self report using Numeric Rating Scales/Faces  Outcome: Outcome Met, Continue evaluating goal progress toward completion  Writer utilizing numeric scale for self reporting pain level q4hr, and more frequently per patient condition. Will provide PRN analgesic as needed. Additional comfort interventions added through positional changes and distraction (television use) Will continue to monitor.     Problem: VTE, Risk for  Intervention: # Implement/maintain early mobilization  Early mobilization is recommended including HOB up or dangle for 20 minutes during the first 24 hours; Progress activity 2-4 times/day each subsequent day, unless contraindicated.  No signs or symptoms of VTE at this time, will continue to monitor. Will continue to promote range of motion activities with all extremities        
Problem: Pain  Goal: #Acceptable pain/comfort level is achieved/maintained at rest (based on self report using Numeric Rating Scales/Faces  Outcome: Outcome Not Met, Plan Adjusted  Methadone added per Dr. Martin today.      
72.5

## 2021-07-29 NOTE — PROGRESS NOTE ADULT - SUBJECTIVE AND OBJECTIVE BOX
NEUROSURGERY PAIN MANAGEMENT NOTE    Chief Complaint: back pain radiating to RLE    HPI:  The patient is a 56-year-old man who is in back pain for several years it is worsened recently and he has had shooting pain going down his right leg. He has tried physical therapy and epidural steroid injections as well as medications with no relief     He states the symptoms are worsening. The patient mentions the symptoms started 2 year(s) ago. His symptoms occur while walking, sitting, standing and lifting.       Modifying factors - worsened by bending, worsened by prolonged standing and worsened by walking. Relieving factors include relieved by recumbency and relieved by rest, but not relieved by exercise regimen, not relieved by nonsteroidal anti-inflammatory drugs and not relieved by physical therapy.    (26 Jul 2021 06:36)      PAST MEDICAL & SURGICAL HISTORY:  Dyslipidemia    Prostate cancer    COVID-19  march 2020    Back pain    Anxiety    Nephrolithiasis    Moderate obesity    H/O prostatectomy  april 2021        FAMILY HISTORY:      SOCIAL HISTORY:  [ ] Denies Smoking, Alcohol, or Drug Use    HOME MEDICATIONS:   Please refer to initial HNP    PAIN HOME MEDICATIONS:    Allergies    No Known Allergies    Intolerances        PAIN MEDICATIONS:  acetaminophen   Tablet .. 1000 milliGRAM(s) Oral every 8 hours  escitalopram 20 milliGRAM(s) Oral daily  escitalopram 20 milliGRAM(s) Oral daily  ketorolac   Injectable 30 milliGRAM(s) IV Push every 8 hours  methocarbamol 750 milliGRAM(s) Oral every 8 hours  ondansetron    Tablet 4 milliGRAM(s) Oral every 6 hours  oxyCODONE    IR 5 milliGRAM(s) Oral every 4 hours PRN  oxyCODONE    IR 10 milliGRAM(s) Oral every 4 hours PRN  pregabalin 50 milliGRAM(s) Oral three times a day    Heme:  enoxaparin Injectable 40 milliGRAM(s) SubCutaneous at bedtime    Antibiotics:    Cardiovascular:    GI:  bisacodyl 5 milliGRAM(s) Oral at bedtime  polyethylene glycol 3350 17 Gram(s) Oral daily  senna 2 Tablet(s) Oral at bedtime    Endocrine:    All Other Medications:  BUpivacaine liposome 1.3% Injectable (no eMAR) 20 milliLiter(s) Local Injection once  naloxone Injectable 0.1 milliGRAM(s) IV Push every 3 minutes PRN      Vital Signs Last 24 Hrs  T(C): 36.3 (29 Jul 2021 05:04), Max: 37.1 (28 Jul 2021 14:16)  T(F): 97.4 (29 Jul 2021 05:04), Max: 98.8 (28 Jul 2021 14:16)  HR: 60 (29 Jul 2021 05:04) (57 - 67)  BP: 130/72 (29 Jul 2021 05:04) (82/47 - 130/72)  BP(mean): --  RR: 17 (29 Jul 2021 05:04) (17 - 20)  SpO2: 92% (29 Jul 2021 05:04) (90% - 97%)    LABS:                        9.3    7.49  )-----------( 129      ( 28 Jul 2021 12:28 )             30.8     07-28    141  |  107  |  20  ----------------------------<  122<H>  4.1   |  26  |  1.29    Ca    8.5      28 Jul 2021 08:49  Phos  2.3     07-28  Mg     1.8     07-28            RADIOLOGY:    Drug Screen:        REVIEW OF SYSTEMS:  CONSTITUTIONAL: No fever or fatigue O/N.   EYES: No eye pain, visual disturbances  ENMT:  No difficulty hearing. No throat pain  NECK: No pain or stiffness  RESPIRATORY: No cough, wheezing; No shortness of breath  CARDIOVASCULAR: No chest pain, palpitations.   GASTROINTESTINAL: Pt reports passing gas. No bowel movements. No abdominal or epigastric pain. No nausea, vomiting. GENITOURINARY: No dysuria, frequency, or incontinence  NEUROLOGICAL: No headaches, loss of strength, numbness, or tremors. No dizzinesss or lightheadedness with pain medications.   MUSCULOSKELETAL: Incisional back pain. No joint pain or swelling; No muscle, or extremity pain      PAIN ASSESSMENT:    PHYSICAL EXAM  GENERAL: Laying in bed, NAD  Neuro: CN II-XII PERRL, EOMI  Cranial nerves grossly intact  Motor exam: MAEx4   Sensation intact to LT in UE/LE in 3 dermatomes  CHEST/LUNG: Clear to auscultation bilaterally; No rales, rhonchi, wheezing, or rubs  HEART: Regular rate and rhythm; No murmurs, rubs, or gallops  ABDOMEN: Soft, Nontender, Nondistended; Bowel sounds present  EXTREMITIES:  2+ Peripheral Pulses, No clubbing, cyanosis, or edema  SKIN: No rashes or lesions      ASSESSMENT:   56yMale with chronic LBP with radiculopathy now s/p L4-S1 TLIF, L3-pelvis (S2AI) fusion w/ plastics closure (7/26).      PLAN:   - Pain:   Tylenol 1000mg every 8 hours    Lyrica 50mg every 8 hours    Robaxin to 750mg every 8 hours    Oxycodone 5/10mg every 4 hours PRN mod/severe pain    Start Toradol 30mg IV every 8 hours x3 days    - Home pain regiment:    - Bowel regimen: Senna    - Nausea ppx: Zofran standing  - Functional Goals: Pt will get OOB with PT today. Pt will resume previous level of activity without impairment from surgery.   - Additional Consults: None recommended.   - Additional Labs/Imaging:  None recommended.   - Follow up, Discharge Planning: home when ready  - Pain Management follow up plan: will cont to follow    d/w Dr. Rolon NEUROSURGERY PAIN MANAGEMENT NOTE    Chief Complaint: back pain radiating to RLE    HPI:  The patient is a 56-year-old man who is in back pain for several years it is worsened recently and he has had shooting pain going down his right leg. He has tried physical therapy and epidural steroid injections as well as medications with no relief     He states the symptoms are worsening. The patient mentions the symptoms started 2 year(s) ago. His symptoms occur while walking, sitting, standing and lifting.       Modifying factors - worsened by bending, worsened by prolonged standing and worsened by walking. Relieving factors include relieved by recumbency and relieved by rest, but not relieved by exercise regimen, not relieved by nonsteroidal anti-inflammatory drugs and not relieved by physical therapy.    (26 Jul 2021 06:36)      PAST MEDICAL & SURGICAL HISTORY:  Dyslipidemia    Prostate cancer    COVID-19  march 2020    Back pain    Anxiety    Nephrolithiasis    Moderate obesity    H/O prostatectomy  april 2021        FAMILY HISTORY:      SOCIAL HISTORY:  [ ] Denies Smoking, Alcohol, or Drug Use    HOME MEDICATIONS:   Please refer to initial HNP    PAIN HOME MEDICATIONS:    Allergies    No Known Allergies    Intolerances        PAIN MEDICATIONS:  acetaminophen   Tablet .. 1000 milliGRAM(s) Oral every 8 hours  escitalopram 20 milliGRAM(s) Oral daily  escitalopram 20 milliGRAM(s) Oral daily  ketorolac   Injectable 30 milliGRAM(s) IV Push every 8 hours  methocarbamol 750 milliGRAM(s) Oral every 8 hours  ondansetron    Tablet 4 milliGRAM(s) Oral every 6 hours  oxyCODONE    IR 5 milliGRAM(s) Oral every 4 hours PRN  oxyCODONE    IR 10 milliGRAM(s) Oral every 4 hours PRN  pregabalin 50 milliGRAM(s) Oral three times a day    Heme:  enoxaparin Injectable 40 milliGRAM(s) SubCutaneous at bedtime    Antibiotics:    Cardiovascular:    GI:  bisacodyl 5 milliGRAM(s) Oral at bedtime  polyethylene glycol 3350 17 Gram(s) Oral daily  senna 2 Tablet(s) Oral at bedtime    Endocrine:    All Other Medications:  BUpivacaine liposome 1.3% Injectable (no eMAR) 20 milliLiter(s) Local Injection once  naloxone Injectable 0.1 milliGRAM(s) IV Push every 3 minutes PRN      Vital Signs Last 24 Hrs  T(C): 36.3 (29 Jul 2021 05:04), Max: 37.1 (28 Jul 2021 14:16)  T(F): 97.4 (29 Jul 2021 05:04), Max: 98.8 (28 Jul 2021 14:16)  HR: 60 (29 Jul 2021 05:04) (57 - 67)  BP: 130/72 (29 Jul 2021 05:04) (82/47 - 130/72)  BP(mean): --  RR: 17 (29 Jul 2021 05:04) (17 - 20)  SpO2: 92% (29 Jul 2021 05:04) (90% - 97%)    LABS:                        9.3    7.49  )-----------( 129      ( 28 Jul 2021 12:28 )             30.8     07-28    141  |  107  |  20  ----------------------------<  122<H>  4.1   |  26  |  1.29    Ca    8.5      28 Jul 2021 08:49  Phos  2.3     07-28  Mg     1.8     07-28            RADIOLOGY:    Drug Screen:        REVIEW OF SYSTEMS:  CONSTITUTIONAL: No fever or fatigue O/N.   EYES: No eye pain, visual disturbances  ENMT:  No difficulty hearing. No throat pain  NECK: No pain or stiffness  RESPIRATORY: No cough, wheezing; No shortness of breath  CARDIOVASCULAR: No chest pain, palpitations.   GASTROINTESTINAL: Pt reports passing gas. No bowel movements. No abdominal or epigastric pain. No nausea, vomiting. GENITOURINARY: No dysuria, frequency, or incontinence  NEUROLOGICAL: No headaches, loss of strength, numbness, or tremors. No dizzinesss or lightheadedness with pain medications.   MUSCULOSKELETAL: Incisional back pain. No joint pain or swelling; No muscle, or extremity pain      PAIN ASSESSMENT: c/o incisional pain, stiffness and burning pain    PHYSICAL EXAM  GENERAL: Laying in bed, NAD  Neuro: CN II-XII PERRL, EOMI  Cranial nerves grossly intact  Motor exam: MAEx4   Sensation intact to LT in UE/LE in 3 dermatomes  CHEST/LUNG: Clear to auscultation bilaterally; No rales, rhonchi, wheezing, or rubs  HEART: Regular rate and rhythm; No murmurs, rubs, or gallops  ABDOMEN: Soft, Nontender, Nondistended; Bowel sounds present  EXTREMITIES:  2+ Peripheral Pulses, No clubbing, cyanosis, or edema  SKIN: No rashes or lesions      ASSESSMENT:   56yMale with chronic LBP with radiculopathy now s/p L4-S1 TLIF, L3-pelvis (S2AI) fusion w/ plastics closure (7/26).      PLAN:   - Pain:    Tylenol 1000mg every 8 hours    Increase Lyrica 75mg every 8 hours    Robaxin to 750mg every 8 hours    Oxycodone 5/10mg every 4 hours PRN mod/severe pain    Toradol 30mg IV every 8 hours x3 days    - Bowel regimen: Senna    - Nausea ppx: Zofran standing  - Functional Goals: Pt will get OOB with PT today. Pt will resume previous level of activity without impairment from surgery.   - Additional Consults: None recommended.   - Additional Labs/Imaging:  None recommended.   - Follow up, Discharge Planning: home when ready  - Pain Management follow up plan: will cont to follow    d/w Dr. Rolon

## 2021-07-29 NOTE — DIETITIAN INITIAL EVALUATION ADULT. - ADD RECOMMEND
1. Continue with current diet order 2. Encourage pt to meet nutritional needs as able 3. Encourage adherence to diet education 1. Continue with current diet order 2. Encourage pt to meet nutritional needs as able 3. Encourage adherence to diet education 4. Monitor bowel movement; advance regimen as needed

## 2021-07-30 LAB
ANION GAP SERPL CALC-SCNC: 5 MMOL/L — SIGNIFICANT CHANGE UP (ref 5–17)
BUN SERPL-MCNC: 16 MG/DL — SIGNIFICANT CHANGE UP (ref 7–23)
CALCIUM SERPL-MCNC: 8.4 MG/DL — SIGNIFICANT CHANGE UP (ref 8.4–10.5)
CHLORIDE SERPL-SCNC: 108 MMOL/L — SIGNIFICANT CHANGE UP (ref 96–108)
CO2 SERPL-SCNC: 26 MMOL/L — SIGNIFICANT CHANGE UP (ref 22–31)
CREAT SERPL-MCNC: 1.14 MG/DL — SIGNIFICANT CHANGE UP (ref 0.5–1.3)
GLUCOSE SERPL-MCNC: 95 MG/DL — SIGNIFICANT CHANGE UP (ref 70–99)
HCT VFR BLD CALC: 25.1 % — LOW (ref 39–50)
HCT VFR BLD CALC: 26 % — LOW (ref 39–50)
HGB BLD-MCNC: 7.7 G/DL — LOW (ref 13–17)
HGB BLD-MCNC: 7.7 G/DL — LOW (ref 13–17)
MAGNESIUM SERPL-MCNC: 2.2 MG/DL — SIGNIFICANT CHANGE UP (ref 1.6–2.6)
MCHC RBC-ENTMCNC: 19.2 PG — LOW (ref 27–34)
MCHC RBC-ENTMCNC: 19.6 PG — LOW (ref 27–34)
MCHC RBC-ENTMCNC: 29.6 GM/DL — LOW (ref 32–36)
MCHC RBC-ENTMCNC: 30.7 GM/DL — LOW (ref 32–36)
MCV RBC AUTO: 63.9 FL — LOW (ref 80–100)
MCV RBC AUTO: 64.8 FL — LOW (ref 80–100)
NRBC # BLD: 0 /100 WBCS — SIGNIFICANT CHANGE UP (ref 0–0)
NRBC # BLD: 0 /100 WBCS — SIGNIFICANT CHANGE UP (ref 0–0)
PHOSPHATE SERPL-MCNC: 3 MG/DL — SIGNIFICANT CHANGE UP (ref 2.5–4.5)
PLATELET # BLD AUTO: 128 K/UL — LOW (ref 150–400)
PLATELET # BLD AUTO: 143 K/UL — LOW (ref 150–400)
POTASSIUM SERPL-MCNC: 4.5 MMOL/L — SIGNIFICANT CHANGE UP (ref 3.5–5.3)
POTASSIUM SERPL-SCNC: 4.5 MMOL/L — SIGNIFICANT CHANGE UP (ref 3.5–5.3)
RBC # BLD: 3.93 M/UL — LOW (ref 4.2–5.8)
RBC # BLD: 4.01 M/UL — LOW (ref 4.2–5.8)
RBC # FLD: 15.8 % — HIGH (ref 10.3–14.5)
RBC # FLD: 15.8 % — HIGH (ref 10.3–14.5)
SODIUM SERPL-SCNC: 139 MMOL/L — SIGNIFICANT CHANGE UP (ref 135–145)
WBC # BLD: 4.64 K/UL — SIGNIFICANT CHANGE UP (ref 3.8–10.5)
WBC # BLD: 4.96 K/UL — SIGNIFICANT CHANGE UP (ref 3.8–10.5)
WBC # FLD AUTO: 4.64 K/UL — SIGNIFICANT CHANGE UP (ref 3.8–10.5)
WBC # FLD AUTO: 4.96 K/UL — SIGNIFICANT CHANGE UP (ref 3.8–10.5)

## 2021-07-30 PROCEDURE — 99233 SBSQ HOSP IP/OBS HIGH 50: CPT | Mod: GC

## 2021-07-30 RX ORDER — LACTULOSE 10 G/15ML
10 SOLUTION ORAL ONCE
Refills: 0 | Status: COMPLETED | OUTPATIENT
Start: 2021-07-30 | End: 2021-07-30

## 2021-07-30 RX ADMIN — OXYCODONE HYDROCHLORIDE 5 MILLIGRAM(S): 5 TABLET ORAL at 22:17

## 2021-07-30 RX ADMIN — METHOCARBAMOL 750 MILLIGRAM(S): 500 TABLET, FILM COATED ORAL at 14:18

## 2021-07-30 RX ADMIN — Medication 1000 MILLIGRAM(S): at 15:18

## 2021-07-30 RX ADMIN — Medication 1000 MILLIGRAM(S): at 14:18

## 2021-07-30 RX ADMIN — METHOCARBAMOL 750 MILLIGRAM(S): 500 TABLET, FILM COATED ORAL at 22:17

## 2021-07-30 RX ADMIN — OXYCODONE HYDROCHLORIDE 5 MILLIGRAM(S): 5 TABLET ORAL at 23:00

## 2021-07-30 RX ADMIN — OXYCODONE HYDROCHLORIDE 5 MILLIGRAM(S): 5 TABLET ORAL at 16:46

## 2021-07-30 RX ADMIN — Medication 1000 MILLIGRAM(S): at 06:41

## 2021-07-30 RX ADMIN — Medication 1000 MILLIGRAM(S): at 06:03

## 2021-07-30 RX ADMIN — OXYCODONE HYDROCHLORIDE 5 MILLIGRAM(S): 5 TABLET ORAL at 06:08

## 2021-07-30 RX ADMIN — Medication 75 MILLIGRAM(S): at 06:02

## 2021-07-30 RX ADMIN — OXYCODONE HYDROCHLORIDE 5 MILLIGRAM(S): 5 TABLET ORAL at 06:41

## 2021-07-30 RX ADMIN — SENNA PLUS 2 TABLET(S): 8.6 TABLET ORAL at 22:17

## 2021-07-30 RX ADMIN — Medication 75 MILLIGRAM(S): at 22:17

## 2021-07-30 RX ADMIN — ENOXAPARIN SODIUM 40 MILLIGRAM(S): 100 INJECTION SUBCUTANEOUS at 22:18

## 2021-07-30 RX ADMIN — Medication 30 MILLIGRAM(S): at 06:20

## 2021-07-30 RX ADMIN — METHOCARBAMOL 750 MILLIGRAM(S): 500 TABLET, FILM COATED ORAL at 06:03

## 2021-07-30 RX ADMIN — POLYETHYLENE GLYCOL 3350 17 GRAM(S): 17 POWDER, FOR SOLUTION ORAL at 06:03

## 2021-07-30 RX ADMIN — Medication 30 MILLIGRAM(S): at 06:03

## 2021-07-30 RX ADMIN — Medication 1000 MILLIGRAM(S): at 22:16

## 2021-07-30 RX ADMIN — ESCITALOPRAM OXALATE 20 MILLIGRAM(S): 10 TABLET, FILM COATED ORAL at 06:03

## 2021-07-30 RX ADMIN — Medication 30 MILLIGRAM(S): at 22:17

## 2021-07-30 RX ADMIN — OXYCODONE HYDROCHLORIDE 5 MILLIGRAM(S): 5 TABLET ORAL at 17:46

## 2021-07-30 RX ADMIN — Medication 75 MILLIGRAM(S): at 14:18

## 2021-07-30 RX ADMIN — Medication 30 MILLIGRAM(S): at 14:18

## 2021-07-30 RX ADMIN — Medication 1000 MILLIGRAM(S): at 23:00

## 2021-07-30 RX ADMIN — Medication 30 MILLIGRAM(S): at 15:18

## 2021-07-30 NOTE — PROGRESS NOTE ADULT - SUBJECTIVE AND OBJECTIVE BOX
HPI:  The patient is a 56-year-old man who is in back pain for several years it is worsened recently and he has had shooting pain going down his right leg. He has tried physical therapy and epidural steroid injections as well as medications with no relief     He states the symptoms are worsening. The patient mentions the symptoms started 2 year(s) ago. His symptoms occur while walking, sitting, standing and lifting.     Modifying factors - worsened by bending, worsened by prolonged standing and worsened by walking. Relieving factors include relieved by recumbency and relieved by rest, but not relieved by exercise regimen, not relieved by nonsteroidal anti-inflammatory drugs and not relieved by physical therapy.    (26 Jul 2021 06:36)    Hospital Course:  7/26: POD0 L4-S1 TLIF, L3-pelvic fusion  7/27: POD1, Nick, hypotensive overnight s/p 500 cc bolus. STAT CBC o/n for high drain output in setting of low SBP and bradycardia, Hgb stable. neuro stable. F/u lopez removal. Standing xrays before discharge   7/28: POD 2. CAREY o/n.   7/29: POD 3.   7/30: POD 4. HMV and EDGAR remain. Hgb downtrending, monitor. F/U when to resume eliquis/ASA. Needs BM     Vital Signs Last 24 Hrs  T(C): 36.8 (30 Jul 2021 00:31), Max: 36.8 (30 Jul 2021 00:31)  T(F): 98.2 (30 Jul 2021 00:31), Max: 98.2 (30 Jul 2021 00:31)  HR: 50 (30 Jul 2021 00:31) (50 - 69)  BP: 111/65 (30 Jul 2021 00:31) (102/65 - 130/72)  BP(mean): --  RR: 17 (30 Jul 2021 00:31) (17 - 19)  SpO2: 95% (30 Jul 2021 00:31) (92% - 95%)    I&O's Detail    28 Jul 2021 07:01  -  29 Jul 2021 07:00  --------------------------------------------------------  IN:    IV PiggyBack: 100 mL    Oral Fluid: 1665 mL    Sodium Chloride 0.9% Bolus: 1000 mL  Total IN: 2765 mL    OUT:    Accordian (mL): 225 mL    Bulb (mL): 75 mL    Voided (mL): 200 mL  Total OUT: 500 mL    Total NET: 2265 mL      29 Jul 2021 07:01  -  30 Jul 2021 03:31  --------------------------------------------------------  IN:  Total IN: 0 mL    OUT:    Accordian (mL): 175 mL    Bulb (mL): 45 mL  Total OUT: 220 mL    Total NET: -220 mL        I&O's Summary    28 Jul 2021 07:01  -  29 Jul 2021 07:00  --------------------------------------------------------  IN: 2765 mL / OUT: 500 mL / NET: 2265 mL    29 Jul 2021 07:01  -  30 Jul 2021 03:31  --------------------------------------------------------  IN: 0 mL / OUT: 220 mL / NET: -220 mL    PHYSICAL EXAM:  General: patient seen laying supine in bed in NAD  Neuro: AAOx3, FC, OE spontaneously, speech clear and fluent,  face symmetric, strength UEs 5/5, b/l LEs HF/KF/KE 3/5 DF/PF 5/5 greatly pain limited, sensation intact to light touch throughout  HEENT: PERRL, EOMI  Neck: supple  Cardiac: RRR, S1S2  Pulmonary: chest rise symmetric  Abdomen: soft, nontender, nondistended  Ext: warm, perfusing well  Wound: Back incision dressing c/d/i, HMV x 1, JPx1    TUBES/LINES:  [] Lopez  [] Lumbar Drain  [x] Wound Drains  [] Others    DIET:  [] NPO  [x] Mechanical  [] Tube feeds    LABS:                        7.7    5.61  )-----------( 123      ( 29 Jul 2021 12:38 )             25.6     07-29    141  |  108  |  19  ----------------------------<  93  4.4   |  27  |  1.26    Ca    8.3<L>      29 Jul 2021 08:02  Phos  2.3     07-29  Mg     2.2     07-29              CAPILLARY BLOOD GLUCOSE      POCT Blood Glucose.: 99 mg/dL (29 Jul 2021 07:15)      Drug Levels: [] N/A    CSF Analysis: [] N/A      Allergies    No Known Allergies    Intolerances      MEDICATIONS:  Antibiotics:    Neuro:  acetaminophen   Tablet .. 1000 milliGRAM(s) Oral every 8 hours  escitalopram 20 milliGRAM(s) Oral daily  ketorolac   Injectable 30 milliGRAM(s) IV Push every 8 hours  methocarbamol 750 milliGRAM(s) Oral every 8 hours  ondansetron    Tablet 4 milliGRAM(s) Oral every 6 hours  oxyCODONE    IR 5 milliGRAM(s) Oral every 4 hours PRN  oxyCODONE    IR 10 milliGRAM(s) Oral every 4 hours PRN  pregabalin 75 milliGRAM(s) Oral every 8 hours    Anticoagulation:  enoxaparin Injectable 40 milliGRAM(s) SubCutaneous at bedtime    OTHER:  bisacodyl 5 milliGRAM(s) Oral at bedtime  BUpivacaine liposome 1.3% Injectable (no eMAR) 20 milliLiter(s) Local Injection once  naloxone Injectable 0.1 milliGRAM(s) IV Push every 3 minutes PRN  polyethylene glycol 3350 17 Gram(s) Oral daily  senna 2 Tablet(s) Oral at bedtime    IVF:    CULTURES:    RADIOLOGY & ADDITIONAL TESTS:    ASSESSMENT:  57 y/o Male with Obesity, HLD, h/o Prostate cancer, Anxiety, chronic LBP with radiculopathy now s/p L4-S1 TLIF, L3-pelvis (S2AI) fusion w/ plastics closure (7/26).    PLAN:  Neuro:  - neuro/vital [id number]  - pain control, transitioned to orals  - monitor HMV/EDGAR outputs, plastics closure  - standing xrays before d/c    Cardiac:  - normotensive BP goal    Pulmonary:  - on RA    GI:  - Reg diet  - no BM; lactulose added to bowel reg    Renal:  - Trend lytes    Heme:  - Hgb 7.7, trend   - SCDs    Endo:  - no issues    ID:  - postop ancef  - afebrile monitor for leukocytosis    Dispo: Full code. Pending home w/no needs.    D/w Dr. Guillory

## 2021-07-30 NOTE — PROGRESS NOTE ADULT - SUBJECTIVE AND OBJECTIVE BOX
NEUROSURGERY PAIN MANAGEMENT NOTE    Chief Complaint: back pain radiating to RLE    HPI:  The patient is a 56-year-old man who is in back pain for several years it is worsened recently and he has had shooting pain going down his right leg. He has tried physical therapy and epidural steroid injections as well as medications with no relief     He states the symptoms are worsening. The patient mentions the symptoms started 2 year(s) ago. His symptoms occur while walking, sitting, standing and lifting.       Modifying factors - worsened by bending, worsened by prolonged standing and worsened by walking. Relieving factors include relieved by recumbency and relieved by rest, but not relieved by exercise regimen, not relieved by nonsteroidal anti-inflammatory drugs and not relieved by physical therapy.    (26 Jul 2021 06:36)      PAST MEDICAL & SURGICAL HISTORY:  Dyslipidemia    Prostate cancer    COVID-19  march 2020    Back pain    Anxiety    Nephrolithiasis    Moderate obesity    H/O prostatectomy  april 2021        FAMILY HISTORY:      SOCIAL HISTORY:  [ ] Denies Smoking, Alcohol, or Drug Use    HOME MEDICATIONS:   Please refer to initial HNP    PAIN HOME MEDICATIONS:    Allergies    No Known Allergies    Intolerances        PAIN MEDICATIONS:  acetaminophen   Tablet .. 1000 milliGRAM(s) Oral every 8 hours  escitalopram 20 milliGRAM(s) Oral daily  ketorolac   Injectable 30 milliGRAM(s) IV Push every 8 hours  methocarbamol 750 milliGRAM(s) Oral every 8 hours  ondansetron    Tablet 4 milliGRAM(s) Oral every 6 hours  oxyCODONE    IR 5 milliGRAM(s) Oral every 4 hours PRN  oxyCODONE    IR 10 milliGRAM(s) Oral every 4 hours PRN  pregabalin 75 milliGRAM(s) Oral every 8 hours    Heme:  enoxaparin Injectable 40 milliGRAM(s) SubCutaneous at bedtime    Antibiotics:    Cardiovascular:    GI:  bisacodyl 5 milliGRAM(s) Oral at bedtime  polyethylene glycol 3350 17 Gram(s) Oral daily  senna 2 Tablet(s) Oral at bedtime    Endocrine:    All Other Medications:  BUpivacaine liposome 1.3% Injectable (no eMAR) 20 milliLiter(s) Local Injection once  naloxone Injectable 0.1 milliGRAM(s) IV Push every 3 minutes PRN      Vital Signs Last 24 Hrs  T(C): 36.3 (30 Jul 2021 06:00), Max: 36.8 (30 Jul 2021 00:31)  T(F): 97.4 (30 Jul 2021 06:00), Max: 98.2 (30 Jul 2021 00:31)  HR: 56 (30 Jul 2021 06:00) (50 - 69)  BP: 116/78 (30 Jul 2021 06:00) (102/65 - 128/70)  BP(mean): --  RR: 18 (30 Jul 2021 06:00) (17 - 19)  SpO2: 98% (30 Jul 2021 06:00) (93% - 98%)    LABS:                        7.7    4.64  )-----------( 128      ( 30 Jul 2021 06:30 )             26.0     07-30    139  |  108  |  16  ----------------------------<  95  4.5   |  26  |  1.14    Ca    8.4      30 Jul 2021 06:30  Phos  3.0     07-30  Mg     2.2     07-30            RADIOLOGY:    Drug Screen:        REVIEW OF SYSTEMS:  CONSTITUTIONAL: No fever or fatigue O/N.   EYES: No eye pain, visual disturbances  ENMT:  No difficulty hearing. No throat pain  NECK: No pain or stiffness  RESPIRATORY: No cough, wheezing; No shortness of breath  CARDIOVASCULAR: No chest pain, palpitations.   GASTROINTESTINAL: Pt reports passing gas. No bowel movements. No abdominal or epigastric pain. No nausea, vomiting. GENITOURINARY: No dysuria, frequency, or incontinence  NEUROLOGICAL: No headaches, loss of strength, numbness, or tremors. No dizzinesss or lightheadedness with pain medications.   MUSCULOSKELETAL: Incisional back pain. No joint pain or swelling; No muscle, or extremity pain    PAIN ASSESSMENT:    PHYSICAL EXAM  GENERAL: Laying in bed, NAD  Neuro: CN II-XII PERRL, EOMI  Cranial nerves grossly intact  Motor exam:   Sensation intact to LT in UE/LE in 3 dermatomes  CHEST/LUNG: Clear to auscultation bilaterally; No rales, rhonchi, wheezing, or rubs  HEART: Regular rate and rhythm; No murmurs, rubs, or gallops  ABDOMEN: Soft, Nontender, Nondistended; Bowel sounds present  EXTREMITIES:  2+ Peripheral Pulses, No clubbing, cyanosis, or edema  SKIN: No rashes or lesions      ASSESSMENT:       PLAN:   - Pain:    Since yesterday 6am, pt has required:     PCA Setting:   - Opioids:   - Initial Bolus:   - Initial Demand:   - Lockout:   - Continuous Rate:   - 4 hour limit:     - Transition to . LA not required. Pt receiving adequete coverage. First step. For rescue dosing, will order .      - Home pain regiment:    - Bowel regimen: Senna    - Nausea ppx: Zofran standing  - Functional Goals: Pt will get OOB with PT today. Pt will resume previous level of activity without impairment from surgery.   - Additional Consults: None recommended.   - Additional Labs/Imaging:  None recommended.     - Follow up, Discharge Planning:     Patient is set for discharge to:     Discharge is pending:   - Pain Management follow up plan:    NEUROSURGERY PAIN MANAGEMENT NOTE    Chief Complaint: back pain radiating to RLE    HPI:  The patient is a 56-year-old man who is in back pain for several years it is worsened recently and he has had shooting pain going down his right leg. He has tried physical therapy and epidural steroid injections as well as medications with no relief     He states the symptoms are worsening. The patient mentions the symptoms started 2 year(s) ago. His symptoms occur while walking, sitting, standing and lifting.       Modifying factors - worsened by bending, worsened by prolonged standing and worsened by walking. Relieving factors include relieved by recumbency and relieved by rest, but not relieved by exercise regimen, not relieved by nonsteroidal anti-inflammatory drugs and not relieved by physical therapy.    (26 Jul 2021 06:36)      PAST MEDICAL & SURGICAL HISTORY:  Dyslipidemia    Prostate cancer    COVID-19  march 2020    Back pain    Anxiety    Nephrolithiasis    Moderate obesity    H/O prostatectomy  april 2021        FAMILY HISTORY:      SOCIAL HISTORY:  [ ] Denies Smoking, Alcohol, or Drug Use    HOME MEDICATIONS:   Please refer to initial HNP    PAIN HOME MEDICATIONS:    Allergies    No Known Allergies    Intolerances        PAIN MEDICATIONS:  acetaminophen   Tablet .. 1000 milliGRAM(s) Oral every 8 hours  escitalopram 20 milliGRAM(s) Oral daily  ketorolac   Injectable 30 milliGRAM(s) IV Push every 8 hours  methocarbamol 750 milliGRAM(s) Oral every 8 hours  ondansetron    Tablet 4 milliGRAM(s) Oral every 6 hours  oxyCODONE    IR 5 milliGRAM(s) Oral every 4 hours PRN  oxyCODONE    IR 10 milliGRAM(s) Oral every 4 hours PRN  pregabalin 75 milliGRAM(s) Oral every 8 hours    Heme:  enoxaparin Injectable 40 milliGRAM(s) SubCutaneous at bedtime    Antibiotics:    Cardiovascular:    GI:  bisacodyl 5 milliGRAM(s) Oral at bedtime  polyethylene glycol 3350 17 Gram(s) Oral daily  senna 2 Tablet(s) Oral at bedtime    Endocrine:    All Other Medications:  BUpivacaine liposome 1.3% Injectable (no eMAR) 20 milliLiter(s) Local Injection once  naloxone Injectable 0.1 milliGRAM(s) IV Push every 3 minutes PRN      Vital Signs Last 24 Hrs  T(C): 36.3 (30 Jul 2021 06:00), Max: 36.8 (30 Jul 2021 00:31)  T(F): 97.4 (30 Jul 2021 06:00), Max: 98.2 (30 Jul 2021 00:31)  HR: 56 (30 Jul 2021 06:00) (50 - 69)  BP: 116/78 (30 Jul 2021 06:00) (102/65 - 128/70)  BP(mean): --  RR: 18 (30 Jul 2021 06:00) (17 - 19)  SpO2: 98% (30 Jul 2021 06:00) (93% - 98%)    LABS:                        7.7    4.64  )-----------( 128      ( 30 Jul 2021 06:30 )             26.0     07-30    139  |  108  |  16  ----------------------------<  95  4.5   |  26  |  1.14    Ca    8.4      30 Jul 2021 06:30  Phos  3.0     07-30  Mg     2.2     07-30            RADIOLOGY:    Drug Screen:        REVIEW OF SYSTEMS:  CONSTITUTIONAL: No fever or fatigue O/N.   EYES: No eye pain, visual disturbances  ENMT:  No difficulty hearing. No throat pain  NECK: No pain or stiffness  RESPIRATORY: No cough, wheezing; No shortness of breath  CARDIOVASCULAR: No chest pain, palpitations.   GASTROINTESTINAL: Pt reports passing gas. No bowel movements. No abdominal or epigastric pain. No nausea, vomiting. GENITOURINARY: No dysuria, frequency, or incontinence  NEUROLOGICAL: No headaches, loss of strength, numbness, or tremors. No dizzinesss or lightheadedness with pain medications.   MUSCULOSKELETAL: Incisional back pain. No joint pain or swelling; No muscle, or extremity pain    PAIN ASSESSMENT: back pain and stiffness is better today    PHYSICAL EXAM  GENERAL: Laying in bed, NAD  Neuro: CN II-XII PERRL, EOMI  Cranial nerves grossly intact  Motor exam:   Sensation intact to LT in UE/LE in 3 dermatomes  CHEST/LUNG: Clear to auscultation bilaterally; No rales, rhonchi, wheezing, or rubs  HEART: Regular rate and rhythm; No murmurs, rubs, or gallops  ABDOMEN: Soft, Nontender, Nondistended; Bowel sounds present  EXTREMITIES:  2+ Peripheral Pulses, No clubbing, cyanosis, or edema  SKIN: No rashes or lesions      ASSESSMENT:   56yMale with chronic LBP with radiculopathy now s/p L4-S1 TLIF, L3-pelvis (S2AI) fusion w/ plastics closure (7/26).      PLAN:   - Pain:    Tylenol 1000mg every 8 hours    Lyrica 75mg every 8 hours    Robaxin to 750mg every 8 hours    Oxycodone 5/10mg every 4 hours PRN mod/severe pain    Toradol 30mg IV every 8 hours x3 days    - Bowel regimen: Senna    - Nausea ppx: Zofran standing  - Functional Goals: Pt will get OOB with PT today. Pt will resume previous level of activity without impairment from surgery.   - Additional Consults: None recommended.   - Additional Labs/Imaging:  None recommended.   - Follow up, Discharge Planning: home when medically ready  - Pain Management follow up plan: will cont to follow    d/w Dr. Rolon

## 2021-07-30 NOTE — PROGRESS NOTE ADULT - SUBJECTIVE AND OBJECTIVE BOX
Patient is a 56y old  Male who presents with a chief complaint of lumbar stenosis (27 Jul 2021 12:49)      HPI:  The patient is a 56-year-old man who is in back pain for several years it is worsened recently and he has had shooting pain going down his right leg. He has tried physical therapy and epidural steroid injections as well as medications with no relief     He states the symptoms are worsening. The patient mentions the symptoms started 2 year(s) ago. His symptoms occur while walking, sitting, standing and lifting.       Modifying factors - worsened by bending, worsened by prolonged standing and worsened by walking. Relieving factors include relieved by recumbency and relieved by rest, but not relieved by exercise regimen, not relieved by nonsteroidal anti-inflammatory drugs and not relieved by physical therapy.    (26 Jul 2021 06:36)    Subjective:  Pt has no acute complaints. Feels that pain is well controlled    Allergies    No Known Allergies    Intolerances    Home meds: Lexapro    MEDICATIONS  (STANDING):  acetaminophen   Tablet .. 1000 milliGRAM(s) Oral every 8 hours  bisacodyl 5 milliGRAM(s) Oral at bedtime  BUpivacaine liposome 1.3% Injectable (no eMAR) 20 milliLiter(s) Local Injection once  enoxaparin Injectable 40 milliGRAM(s) SubCutaneous at bedtime  escitalopram 20 milliGRAM(s) Oral daily  ketorolac   Injectable 30 milliGRAM(s) IV Push every 8 hours  methocarbamol 750 milliGRAM(s) Oral every 8 hours  ondansetron    Tablet 4 milliGRAM(s) Oral every 6 hours  polyethylene glycol 3350 17 Gram(s) Oral daily  pregabalin 75 milliGRAM(s) Oral every 8 hours  senna 2 Tablet(s) Oral at bedtime    MEDICATIONS  (PRN):  naloxone Injectable 0.1 milliGRAM(s) IV Push every 3 minutes PRN For ANY of the following changes in patient status:  A. RR LESS THAN 10 breaths per minute, B. Oxygen saturation LESS THAN 90%, C. Sedation score of 6  oxyCODONE    IR 5 milliGRAM(s) Oral every 4 hours PRN Moderate Pain (4 - 6)  oxyCODONE    IR 10 milliGRAM(s) Oral every 4 hours PRN Severe Pain (7 - 10)          Drug Dosing Weight  Height (cm): 175.3 (26 Jul 2021 06:29)  Weight (kg): 98.4 (26 Jul 2021 06:29)  BMI (kg/m2): 32 (26 Jul 2021 06:29)  BSA (m2): 2.14 (26 Jul 2021 06:29)    PAST MEDICAL & SURGICAL HISTORY:  Dyslipidemia    Prostate cancer    COVID-19  march 2020    Back pain    Anxiety    Nephrolithiasis    Moderate obesity    H/O prostatectomy  april 2021        FAMILY HISTORY:  no cardiac disease    SOCIAL HISTORY:  no smoking  ADVANCE DIRECTIVES:    Vital Signs Last 24 Hrs  T(C): 36.3 (30 Jul 2021 06:00), Max: 36.8 (30 Jul 2021 00:31)  T(F): 97.4 (30 Jul 2021 06:00), Max: 98.2 (30 Jul 2021 00:31)  HR: 47 (30 Jul 2021 09:22) (47 - 69)  BP: 118/69 (30 Jul 2021 09:22) (102/65 - 128/70)  BP(mean): --  RR: 20 (30 Jul 2021 09:22) (17 - 20)  SpO2: 93% (30 Jul 2021 09:22) (93% - 98%)        PHYSICAL EXAM:      Constitutional: NAD  Eyes: PERRLA  ENMT: MMM  Neck: supple  Back: midline  Respiratory: CTA b/l  Cardiovascular: rrr, s1s2, no m/r/g  Gastrointestinal: soft, NTND, + BS  Extremities: wwp  Vascular: + 2 pulses radial  Neurological: AAO x 4  Skin: no rash  Lymph Nodes: no LAD  Musculoskeletal: no joint swelling  Psychiatric: normal affect        LABS:                          7.7    4.64  )-----------( 128      ( 30 Jul 2021 06:30 )             26.0   07-30    139  |  108  |  16  ----------------------------<  95  4.5   |  26  |  1.14    Ca    8.4      30 Jul 2021 06:30  Phos  3.0     07-30  Mg     2.2     07-30            POCT Blood Glucose.: 107 mg/dL (26 Jul 2021 08:49)          EKG:    ECHO, US:        RADIOLOGY:  < from: CT Lumbar Spine No Cont (07.17.21 @ 07:56) >  IMPRESSION:    CT lumbar spine for surgical planning.    1.  Multilevel degenerative changes, better appreciated on the MRI dated 6/14/2021 and notable for:    2.  L3-4 moderate foraminal narrowing.    3.  L4-5 grade I-II anterolisthesis with severe spinal stenosis and moderate/severe bilateral foraminal narrowing. Prominent facet arthropathy.    4.  L5-S1 severe bilateral foraminal narrowing.    5.  Schmorl's node along the L4 inferior endplate with surrounding degenerative changes.    < end of copied text >

## 2021-07-31 LAB
ANION GAP SERPL CALC-SCNC: 7 MMOL/L — SIGNIFICANT CHANGE UP (ref 5–17)
BUN SERPL-MCNC: 17 MG/DL — SIGNIFICANT CHANGE UP (ref 7–23)
CALCIUM SERPL-MCNC: 8.5 MG/DL — SIGNIFICANT CHANGE UP (ref 8.4–10.5)
CHLORIDE SERPL-SCNC: 106 MMOL/L — SIGNIFICANT CHANGE UP (ref 96–108)
CO2 SERPL-SCNC: 26 MMOL/L — SIGNIFICANT CHANGE UP (ref 22–31)
CREAT SERPL-MCNC: 1.17 MG/DL — SIGNIFICANT CHANGE UP (ref 0.5–1.3)
GLUCOSE SERPL-MCNC: 111 MG/DL — HIGH (ref 70–99)
HCT VFR BLD CALC: 26.7 % — LOW (ref 39–50)
HGB BLD-MCNC: 8 G/DL — LOW (ref 13–17)
MAGNESIUM SERPL-MCNC: 1.9 MG/DL — SIGNIFICANT CHANGE UP (ref 1.6–2.6)
MCHC RBC-ENTMCNC: 19.2 PG — LOW (ref 27–34)
MCHC RBC-ENTMCNC: 30 GM/DL — LOW (ref 32–36)
MCV RBC AUTO: 64 FL — LOW (ref 80–100)
NRBC # BLD: 0 /100 WBCS — SIGNIFICANT CHANGE UP (ref 0–0)
PHOSPHATE SERPL-MCNC: 3.8 MG/DL — SIGNIFICANT CHANGE UP (ref 2.5–4.5)
PLATELET # BLD AUTO: 141 K/UL — LOW (ref 150–400)
POTASSIUM SERPL-MCNC: 4.4 MMOL/L — SIGNIFICANT CHANGE UP (ref 3.5–5.3)
POTASSIUM SERPL-SCNC: 4.4 MMOL/L — SIGNIFICANT CHANGE UP (ref 3.5–5.3)
RBC # BLD: 4.17 M/UL — LOW (ref 4.2–5.8)
RBC # FLD: 15.9 % — HIGH (ref 10.3–14.5)
SODIUM SERPL-SCNC: 139 MMOL/L — SIGNIFICANT CHANGE UP (ref 135–145)
WBC # BLD: 3.76 K/UL — LOW (ref 3.8–10.5)
WBC # FLD AUTO: 3.76 K/UL — LOW (ref 3.8–10.5)

## 2021-07-31 PROCEDURE — 99232 SBSQ HOSP IP/OBS MODERATE 35: CPT

## 2021-07-31 RX ORDER — MAGNESIUM OXIDE 400 MG ORAL TABLET 241.3 MG
400 TABLET ORAL ONCE
Refills: 0 | Status: COMPLETED | OUTPATIENT
Start: 2021-07-31 | End: 2021-07-31

## 2021-07-31 RX ORDER — MAGNESIUM SULFATE 500 MG/ML
1 VIAL (ML) INJECTION ONCE
Refills: 0 | Status: DISCONTINUED | OUTPATIENT
Start: 2021-07-31 | End: 2021-07-31

## 2021-07-31 RX ADMIN — Medication 5 MILLIGRAM(S): at 21:29

## 2021-07-31 RX ADMIN — Medication 1000 MILLIGRAM(S): at 05:18

## 2021-07-31 RX ADMIN — ENOXAPARIN SODIUM 40 MILLIGRAM(S): 100 INJECTION SUBCUTANEOUS at 21:29

## 2021-07-31 RX ADMIN — MAGNESIUM OXIDE 400 MG ORAL TABLET 400 MILLIGRAM(S): 241.3 TABLET ORAL at 13:15

## 2021-07-31 RX ADMIN — Medication 1000 MILLIGRAM(S): at 13:15

## 2021-07-31 RX ADMIN — ESCITALOPRAM OXALATE 20 MILLIGRAM(S): 10 TABLET, FILM COATED ORAL at 05:19

## 2021-07-31 RX ADMIN — OXYCODONE HYDROCHLORIDE 10 MILLIGRAM(S): 5 TABLET ORAL at 22:30

## 2021-07-31 RX ADMIN — OXYCODONE HYDROCHLORIDE 5 MILLIGRAM(S): 5 TABLET ORAL at 05:21

## 2021-07-31 RX ADMIN — METHOCARBAMOL 750 MILLIGRAM(S): 500 TABLET, FILM COATED ORAL at 13:16

## 2021-07-31 RX ADMIN — Medication 75 MILLIGRAM(S): at 05:18

## 2021-07-31 RX ADMIN — Medication 75 MILLIGRAM(S): at 21:29

## 2021-07-31 RX ADMIN — Medication 1000 MILLIGRAM(S): at 22:30

## 2021-07-31 RX ADMIN — Medication 30 MILLIGRAM(S): at 05:19

## 2021-07-31 RX ADMIN — Medication 75 MILLIGRAM(S): at 13:15

## 2021-07-31 RX ADMIN — SENNA PLUS 2 TABLET(S): 8.6 TABLET ORAL at 21:29

## 2021-07-31 RX ADMIN — Medication 1000 MILLIGRAM(S): at 21:29

## 2021-07-31 RX ADMIN — METHOCARBAMOL 750 MILLIGRAM(S): 500 TABLET, FILM COATED ORAL at 05:18

## 2021-07-31 RX ADMIN — Medication 1000 MILLIGRAM(S): at 13:59

## 2021-07-31 RX ADMIN — OXYCODONE HYDROCHLORIDE 10 MILLIGRAM(S): 5 TABLET ORAL at 21:29

## 2021-07-31 RX ADMIN — METHOCARBAMOL 750 MILLIGRAM(S): 500 TABLET, FILM COATED ORAL at 21:29

## 2021-07-31 RX ADMIN — POLYETHYLENE GLYCOL 3350 17 GRAM(S): 17 POWDER, FOR SOLUTION ORAL at 13:14

## 2021-07-31 NOTE — PROGRESS NOTE ADULT - SUBJECTIVE AND OBJECTIVE BOX
HPI:  The patient is a 56-year-old man who is in back pain for several years it is worsened recently and he has had shooting pain going down his right leg. He has tried physical therapy and epidural steroid injections as well as medications with no relief     He states the symptoms are worsening. The patient mentions the symptoms started 2 year(s) ago. His symptoms occur while walking, sitting, standing and lifting.       Modifying factors - worsened by bending, worsened by prolonged standing and worsened by walking. Relieving factors include relieved by recumbency and relieved by rest, but not relieved by exercise regimen, not relieved by nonsteroidal anti-inflammatory drugs and not relieved by physical therapy.    (26 Jul 2021 06:36)    Hospital Course:  7/26: POD0 L4-S1 TLIF, L3-pelvic fusion  7/27: POD1, Nick, hypotensive overnight s/p 500 cc bolus. STAT CBC o/n for high drain output in setting of low SBP and bradycardia, Hgb stable. neuro stable. F/u lopez removal. Standing xrays before discharge   7/28: POD 2. CAREY o/n.   7/29: POD 3.   7/30: POD 4. HMV and EDGAR remain. Hgb downtrending, monitor. F/U when to resume eliquis/ASA. Needs BM   7/31: POD# 5. Neuro stable. No new complaint H/H trending up  OVERNIGHT EVENTS:none  Vital Signs Last 24 Hrs  T(C): 36.7 (31 Jul 2021 08:55), Max: 36.9 (30 Jul 2021 20:20)  T(F): 98 (31 Jul 2021 08:55), Max: 98.5 (30 Jul 2021 20:20)  HR: 68 (31 Jul 2021 08:55) (56 - 68)  BP: 157/86 (31 Jul 2021 08:55) (107/65 - 157/86)  BP(mean): --  RR: 17 (31 Jul 2021 08:55) (17 - 20)  SpO2: 98% (31 Jul 2021 08:55) (92% - 98%)    I&O's Summary    30 Jul 2021 07:01  -  31 Jul 2021 07:00  --------------------------------------------------------  IN: 800 mL / OUT: 761 mL / NET: 39 mL        PHYSICAL EXAM:  General: patient seen laying supine in bed in NAD  Neuro: AAOx3, FC, OE spontaneously, speech clear and fluent,  face symmetric, strength UEs 5/5, b/l LEs HF/KF/KE 3/5 DF/PF 5/5 greatly pain limited, sensation intact to light touch throughout  HEENT: PERRL, EOMI  Neck: supple  Cardiac: RRR, S1S2  Pulmonary: chest rise symmetric  Abdomen: soft, nontender, nondistended  Ext: warm, perfusing well  Wound: Back incision dressing c/d/i, HMV x 1, JPx    DIET: regular      LABS:                        8.0    3.76  )-----------( 141      ( 31 Jul 2021 07:51 )             26.7     07-31    139  |  106  |  17  ----------------------------<  111<H>  4.4   |  26  |  1.17    Ca    8.5      31 Jul 2021 07:51  Phos  3.8     07-31  Mg     1.9     07-31              CAPILLARY BLOOD GLUCOSE          Drug Levels: [] N/A    CSF Analysis: [] N/A      Allergies    No Known Allergies    Intolerances      MEDICATIONS:  Antibiotics:    Neuro:  acetaminophen   Tablet .. 1000 milliGRAM(s) Oral every 8 hours  escitalopram 20 milliGRAM(s) Oral daily  methocarbamol 750 milliGRAM(s) Oral every 8 hours  ondansetron    Tablet 4 milliGRAM(s) Oral every 6 hours  oxyCODONE    IR 5 milliGRAM(s) Oral every 4 hours PRN  oxyCODONE    IR 10 milliGRAM(s) Oral every 4 hours PRN  pregabalin 75 milliGRAM(s) Oral every 8 hours    Anticoagulation:  enoxaparin Injectable 40 milliGRAM(s) SubCutaneous at bedtime    OTHER:  bisacodyl 5 milliGRAM(s) Oral at bedtime  BUpivacaine liposome 1.3% Injectable (no eMAR) 20 milliLiter(s) Local Injection once  naloxone Injectable 0.1 milliGRAM(s) IV Push every 3 minutes PRN  polyethylene glycol 3350 17 Gram(s) Oral daily  senna 2 Tablet(s) Oral at bedtime    IVF:  magnesium sulfate  IVPB 1 Gram(s) IV Intermittent once    CULTURES:    RADIOLOGY & ADDITIONAL TESTS:      ASSESSMENT:    57 y/o Male with Obesity, HLD, h/o Prostate cancer, Anxiety, chronic LBP with radiculopathy now s/p L4-S1 TLIF, L3-pelvis (S2AI) fusion w/ plastics closure (7/26).    M54.16    M54.16/M47.817/[id number]    Handoff    MEWS Score    Dyslipidemia    Prostate cancer    COVID-19    Back pain    Anxiety    Nephrolithiasis    Moderate obesity    Spondylolisthesis at L4-L5 level    Spondylolisthesis at L4-L5 level    Back pain    Anemia due to acute blood loss    Thrombocytopenia    Essential hypertension    Insertion of Lopez catheter    Lumbar spinal fusion    No significant past surgical history    H/O prostatectomy    [id number]        PLAN:  Neuro:  - neuro/vital [id number]  - pain control, transitioned to orals  - monitor HMV/EDGAR outputs, plastics closure  - standing xrays before d/c    Cardiac:  - normotensive BP goal    Pulmonary:  - on RA    GI:  - Reg diet  - no BM; lactulose added to bowel reg    Renal:  - Trend lytes    Heme:  - Hgb 7.7, trend   - SCDs    Endo:  - no issues    ID:  - postop ancef  - afebrile monitor for leukocytosis  DVT PROPHYLAXIS:  [] Venodynes                                [] Heparin/Lovenox    DISPOSITION:    Assessment:  Present when checked    []  GCS  E   V  M     Heart Failure: []Acute, [] acute on chronic , []chronic  Heart Failure:  [] Diastolic (HFpEF), [] Systolic (HFrEF), []Combined (HFpEF and HFrEF), [] RHF, [] Pulm HTN, [] Other    [] YVETTE, [] ATN, [] AIN, [] other  [] CKD1, [] CKD2, [] CKD 3, [] CKD 4, [] CKD 5, []ESRD    Encephalopathy: [] Metabolic, [] Hepatic, [] toxic, [] Neurological, [] Other    Abnormal Nurtitional Status: [] malnurtition (see nutrition note), [ ]underweight: BMI < 19, [] morbid obesity: BMI >40, [] Cachexia    [] Sepsis  [] hypovolemic shock,[] cardiogenic shock, [] hemorrhagic shock, [] neuogenic shock  [] Acute Respiratory Failure  []Cerebral edema, [] Brain compression/ herniation,   [] Functional quadriplegia  [] Acute blood loss anemia

## 2021-07-31 NOTE — PROGRESS NOTE ADULT - SUBJECTIVE AND OBJECTIVE BOX
Patient is a 56y old  Male who presents with a chief complaint of lumbar stenosis (31 Jul 2021 11:14)    INTERVAL EVENTS:  CBC stable  Complaining of mild constipation       SUBJECTIVE:  No chest pain , dyspnea, diarrhea , or vomiting   Appetite adequate   Rest of 12 point review of systems negative unless documented otherwise in note.    MEDICATIONS:  MEDICATIONS  (STANDING):  acetaminophen   Tablet .. 1000 milliGRAM(s) Oral every 8 hours  bisacodyl 5 milliGRAM(s) Oral at bedtime  BUpivacaine liposome 1.3% Injectable (no eMAR) 20 milliLiter(s) Local Injection once  enoxaparin Injectable 40 milliGRAM(s) SubCutaneous at bedtime  escitalopram 20 milliGRAM(s) Oral daily  methocarbamol 750 milliGRAM(s) Oral every 8 hours  ondansetron    Tablet 4 milliGRAM(s) Oral every 6 hours  polyethylene glycol 3350 17 Gram(s) Oral daily  pregabalin 75 milliGRAM(s) Oral every 8 hours  senna 2 Tablet(s) Oral at bedtime    MEDICATIONS  (PRN):  naloxone Injectable 0.1 milliGRAM(s) IV Push every 3 minutes PRN For ANY of the following changes in patient status:  A. RR LESS THAN 10 breaths per minute, B. Oxygen saturation LESS THAN 90%, C. Sedation score of 6  oxyCODONE    IR 5 milliGRAM(s) Oral every 4 hours PRN Moderate Pain (4 - 6)  oxyCODONE    IR 10 milliGRAM(s) Oral every 4 hours PRN Severe Pain (7 - 10)      Allergies    No Known Allergies    Intolerances        OBJECTIVE:  Vital Signs Last 24 Hrs  T(C): 36.9 (31 Jul 2021 16:14), Max: 37.3 (31 Jul 2021 14:47)  T(F): 98.4 (31 Jul 2021 16:14), Max: 99.1 (31 Jul 2021 14:47)  HR: 72 (31 Jul 2021 20:45) (66 - 72)  BP: 132/62 (31 Jul 2021 20:45) (119/69 - 157/86)  BP(mean): --  RR: 17 (31 Jul 2021 20:45) (16 - 21)  SpO2: 95% (31 Jul 2021 20:45) (95% - 98%)  I&O's Summary    30 Jul 2021 07:01  -  31 Jul 2021 07:00  --------------------------------------------------------  IN: 800 mL / OUT: 761 mL / NET: 39 mL    31 Jul 2021 07:01  -  31 Jul 2021 23:46  --------------------------------------------------------  IN: 1200 mL / OUT: 270 mL / NET: 930 mL        PHYSICAL EXAM:  Gen: Lying in bed at time of exam; in no acute distress  HEENT: NCAT, MMM, clear OP  Neck: supple, trachea at midline  CV: RRR, +S1/S2  Pulm: adequate respiratory effort, no increase in work of breathing  Abd: soft, NTND  Skin: warm and dry, no new rashes vs prior report  Ext: WWP, no LE edema  Neuro: AOx3, no gross focal neurological deficits  Psych: affect and behavior appropriate, pleasant    LABS:                        8.0    3.76  )-----------( 141      ( 31 Jul 2021 07:51 )             26.7     07-31    139  |  106  |  17  ----------------------------<  111<H>  4.4   |  26  |  1.17    Ca    8.5      31 Jul 2021 07:51  Phos  3.8     07-31  Mg     1.9     07-31          CAPILLARY BLOOD GLUCOSE            MICRODATA:      RADIOLOGY/OTHER STUDIES:

## 2021-08-01 ENCOUNTER — TRANSCRIPTION ENCOUNTER (OUTPATIENT)
Age: 56
End: 2021-08-01

## 2021-08-01 VITALS
TEMPERATURE: 99 F | OXYGEN SATURATION: 95 % | SYSTOLIC BLOOD PRESSURE: 139 MMHG | RESPIRATION RATE: 17 BRPM | HEART RATE: 75 BPM | DIASTOLIC BLOOD PRESSURE: 73 MMHG

## 2021-08-01 LAB
ANION GAP SERPL CALC-SCNC: 8 MMOL/L — SIGNIFICANT CHANGE UP (ref 5–17)
BUN SERPL-MCNC: 17 MG/DL — SIGNIFICANT CHANGE UP (ref 7–23)
CALCIUM SERPL-MCNC: 9.1 MG/DL — SIGNIFICANT CHANGE UP (ref 8.4–10.5)
CHLORIDE SERPL-SCNC: 104 MMOL/L — SIGNIFICANT CHANGE UP (ref 96–108)
CO2 SERPL-SCNC: 26 MMOL/L — SIGNIFICANT CHANGE UP (ref 22–31)
CREAT SERPL-MCNC: 1.16 MG/DL — SIGNIFICANT CHANGE UP (ref 0.5–1.3)
GLUCOSE SERPL-MCNC: 98 MG/DL — SIGNIFICANT CHANGE UP (ref 70–99)
HCT VFR BLD CALC: 26.8 % — LOW (ref 39–50)
HGB BLD-MCNC: 8.1 G/DL — LOW (ref 13–17)
MAGNESIUM SERPL-MCNC: 1.8 MG/DL — SIGNIFICANT CHANGE UP (ref 1.6–2.6)
MCHC RBC-ENTMCNC: 19.2 PG — LOW (ref 27–34)
MCHC RBC-ENTMCNC: 30.2 GM/DL — LOW (ref 32–36)
MCV RBC AUTO: 63.5 FL — LOW (ref 80–100)
NRBC # BLD: 0 /100 WBCS — SIGNIFICANT CHANGE UP (ref 0–0)
PHOSPHATE SERPL-MCNC: 3.9 MG/DL — SIGNIFICANT CHANGE UP (ref 2.5–4.5)
PLATELET # BLD AUTO: 160 K/UL — SIGNIFICANT CHANGE UP (ref 150–400)
POTASSIUM SERPL-MCNC: 4.4 MMOL/L — SIGNIFICANT CHANGE UP (ref 3.5–5.3)
POTASSIUM SERPL-SCNC: 4.4 MMOL/L — SIGNIFICANT CHANGE UP (ref 3.5–5.3)
RBC # BLD: 4.22 M/UL — SIGNIFICANT CHANGE UP (ref 4.2–5.8)
RBC # FLD: 15.8 % — HIGH (ref 10.3–14.5)
SODIUM SERPL-SCNC: 138 MMOL/L — SIGNIFICANT CHANGE UP (ref 135–145)
WBC # BLD: 5.41 K/UL — SIGNIFICANT CHANGE UP (ref 3.8–10.5)
WBC # FLD AUTO: 5.41 K/UL — SIGNIFICANT CHANGE UP (ref 3.8–10.5)

## 2021-08-01 PROCEDURE — 86769 SARS-COV-2 COVID-19 ANTIBODY: CPT

## 2021-08-01 PROCEDURE — 36415 COLL VENOUS BLD VENIPUNCTURE: CPT

## 2021-08-01 PROCEDURE — 85025 COMPLETE CBC W/AUTO DIFF WBC: CPT

## 2021-08-01 PROCEDURE — 80048 BASIC METABOLIC PNL TOTAL CA: CPT

## 2021-08-01 PROCEDURE — 85018 HEMOGLOBIN: CPT

## 2021-08-01 PROCEDURE — 86850 RBC ANTIBODY SCREEN: CPT

## 2021-08-01 PROCEDURE — 99024 POSTOP FOLLOW-UP VISIT: CPT

## 2021-08-01 PROCEDURE — C1889: CPT

## 2021-08-01 PROCEDURE — 99232 SBSQ HOSP IP/OBS MODERATE 35: CPT

## 2021-08-01 PROCEDURE — 82330 ASSAY OF CALCIUM: CPT

## 2021-08-01 PROCEDURE — 85027 COMPLETE CBC AUTOMATED: CPT

## 2021-08-01 PROCEDURE — 83735 ASSAY OF MAGNESIUM: CPT

## 2021-08-01 PROCEDURE — 82962 GLUCOSE BLOOD TEST: CPT

## 2021-08-01 PROCEDURE — 86803 HEPATITIS C AB TEST: CPT

## 2021-08-01 PROCEDURE — 97161 PT EVAL LOW COMPLEX 20 MIN: CPT

## 2021-08-01 PROCEDURE — 97116 GAIT TRAINING THERAPY: CPT

## 2021-08-01 PROCEDURE — 76000 FLUOROSCOPY <1 HR PHYS/QHP: CPT

## 2021-08-01 PROCEDURE — 72100 X-RAY EXAM L-S SPINE 2/3 VWS: CPT

## 2021-08-01 PROCEDURE — 84132 ASSAY OF SERUM POTASSIUM: CPT

## 2021-08-01 PROCEDURE — 86900 BLOOD TYPING SEROLOGIC ABO: CPT

## 2021-08-01 PROCEDURE — 84295 ASSAY OF SERUM SODIUM: CPT

## 2021-08-01 PROCEDURE — 97530 THERAPEUTIC ACTIVITIES: CPT

## 2021-08-01 PROCEDURE — 72100 X-RAY EXAM L-S SPINE 2/3 VWS: CPT | Mod: 26

## 2021-08-01 PROCEDURE — C1713: CPT

## 2021-08-01 PROCEDURE — 86901 BLOOD TYPING SEROLOGIC RH(D): CPT

## 2021-08-01 PROCEDURE — 84100 ASSAY OF PHOSPHORUS: CPT

## 2021-08-01 RX ORDER — METHOCARBAMOL 500 MG/1
1 TABLET, FILM COATED ORAL
Qty: 21 | Refills: 0
Start: 2021-08-01 | End: 2021-08-07

## 2021-08-01 RX ORDER — OXYCODONE HYDROCHLORIDE 5 MG/1
1 TABLET ORAL
Qty: 20 | Refills: 0
Start: 2021-08-01 | End: 2021-08-05

## 2021-08-01 RX ORDER — ACETAMINOPHEN 500 MG
2 TABLET ORAL
Qty: 112 | Refills: 0
Start: 2021-08-01 | End: 2021-08-14

## 2021-08-01 RX ORDER — POLYETHYLENE GLYCOL 3350 17 G/17G
17 POWDER, FOR SOLUTION ORAL
Qty: 0 | Refills: 0 | DISCHARGE
Start: 2021-08-01

## 2021-08-01 RX ADMIN — METHOCARBAMOL 750 MILLIGRAM(S): 500 TABLET, FILM COATED ORAL at 13:07

## 2021-08-01 RX ADMIN — Medication 1000 MILLIGRAM(S): at 05:58

## 2021-08-01 RX ADMIN — Medication 1000 MILLIGRAM(S): at 13:06

## 2021-08-01 RX ADMIN — METHOCARBAMOL 750 MILLIGRAM(S): 500 TABLET, FILM COATED ORAL at 05:58

## 2021-08-01 RX ADMIN — Medication 75 MILLIGRAM(S): at 05:58

## 2021-08-01 RX ADMIN — OXYCODONE HYDROCHLORIDE 10 MILLIGRAM(S): 5 TABLET ORAL at 10:06

## 2021-08-01 RX ADMIN — Medication 1000 MILLIGRAM(S): at 13:23

## 2021-08-01 RX ADMIN — ESCITALOPRAM OXALATE 20 MILLIGRAM(S): 10 TABLET, FILM COATED ORAL at 05:58

## 2021-08-01 RX ADMIN — OXYCODONE HYDROCHLORIDE 10 MILLIGRAM(S): 5 TABLET ORAL at 09:25

## 2021-08-01 RX ADMIN — Medication 75 MILLIGRAM(S): at 13:06

## 2021-08-01 NOTE — PROGRESS NOTE ADULT - PROBLEM SELECTOR PLAN 4
pt currently normotensive off BP meds, continue to hold
Stop antihypertensive
pt currently normotensive off BP meds, continue to hold
pt currently normotensive off BP meds, continue to hold

## 2021-08-01 NOTE — DISCHARGE NOTE PROVIDER - NSDCCPCAREPLAN_GEN_ALL_CORE_FT
PRINCIPAL DISCHARGE DIAGNOSIS  Diagnosis: Back pain  Assessment and Plan of Treatment: Back pain      SECONDARY DISCHARGE DIAGNOSES  Diagnosis: Thrombocytopenia  Assessment and Plan of Treatment: Thrombocytopenia    Diagnosis: Essential hypertension  Assessment and Plan of Treatment: Essential hypertension    Diagnosis: Anemia due to acute blood loss  Assessment and Plan of Treatment: Anemia due to acute blood loss

## 2021-08-01 NOTE — PROGRESS NOTE ADULT - PROBLEM SELECTOR PLAN 2
Downtrending, but pt HDS, if further downtrend would give 1 unit pRBC
Now stable at 7.7; pt HDS, if further downtrend would give 1 unit pRBC
Appears stable on repeat CBC
Now stable at 7.7; pt HDS, if further downtrend would give 1 unit pRBC
Now stable at 7.7; pt HDS, if further downtrend would give 1 unit pRBC

## 2021-08-01 NOTE — PROGRESS NOTE ADULT - ASSESSMENT
56yMale with chronic LBP with radiculopathy now s/p L4-S1 TLIF, L3-pelvis (S2AI) fusion w/ plastics closure (7/26).
56yMale with chronic LBP with radiculopathy now s/p L4-S1 TLIF, L3-pelvis (S2AI) fusion w/ plastics closure (7/26).
A/P: Pt doing well post-op.   1. Maintain drains  2. Daily dressing changes with telfa and tegaderm
56yMale with chronic LBP with radiculopathy now s/p L4-S1 TLIF, L3-pelvis (S2AI) fusion w/ plastics closure (7/26).

## 2021-08-01 NOTE — PROGRESS NOTE ADULT - PROBLEM SELECTOR PLAN 3
Likely in setting of OR  -Continue to trend

## 2021-08-01 NOTE — PROGRESS NOTE ADULT - SUBJECTIVE AND OBJECTIVE BOX
HPI:  The patient is a 56-year-old man who is in back pain for several years it is worsened recently and he has had shooting pain going down his right leg. He has tried physical therapy and epidural steroid injections as well as medications with no relief     He states the symptoms are worsening. The patient mentions the symptoms started 2 year(s) ago. His symptoms occur while walking, sitting, standing and lifting.       Modifying factors - worsened by bending, worsened by prolonged standing and worsened by walking. Relieving factors include relieved by recumbency and relieved by rest, but not relieved by exercise regimen, not relieved by nonsteroidal anti-inflammatory drugs and not relieved by physical therapy.    (26 Jul 2021 06:36)    OVERNIGHT EVENTS: [facility] Course:   7/26: POD0 L4-S1 TLIF, L3-pelvic fusion  7/27: POD1, Nick, hypotensive overnight s/p 500 cc bolus. STAT CBC o/n for high drain output in setting of low SBP and bradycardia, Hgb stable. neuro stable. F/u lopez removal. Standing xrays before discharge   7/28: POD 2. CAREY o/n.   7/29: POD 3.   7/30: POD 4. HMV and EDGAR remain. Hgb downtrending, monitor. F/U when to resume eliquis/ASA. Needs BM   7/31: POD5. CAREY o/n, neuro stable. h/h stable  8/1: POD6. CAREY o/n, neuro stable.     Vital Signs Last 24 Hrs  T(C): 36.9 (31 Jul 2021 16:14), Max: 37.3 (31 Jul 2021 14:47)  T(F): 98.4 (31 Jul 2021 16:14), Max: 99.1 (31 Jul 2021 14:47)  HR: 72 (31 Jul 2021 20:45) (66 - 72)  BP: 132/62 (31 Jul 2021 20:45) (119/69 - 157/86)  BP(mean): --  RR: 17 (31 Jul 2021 20:45) (16 - 21)  SpO2: 95% (31 Jul 2021 20:45) (95% - 98%)    I&O's Summary    30 Jul 2021 07:01  -  31 Jul 2021 07:00  --------------------------------------------------------  IN: 800 mL / OUT: 761 mL / NET: 39 mL    31 Jul 2021 07:01  -  01 Aug 2021 02:50  --------------------------------------------------------  IN: 1200 mL / OUT: 270 mL / NET: 930 mL        PHYSICAL EXAM:  GEN: laying in bed, appears well, NAD  NEURO: AOx3. FC, OE spont, speech intact, face symmetric. CNII-XII intact. PERRL, EOMI. No pronator drift. MAEx4. 5/5 strength throughout. SILT  CV: RRR +S1/S2  PULM: CTAB  GI: Abd soft, NT/ND  EXT: ext warm, dry, nontender  WOUND: lumbar incision c/d/i    TUBES/LINES:  [] Lopez  [] Lumbar Drain  [x] Wound Drains: JPx1  [] Others      DIET:  [] NPO  [x] Mechanical  [] Tube feeds    LABS:                        8.0    3.76  )-----------( 141      ( 31 Jul 2021 07:51 )             26.7     07-31    139  |  106  |  17  ----------------------------<  111<H>  4.4   |  26  |  1.17    Ca    8.5      31 Jul 2021 07:51  Phos  3.8     07-31  Mg     1.9     07-31              CAPILLARY BLOOD GLUCOSE          Drug Levels: [] N/A    CSF Analysis: [] N/A      Allergies    No Known Allergies    Intolerances      MEDICATIONS:  Antibiotics:    Neuro:  acetaminophen   Tablet .. 1000 milliGRAM(s) Oral every 8 hours  escitalopram 20 milliGRAM(s) Oral daily  methocarbamol 750 milliGRAM(s) Oral every 8 hours  ondansetron    Tablet 4 milliGRAM(s) Oral every 6 hours  oxyCODONE    IR 5 milliGRAM(s) Oral every 4 hours PRN  oxyCODONE    IR 10 milliGRAM(s) Oral every 4 hours PRN  pregabalin 75 milliGRAM(s) Oral every 8 hours    Anticoagulation:  enoxaparin Injectable 40 milliGRAM(s) SubCutaneous at bedtime    OTHER:  bisacodyl 5 milliGRAM(s) Oral at bedtime  BUpivacaine liposome 1.3% Injectable (no eMAR) 20 milliLiter(s) Local Injection once  naloxone Injectable 0.1 milliGRAM(s) IV Push every 3 minutes PRN  polyethylene glycol 3350 17 Gram(s) Oral daily  senna 2 Tablet(s) Oral at bedtime    IVF:    CULTURES:    RADIOLOGY & ADDITIONAL TESTS:      ASSESSMENT:  55 y/o Male with Obesity, HLD, h/o Prostate cancer, Anxiety, chronic LBP with radiculopathy now s/p L4-S1 TLIF, L3-pelvis (S2AI) fusion w/ plastics closure (7/26).    M54.16    M54.16/M47.817/S31.620IO49.26    Handoff    MEWS Score    Dyslipidemia    Prostate cancer    COVID-19    Back pain    Anxiety    Nephrolithiasis    Moderate obesity    Spondylolisthesis at L4-L5 level    Spondylolisthesis at L4-L5 level    Back pain    Anemia due to acute blood loss    Thrombocytopenia    Essential hypertension    Insertion of Lopez catheter    Lumbar spinal fusion    No significant past surgical history    H/O prostatectomy    SysAdmin_VstLnk        PLAN:  Neuro:  - neuro/vital [id number]  - pain control  - EDGAR outputs, plastics closure  - standing xrays before d/c    Cardiac:  - normotensive BP goal    Pulmonary:  - on RA    GI:  - Reg diet  - no BM; lactulose added to bowel reg    Renal:  - Trend lytes    Heme:  - Hgb 7.7, trend   - SCDs    Endo:  - no issues    ID:  - postop ancef  - afebrile monitor for leukocytosis    Dispo: Full code. Pending home w/no needs.    D/w Dr. Guillory        Assessment:  Present when checked    []  GCS  E   V  M     Heart Failure: []Acute, [] acute on chronic , []chronic  Heart Failure:  [] Diastolic (HFpEF), [] Systolic (HFrEF), []Combined (HFpEF and HFrEF), [] RHF, [] Pulm HTN, [] Other    [] YVETTE, [] ATN, [] AIN, [] other  [] CKD1, [] CKD2, [] CKD 3, [] CKD 4, [] CKD 5, []ESRD    Encephalopathy: [] Metabolic, [] Hepatic, [] toxic, [] Neurological, [] Other    Abnormal Nurtitional Status: [] malnurtition (see nutrition note), [ ]underweight: BMI < 19, [] morbid obesity: BMI >40, [] Cachexia    [] Sepsis  [] hypovolemic shock,[] cardiogenic shock, [] hemorrhagic shock, [] neuogenic shock  [] Acute Respiratory Failure  []Cerebral edema, [] Brain compression/ herniation,   [] Functional quadriplegia  [] Acute blood loss anemia

## 2021-08-01 NOTE — DISCHARGE NOTE NURSING/CASE MANAGEMENT/SOCIAL WORK - PATIENT PORTAL LINK FT
You can access the FollowMyHealth Patient Portal offered by [facility] by registering at the following website: [email or web address]. By joining [employer]’s FollowMyHealth portal, you will also be able to view your health information using other applications (apps) compatible with our system.

## 2021-08-01 NOTE — PROGRESS NOTE ADULT - PROBLEM SELECTOR PROBLEM 2
Anemia due to acute blood loss

## 2021-08-01 NOTE — DISCHARGE NOTE PROVIDER - HOSPITAL COURSE
HPI:  Hospital Course:    Patient evaluated by PT/OT who recommened:  Patient is going home? rehab? hospice? Facility Name:     Hospital course c/b:     Exam on day of discharge:    Checklist:   - Obtained follow up appointment from NP  - Reviewed final recommendations of inpatient consults  - Neurologically stable for discharge  - Vitals stable for discharge   - Afebrile for discharge  - WBC is stable  - Sodium level is normal  - Pain is adequately controlled  - Pt has PICC/walker/brace/collar        HPI:  The patient is a 56-year-old man who is in back pain for several years it is worsened recently and he has had shooting pain going down his right leg. He has tried physical therapy and epidural steroid injections as well as medications with no relief     He states the symptoms are worsening. The patient mentions the symptoms started 2 year(s) ago. His symptoms occur while walking, sitting, standing and lifting.       Modifying factors - worsened by bending, worsened by prolonged standing and worsened by walking. Relieving factors include relieved by recumbency and relieved by rest, but not relieved by exercise regimen, not relieved by nonsteroidal anti-inflammatory drugs and not relieved by physical therapy.    (26 Jul 2021 06:36)    OVERNIGHT EVENTS: CAREY    Hospital Course:   7/26: POD0 L4-S1 TLIF, L3-pelvic fusion  7/27: POD1, Nick, hypotensive overnight s/p 500 cc bolus. STAT CBC o/n for high drain output in setting of low SBP and bradycardia, Hgb stable. neuro stable. F/u lopez removal. Standing xrays before discharge   7/28: POD 2. CAREY o/n.   7/29: POD 3.   7/30: POD 4. HMV and EDGAR remain. Hgb downtrending, monitor. F/U when to resume eliquis/ASA. Needs BM   7/31: POD5. CAREY o/n, neuro stable. h/h stable  8/1: POD6. CAREY o/n, neuro stable.       Hospital course c/b: nothing     Exam on day of discharge:  GEN: laying in bed, appears well, NAD  NEURO: AOx3. FC, OE spont, speech intact, face symmetric. CNII-XII intact. PERRL, EOMI. No pronator drift. MAEx4. 5/5 strength throughout. SILT  CV: RRR +S1/S2  PULM: CTAB  GI: Abd soft, NT/ND  EXT: ext warm, dry, nontender  WOUND: lumbar incision c/d/i

## 2021-08-01 NOTE — PROGRESS NOTE ADULT - REASON FOR ADMISSION
lumbar stenosis

## 2021-08-01 NOTE — DISCHARGE NOTE PROVIDER - NSDCFUADDINST_GEN_ALL_CORE_FT
Neurosurgery follow up appointment date/time:  - You have sutures in place, they will be removed in the outpatient office.  - please call the office to confirm appointment: [phone]     Wound Care:  - You can shower as usual. Pat incision dry.     Activity:  - fatigue is common after surgery, rest if you feel tired   - no bending, lifting, twisting or heavy lifting (15 Lbs maximum)  - walking is recommended, ambulate as tolerated  - you may shower when you get home, keep your incision dry at other times. No dressings/creams unless otherwise instructed.  - no bathing  - no driving within 24 hours of anesthesia or while taking prescription pain medications   - keep hydrated, drink plenty of water     Please also follow up with your primary care doctor to review your hospitalization, and any medication changes required.    Pain Expectations:  - pain after surgery is expected  - please take pain meds as prescribed   - use stool softeners if taking narcotic pain medications. Avoid constipation.    Medications:  - Continue your home lexapro as usual  - You can take tylenol 650mg every 6 hours for moderate pain as needed  - You can take oxycodone 5mg every 6 hours as needed for severe pain   - Take lyrica 75 mg every 8 hours as needed for nerve pain   - Take robaxin 750mg every 8 hours as needed for muscle spasm.   - Take senna 2 tabs at night to avoid constipation while you are taking oxycodone.  - new meds?  - pain meds?  - pain medications can cause constipation, you should eat a high fiber diet and may take a stool softener while on pain meds   - Avoid taking Advil (ibuprofen), Motrin (naproxen), or Aspirin for pain as they can cause bleeding     Call the office or come to ED if:  - wound has drainage or bleeding, increased redness or pain at incision site, neurological change, fever (>101), chills, night sweats, syncopy, nausea/vomiting      WITHIN 24 HOURS OF DISCHARGE, PLEASE CONTACT [phone] WITH ANY QUESTIONS OR CONCERNS  OTHERWISE, PLEASE CALL THE OFFICE WITH ANY QUESTIONS OR CONCERNS: [phone] Neurosurgery follow up appointment date/time:  - You have sutures in place, they will be removed in the outpatient office.  - please call the office to confirm appointment: [phone]     Wound Care:  - You can shower as usual. Pat incision dry.     Activity:  - fatigue is common after surgery, rest if you feel tired   - no bending, lifting, twisting or heavy lifting (15 Lbs maximum)  - walking is recommended, ambulate as tolerated  - you may shower when you get home, keep your incision dry at other times. No dressings/creams unless otherwise instructed.  - no bathing  - no driving within 24 hours of anesthesia or while taking prescription pain medications   - keep hydrated, drink plenty of water     Please also follow up with your primary care doctor to review your hospitalization, and any medication changes required.    Pain Expectations:  - pain after surgery is expected  - please take pain meds as prescribed   - use stool softeners if taking narcotic pain medications. Avoid constipation.    Medications:  - Continue your home lexapro as usual  - You can take tylenol 650mg every 6 hours for moderate pain as needed  - You can take oxycodone 5mg every 6 hours as needed for severe pain   - Take lyrica 75 mg every 8 hours as needed for nerve pain   - Take robaxin 750mg every 8 hours as needed for muscle spasm.   - Take miralax daily to avoid constipation while you are taking oxycodone.  - pain medications can cause constipation, you should eat a high fiber diet and may take a stool softener while on pain meds   - Avoid taking Advil (ibuprofen), Motrin (naproxen), or Aspirin for pain as they can cause bleeding     Call the office or come to ED if:  - wound has drainage or bleeding, increased redness or pain at incision site, neurological change, fever (>101), chills, night sweats, syncopy, nausea/vomiting      WITHIN 24 HOURS OF DISCHARGE, PLEASE CONTACT [phone] WITH ANY QUESTIONS OR CONCERNS  OTHERWISE, PLEASE CALL THE OFFICE WITH ANY QUESTIONS OR CONCERNS: [phone]

## 2021-08-01 NOTE — PROGRESS NOTE ADULT - PROVIDER SPECIALTY LIST ADULT
Hospitalist
Neurosurgery
Hospitalist
Hospitalist
Neurosurgery
Pain Medicine
Neurosurgery
Plastic Surgery
Hospitalist
Hospitalist

## 2021-08-01 NOTE — DISCHARGE NOTE PROVIDER - CARE PROVIDER_API CALL
Orlin Guillory; MS)  Neurosurgery  [address] [place] [address]  Phone: [phone]  Fax: [phone]  Follow Up Time:     Rohan Salcedo)  Plastic Surgery Surgery  [address] [facility], [address]  [place] [address]  Phone: [phone]  Fax: [phone]  Follow Up Time:    no

## 2021-08-01 NOTE — PROGRESS NOTE ADULT - PROBLEM SELECTOR PLAN 1
Management as per primary team
Management as per primary team    Watch for constipation   Continue bowel regimen with polyethylene glycol.   Has oxycodone ordered. Component of opioid induced constipation possible. Consider naloxegol or methylnaltrexone if constipated after miralax.
Management as per primary team
Management as per primary team    Watch for constipation   Continue bowel regimen with polyethylene glycol.   Has oxycodone ordered. Component of opioid induced constipation possible. Consider naloxegol or methylnaltrexone if constipated after miralax.
Management as per primary team

## 2021-08-01 NOTE — DISCHARGE NOTE NURSING/CASE MANAGEMENT/SOCIAL WORK - NSDCFUADDAPPT_GEN_ALL_CORE_FT
Please call and confirm a follow up appointment with Dr. Guillory  at [phone]    Please call and make a follow up appointment with Dr. Salcedo (plastic surgeon) [phone]

## 2021-08-01 NOTE — DISCHARGE NOTE PROVIDER - NSDCFUADDAPPT_GEN_ALL_CORE_FT
Please call and confirm a follow up appointment with Dr. Guillory  at [phone]    Please call and make a follow up appointment with Dr. Salcedo (plastic surgeon) [phone]

## 2021-08-01 NOTE — DISCHARGE NOTE PROVIDER - NSDCCPTREATMENT_GEN_ALL_CORE_FT
PRINCIPAL PROCEDURE  Procedure: Lumbar spinal fusion  Findings and Treatment:       SECONDARY PROCEDURE  Procedure: Insertion of Wen catheter  Findings and Treatment:

## 2021-08-01 NOTE — PROCEDURE NOTE - NSPOSTCAREGUIDE_GEN_A_CORE
Patient care per primary team./Verbal/written post procedure instructions were given to patient/caregiver
Verbal/written post procedure instructions were given to patient/caregiver

## 2021-08-01 NOTE — PROGRESS NOTE ADULT - SUBJECTIVE AND OBJECTIVE BOX
Patient is a 56y old  Male who presents with a chief complaint of lumbar stenosis (01 Aug 2021 07:07)    INTERVAL EVENTS:  Had bowel movement today. Regular consistency   No complaints at time of interview today     SUBJECTIVE:  Good appetite, no chest pain, dyspnea, nausea, or diarrhea   Rest of 12 point review of systems negative unless documented otherwise in note.    MEDICATIONS:  MEDICATIONS  (STANDING):  acetaminophen   Tablet .. 1000 milliGRAM(s) Oral every 8 hours  bisacodyl 5 milliGRAM(s) Oral at bedtime  BUpivacaine liposome 1.3% Injectable (no eMAR) 20 milliLiter(s) Local Injection once  enoxaparin Injectable 40 milliGRAM(s) SubCutaneous at bedtime  escitalopram 20 milliGRAM(s) Oral daily  methocarbamol 750 milliGRAM(s) Oral every 8 hours  ondansetron    Tablet 4 milliGRAM(s) Oral every 6 hours  polyethylene glycol 3350 17 Gram(s) Oral daily  pregabalin 75 milliGRAM(s) Oral every 8 hours  senna 2 Tablet(s) Oral at bedtime    MEDICATIONS  (PRN):  naloxone Injectable 0.1 milliGRAM(s) IV Push every 3 minutes PRN For ANY of the following changes in patient status:  A. RR LESS THAN 10 breaths per minute, B. Oxygen saturation LESS THAN 90%, C. Sedation score of 6  oxyCODONE    IR 5 milliGRAM(s) Oral every 4 hours PRN Moderate Pain (4 - 6)  oxyCODONE    IR 10 milliGRAM(s) Oral every 4 hours PRN Severe Pain (7 - 10)      Allergies    No Known Allergies    Intolerances        OBJECTIVE:  Vital Signs Last 24 Hrs  T(C): 37.4 (01 Aug 2021 16:39), Max: 37.4 (01 Aug 2021 16:39)  T(F): 99.3 (01 Aug 2021 16:39), Max: 99.3 (01 Aug 2021 16:39)  HR: 75 (01 Aug 2021 16:39) (56 - 75)  BP: 139/73 (01 Aug 2021 16:39) (127/77 - 153/84)  BP(mean): --  RR: 17 (01 Aug 2021 16:39) (15 - 18)  SpO2: 95% (01 Aug 2021 16:39) (92% - 97%)  I&O's Summary    31 Jul 2021 07:01  -  01 Aug 2021 07:00  --------------------------------------------------------  IN: 1200 mL / OUT: 660 mL / NET: 540 mL    01 Aug 2021 07:01  -  01 Aug 2021 22:11  --------------------------------------------------------  IN: 840 mL / OUT: 450 mL / NET: 390 mL        PHYSICAL EXAM:  Gen: Lying in bed at time of exam; in no acute distress  HEENT: NCAT, MMM, clear OP  Neck: supple, trachea at midline  CV: RRR, +S1/S2  Pulm: adequate respiratory effort, no increase in work of breathing  Abd: soft, NTND  Skin: warm and dry, no new rashes vs prior report  Ext: WWP, no LE edema  Neuro: AOx3, no gross focal neurological deficits  Psych: affect and behavior appropriate, pleasant      LABS:                        8.1    5.41  )-----------( 160      ( 01 Aug 2021 07:00 )             26.8     08-01    138  |  104  |  17  ----------------------------<  98  4.4   |  26  |  1.16    Ca    9.1      01 Aug 2021 07:00  Phos  3.9     08-01  Mg     1.8     08-01          CAPILLARY BLOOD GLUCOSE            MICRODATA:      RADIOLOGY/OTHER STUDIES:

## 2021-08-01 NOTE — DISCHARGE NOTE PROVIDER - NSDCMRMEDTOKEN_GEN_ALL_CORE_FT
Lexapro 20 mg oral tablet: 1 tab(s) orally once a day   Lexapro 20 mg oral tablet: 1 tab(s) orally once a day  oxyCODONE 5 mg oral capsule: 1 cap(s) orally every 6 hours, As Needed -for severe pain MDD:4 tabs   polyethylene glycol 3350 oral powder for reconstitution: 17 gram(s) orally once a day  pregabalin 75 mg oral capsule: 1 cap(s) orally every 8 hours, As Needed for neuropathic pain MDD:3 tabs  Robaxin-750 oral tablet: 1 tab(s) orally every 8 hours, As Needed -for muscle spasm MDD:3 tabs  Tylenol 325 mg oral tablet: 2 tab(s) orally every 6 hours, As Needed -for moderate pain

## 2021-08-01 NOTE — PROCEDURE NOTE - GENERAL PROCEDURE DETAILS
Bulb was taken off suction. Drain site was cleaned with chlorhexidine. Sutures were removed. Drain was pulled without resistance, tip was visualized and intact. Steri strips were placed over drain exit site.

## 2021-08-02 PROBLEM — U07.1 COVID-19: Chronic | Status: ACTIVE | Noted: 2021-04-09

## 2021-08-02 RX ORDER — ONDANSETRON 8 MG/1
1 TABLET, FILM COATED ORAL
Qty: 20 | Refills: 0
Start: 2021-08-02 | End: 2021-08-06

## 2021-08-04 DIAGNOSIS — M48.062 SPINAL STENOSIS, LUMBAR REGION WITH NEUROGENIC CLAUDICATION: ICD-10-CM

## 2021-08-04 DIAGNOSIS — Z86.16 PERSONAL HISTORY OF COVID-19: ICD-10-CM

## 2021-08-04 DIAGNOSIS — Z90.79 ACQUIRED ABSENCE OF OTHER GENITAL ORGAN(S): ICD-10-CM

## 2021-08-04 DIAGNOSIS — Z85.46 PERSONAL HISTORY OF MALIGNANT NEOPLASM OF PROSTATE: ICD-10-CM

## 2021-08-04 DIAGNOSIS — I10 ESSENTIAL (PRIMARY) HYPERTENSION: ICD-10-CM

## 2021-08-04 DIAGNOSIS — E66.9 OBESITY, UNSPECIFIED: ICD-10-CM

## 2021-08-04 DIAGNOSIS — T40.2X5A ADVERSE EFFECT OF OTHER OPIOIDS, INITIAL ENCOUNTER: ICD-10-CM

## 2021-08-04 DIAGNOSIS — D69.59 OTHER SECONDARY THROMBOCYTOPENIA: ICD-10-CM

## 2021-08-04 DIAGNOSIS — K59.03 DRUG INDUCED CONSTIPATION: ICD-10-CM

## 2021-08-04 DIAGNOSIS — R00.1 BRADYCARDIA, UNSPECIFIED: ICD-10-CM

## 2021-08-04 DIAGNOSIS — I95.9 HYPOTENSION, UNSPECIFIED: ICD-10-CM

## 2021-08-04 DIAGNOSIS — M54.16 RADICULOPATHY, LUMBAR REGION: ICD-10-CM

## 2021-08-04 DIAGNOSIS — E78.5 HYPERLIPIDEMIA, UNSPECIFIED: ICD-10-CM

## 2021-08-04 DIAGNOSIS — F41.9 ANXIETY DISORDER, UNSPECIFIED: ICD-10-CM

## 2021-08-04 DIAGNOSIS — D62 ACUTE POSTHEMORRHAGIC ANEMIA: ICD-10-CM

## 2021-08-04 DIAGNOSIS — M43.16 SPONDYLOLISTHESIS, LUMBAR REGION: ICD-10-CM

## 2021-08-07 RX ORDER — OXYCODONE 10 MG/1
10 TABLET ORAL
Qty: 60 | Refills: 0 | Status: DISCONTINUED | COMMUNITY
Start: 2021-08-06 | End: 2021-08-07

## 2021-08-09 ENCOUNTER — RESULT REVIEW (OUTPATIENT)
Age: 56
End: 2021-08-09

## 2021-08-10 ENCOUNTER — OUTPATIENT (OUTPATIENT)
Dept: OUTPATIENT SERVICES | Facility: HOSPITAL | Age: 56
LOS: 1 days | End: 2021-08-10
Payer: COMMERCIAL

## 2021-08-10 ENCOUNTER — NON-APPOINTMENT (OUTPATIENT)
Age: 56
End: 2021-08-10

## 2021-08-10 ENCOUNTER — APPOINTMENT (OUTPATIENT)
Dept: SPINE | Facility: CLINIC | Age: 56
End: 2021-08-10
Payer: COMMERCIAL

## 2021-08-10 VITALS
TEMPERATURE: 97.7 F | WEIGHT: 216 LBS | SYSTOLIC BLOOD PRESSURE: 117 MMHG | DIASTOLIC BLOOD PRESSURE: 70 MMHG | HEIGHT: 69 IN | RESPIRATION RATE: 12 BRPM | BODY MASS INDEX: 31.99 KG/M2 | OXYGEN SATURATION: 97 % | HEART RATE: 62 BPM

## 2021-08-10 DIAGNOSIS — Z90.79 ACQUIRED ABSENCE OF OTHER GENITAL ORGAN(S): Chronic | ICD-10-CM

## 2021-08-10 DIAGNOSIS — M47.817 SPONDYLOSIS W/OUT MYELOPATHY OR RADICULOPATHY, LUMBOSACRAL REGION: ICD-10-CM

## 2021-08-10 DIAGNOSIS — Z78.9 OTHER SPECIFIED HEALTH STATUS: ICD-10-CM

## 2021-08-10 PROCEDURE — 72100 X-RAY EXAM L-S SPINE 2/3 VWS: CPT | Mod: 26

## 2021-08-10 PROCEDURE — 72100 X-RAY EXAM L-S SPINE 2/3 VWS: CPT

## 2021-08-10 PROCEDURE — 99024 POSTOP FOLLOW-UP VISIT: CPT

## 2021-08-10 NOTE — HISTORY OF PRESENT ILLNESS
[FreeTextEntry1] : INITIAL VISIT (6/29/2021)\par The patient is a 56-year-old man who is in back pain for several years it is worsened recently and he has had shooting pain going down his right leg. He has tried physical therapy and epidural steroid injections as well as medications with no relief He states the symptoms are worsening. The patient mentions the symptoms started 2 year(s) ago. His symptoms occur while walking, sitting, standing and lifting. \par Modifying factors - worsened by bending, worsened by prolonged standing and worsened by walking. Relieving factors include relieved by recumbency and relieved by rest, but not relieved by exercise regimen, not relieved by nonsteroidal anti-inflammatory drugs and not relieved by physical therapy. \par \par He underwent elective lumbar surgery\par \par Hospital Course: \par 7/26: POD0 L4-S1 TLIF, L3-pelvic fusion\par 7/27: POD1, Nick, hypotensive overnight s/p 500 cc bolus. STAT CBC o/n for high drain output in setting of low SBP and bradycardia, Hgb stable. neuro stable. F/u lopez removal. Standing xrays before discharge \par 7/28: POD 2. CAREY o/n. \par 7/29: POD 3. \par 7/30: POD 4. HMV and EDGAR remain. Hgb downtrending, monitor. F/U when to resume eliquis/ASA. Needs BM \par 7/31: POD5. CAREY o/n, neuro stable. h/h stable\par 8/1: POD6. CAREY o/n, neuro stable. \par \par He was discharged to home on 8/1.

## 2021-08-10 NOTE — REVIEW OF SYSTEMS
[Numbness] : numbness [Tingling] : tingling [As Noted in HPI] : as noted in HPI [Negative] : Integumentary

## 2021-08-10 NOTE — REASON FOR VISIT
[Spouse] : spouse [de-identified] : L4/S1 laminectomy, L4-S1 TLIF, L3-S2AI posterior fusion [de-identified] : 7/26/2021 [de-identified] : 2 [de-identified] : He c/o intermittent worsening b/l LE numbness/shooting pain after walking. Also he noticed some urinary incontinence for the past one week. He denies fever/chills, weakness, difficulty walking. He ambulates with cane. He was seen by plastic sx for surgical incision management, planned for suture removal in next week.

## 2021-08-10 NOTE — PHYSICAL EXAM
[General Appearance - Alert] : alert [General Appearance - In No Acute Distress] : in no acute distress [General Appearance - Well Nourished] : well nourished [General Appearance - Well-Appearing] : healthy appearing [Longitudinal] : longitudinal [Healing Well] : healing well [No Drainage] : without drainage [Normal Skin] : normal [Oriented To Time, Place, And Person] : oriented to person, place, and time [Impaired Insight] : insight and judgment were intact [Affect] : the affect was normal [Memory Recent] : recent memory was not impaired [Person] : oriented to person [Place] : oriented to place [Time] : oriented to time [Abnormal Walk] : normal gait [Sutures Intact] : closed with intact sutures [Erythema] : not erythematous [Tender] : not tender [Warm] : not warm [Indurated] : not indurated [Fluctuant] : not fluctuant [FreeTextEntry1] : lower back

## 2021-08-10 NOTE — ASSESSMENT
[FreeTextEntry1] : PLAN\par - continue meds and ambulation as tolerates\par - f/u urology (intraop difficulty lopez insertion 2/2 BPH likely current urinary incontinence related this issue)\par - RTC in one month (Xray AP/Lat at that time)

## 2021-08-25 ENCOUNTER — RX RENEWAL (OUTPATIENT)
Age: 56
End: 2021-08-25

## 2021-09-05 ENCOUNTER — RESULT REVIEW (OUTPATIENT)
Age: 56
End: 2021-09-05

## 2021-09-07 ENCOUNTER — APPOINTMENT (OUTPATIENT)
Dept: PHYSICAL MEDICINE AND REHAB | Facility: CLINIC | Age: 56
End: 2021-09-07
Payer: COMMERCIAL

## 2021-09-07 ENCOUNTER — APPOINTMENT (OUTPATIENT)
Dept: SPINE | Facility: CLINIC | Age: 56
End: 2021-09-07
Payer: COMMERCIAL

## 2021-09-07 ENCOUNTER — OUTPATIENT (OUTPATIENT)
Dept: OUTPATIENT SERVICES | Facility: HOSPITAL | Age: 56
LOS: 1 days | End: 2021-09-07
Payer: COMMERCIAL

## 2021-09-07 VITALS — BODY MASS INDEX: 31.99 KG/M2 | RESPIRATION RATE: 16 BRPM | HEIGHT: 69 IN | WEIGHT: 216 LBS

## 2021-09-07 VITALS
HEART RATE: 52 BPM | BODY MASS INDEX: 31.99 KG/M2 | DIASTOLIC BLOOD PRESSURE: 94 MMHG | SYSTOLIC BLOOD PRESSURE: 152 MMHG | HEIGHT: 69 IN | WEIGHT: 216 LBS | RESPIRATION RATE: 12 BRPM | TEMPERATURE: 97.8 F | OXYGEN SATURATION: 98 %

## 2021-09-07 DIAGNOSIS — Z90.79 ACQUIRED ABSENCE OF OTHER GENITAL ORGAN(S): Chronic | ICD-10-CM

## 2021-09-07 PROCEDURE — 99024 POSTOP FOLLOW-UP VISIT: CPT

## 2021-09-07 PROCEDURE — 72100 X-RAY EXAM L-S SPINE 2/3 VWS: CPT

## 2021-09-07 PROCEDURE — 99204 OFFICE O/P NEW MOD 45 MIN: CPT

## 2021-09-07 PROCEDURE — 72100 X-RAY EXAM L-S SPINE 2/3 VWS: CPT | Mod: 26

## 2021-09-07 NOTE — PHYSICAL EXAM
[General Appearance - Alert] : alert [General Appearance - In No Acute Distress] : in no acute distress [General Appearance - Well Nourished] : well nourished [General Appearance - Well-Appearing] : healthy appearing [Longitudinal] : longitudinal [Healing Well] : healing well [Sutures Intact] : closed with intact sutures [No Drainage] : without drainage [Normal Skin] : normal [Oriented To Time, Place, And Person] : oriented to person, place, and time [Impaired Insight] : insight and judgment were intact [Affect] : the affect was normal [Memory Recent] : recent memory was not impaired [Person] : oriented to person [Place] : oriented to place [Time] : oriented to time [Abnormal Walk] : normal gait [Limited Balance] : the patient's balance was impaired [Erythema] : not erythematous [Tender] : not tender [Warm] : not warm [Indurated] : not indurated [Fluctuant] : not fluctuant [FreeTextEntry1] : lower back

## 2021-09-07 NOTE — REVIEW OF SYSTEMS
[As Noted in HPI] : as noted in HPI [Numbness] : numbness [Tingling] : tingling [Difficulty Walking] : difficulty walking [Negative] : Genitourinary

## 2021-09-07 NOTE — HISTORY OF PRESENT ILLNESS
[FreeTextEntry1] : INITIAL VISIT (6/29/2021)\par The patient is a 56-year-old man who is in back pain for several years it is worsened recently and he has had shooting pain going down his right leg. He has tried physical therapy and epidural steroid injections as well as medications with no relief He states the symptoms are worsening. The patient mentions the symptoms started 2 year(s) ago. His symptoms occur while walking, sitting, standing and lifting. \par Modifying factors - worsened by bending, worsened by prolonged standing and worsened by walking. Relieving factors include relieved by recumbency and relieved by rest, but not relieved by exercise regimen, not relieved by nonsteroidal anti-inflammatory drugs and not relieved by physical therapy. \par \par He underwent elective lumbar surgery\par \par Hospital Course: \par 7/26: POD0 L4-S1 TLIF, L3-pelvic fusion\par 7/27: POD1, Nick, hypotensive overnight s/p 500 cc bolus. STAT CBC o/n for high drain output in setting of low SBP and bradycardia, Hgb stable. neuro stable. F/u lopez removal. Standing xrays before discharge \par 7/28: POD 2. CAREY o/n. \par 7/29: POD 3. \par 7/30: POD 4. HMV and EDGAR remain. Hgb downtrending, monitor. F/U when to resume eliquis/ASA. Needs BM \par 7/31: POD5. CAREY o/n, neuro stable. h/h stable\par 8/1: POD6. CAREY o/n, neuro stable. \par \par He was discharged to home on 8/1.\par \par POST OP visit (8/10/2021)\par He c/o intermittent worsening b/l LE numbness/shooting pain after walking. Also he noticed some urinary incontinence for the past one week. He denies fever/chills, weakness, difficulty walking. He ambulates with cane. He was seen by plastic sx for surgical incision management, planned for suture removal in next week.

## 2021-09-07 NOTE — REASON FOR VISIT
[Spouse] : spouse [de-identified] : L4/S1 laminectomy, L4-S1 TLIF, L3-S2AI posterior fusion [de-identified] : 7/26/2021 [de-identified] : 5 [de-identified] : Today he returns c/o worsening RLE pain. Pain is described as moderated burning sensation on his R groin radiating down to his leg/bottom of foot during ambulation. Sitting/resting relieve his symptoms. Currently taking gabapentin 300mg TID, robanxin without improvement. He continues to ambulate with cane. His urinary symptoms improving and has upcoming appointment with urology in couple of weeks. He denies any other new/worsening focal neuro deficits.

## 2021-09-07 NOTE — DATA REVIEWED
[de-identified] : L-spine AP/Lat today in PACS shows stable posterior fusion. severe R sided arthritic hip joint which has not been changed compared to previous xray

## 2021-09-08 ENCOUNTER — NON-APPOINTMENT (OUTPATIENT)
Age: 56
End: 2021-09-08

## 2021-09-08 NOTE — ASSESSMENT
[FreeTextEntry1] : Impression:\par 1. Right Hip OA\par \par Plan: The history, physical examination, and imaging were reviewed; and discussed in detail with the patient along with treatment options including physical therapy, oral medication, image guided injections, and surgery; as well as alternative therapeutics such as acupuncture and massage. We also discussed the likely need for OSBALDO in the near future, however he is still recovering from the most recent spine surgery. The patient is interested in interventional options for symptom reduction, we will plan for Fluoroscopically guided CSI. We discussed all the risks, benefits, alternative treatments, and prognosis. The patient expressed understanding and would like to move forward. We will schedule for the injection as well as follow up post-procedure. The patient expressed verbal understanding and is in agreement with the plan of care. All of the patient's questions and concerns were addressed during today's visit.\par

## 2021-09-08 NOTE — PHYSICAL EXAM
[FreeTextEntry1] : LUMBAR\par GEN: AAOx3. NAD.\par LS ROM: Flexion, extension, side-bending, rotation, oblique extension all full/pain free.  \par HIP ROM: Right limited IR/ER w/ pain end-range\par PALP: No TTP midline spinous processes, paravertebral muscles, SIJ, or greater trochanters B/L.\par INSP: Spine alignment is midline, with no evidence of scoliosis.\par STRENGTH: 5/5 bilateral hip flexors, knee extensors, ankle dorsiflexors, long toe extensors, ankle plantar flexors, hip abductors.\par SENS: Grossly intact to LT BLE.\par REFLEXES: Symmetric patella, achilles. \par TONE: Normal, No clonus.\par STANCE: No Trendelenburg with single leg stance.\par GAIT: (+) antalgic, normal reciprocating heel to toe\par SPECIAL: SLR and Slump (-) bilaterally. FADIR/KARYN (+) Right.

## 2021-09-08 NOTE — HISTORY OF PRESENT ILLNESS
[FreeTextEntry1] : Mr. CIRA HODGE is a very pleasant 56 year male who comes in for evaluation of right hip pain that has been ongoing for over a year without any specific injury or inciting event. The patient is s/p laminectomy and fusion 7/26/2021. After surgery he continues to have right hip pain radiating through the right thigh and occasionally into the right foot. Symptoms are intermittent and described as sharp. The pain is rated as 9/10 and ranges from 9-10/10. The patient's symptoms are aggravated by walking and alleviated by sitting. The patient is on Disability. The patient denies any night pain, numbness/tingling, weakness, or bowel/bladder dysfunction. The patient has no other complaints at this time.\par

## 2021-09-08 NOTE — DATA REVIEWED
[FreeTextEntry1] : XR LS 09/2021: S/P posterior instrumentation/fusion. Severe R hip joint arthrosis. \par  \par

## 2021-09-08 NOTE — REASON FOR VISIT
Bent-Knee Calf Stretch    This exercise is designed to stretch and strengthen your feet and ankles. Before beginning the exercise, read through all the instructions. While exercising, breathe normally and dont bounce. If you feel any pain, stop the exercise. If pain persists, inform your healthcare provider:  · Stand an arms length away from a wall. Place the palms of your hands on the wall. Step forward about 12 inches with your ______ foot.  · Keeping toes pointed forward and both heels on the floor, bend both knees and lean forward. Hold for ______ seconds. Relax.  · Repeat ______ times. Do ______ sets a day.  Date Last Reviewed: 8/16/2015  © 1930-7451 [employer]. [address] [place] [address]. All rights reserved. This information is not intended as a substitute for professional medical care. Always follow your healthcare professional's instructions.        Leg and Knee Exercises: Hip Pulls    The following exercise helps build strong, balanced leg muscles. Make sure to adjust exercise bands as instructed by your physical therapist. He or she will tell you how many times to do the exercise:  · Stand with one leg about 1 foot away from a wall. The other foot (attached to a pulley or rubber tubing) should be a step behind.  · Pull your attached foot forward, keeping your knees straight but not locked. (Point your toe straight forward unless told otherwise by your therapist.)  · Return slowly and steadily to your starting position.  Note: To prevent injury, always warm up and stretch before your strengthening exercises. Stop any exercise that causes pain. Discuss it with your physical therapist or healthcare provider.   Date Last Reviewed: 10/4/2015  © 2627-4950 [employer]. [address] [place] [id number]. All rights reserved. This information is not intended as a substitute for professional medical care. Always follow your healthcare professional's  instructions.        Quad Set for Leg and Knee    This exercise is designed to stretch and strengthen your knee. Before beginning, read through all the instructions. While exercising, breathe normally and use smooth movements. If you feel any pain, stop the exercise. If pain persists, call your healthcare provider.  1.  Sit on the floor with one leg straight, the other bent.  2.  Flex the foot of your straight leg by pointing your toes toward you. Press the back of your knee into the floor while tightening the muscle on the top of your thigh. Hold for ______ seconds. Then relax.  3.  Repeat ______ times. Do ______ sets a day.  Caution  · Dont arch your back.  · Dont hunch your shoulders.  Date Last Reviewed: 9/20/2015  © 5819-7134 [employer]. [address] [place] [address]. All rights reserved. This information is not intended as a substitute for professional medical care. Always follow your healthcare professional's instructions.        Hamstring Curls    The following exercise helps build strong, balanced leg muscles. Make sure to adjust exercise machines as instructed by your physical therapist. He or she will tell you how many times to do the exercise.  Note: To prevent injury, always warm up and stretch before your strengthening exercises. Stop any exercise that causes pain. Discuss it with your physical therapist or doctor.  · Lying on your stomach, pull one leg up as far as you comfortably can.  · Let your leg uncurl slowly and steadily.  · Take care not to arch your back.  Date Last Reviewed: 8/16/2015  © 2656-0804 [employer]. [address] [place] [address]. All rights reserved. This information is not intended as a substitute for professional medical care. Always follow your healthcare professional's instructions.        Leg and Knee Exercises: Heel Raise    This exercise is designed to strengthen your knee and calf. Before beginning, read through all  the instructions. While exercising, breathe normally and use smooth movements. If you feel any pain, stop the exercise. If pain persists, inform your healthcare provider.  Caution  · Dont lock your knees.  · Dont arch your back.   · Stand with both feet flat on the floor, shoulder-width apart.  · If you need support, steady yourself with your hand on a ledge, wall, or table.  · Raise both heels so youre standing on your toes.  · Hold for ______ seconds. Slowly lower your heels to the floor.  · Repeat ______ times. Do ______ sets a day.  Note: As you become stronger, stand on one foot at a time, and raise that heel off the floor.  Date Last Reviewed: 8/16/2015  © 0574-2597 [email or web address]. [address] [place] [address]. All rights reserved. This information is not intended as a substitute for professional medical care. Always follow your healthcare professional's instructions.        Leg and Knee Exercises: Leg Lunge     This exercise is designed to stretch and strengthen your knee. Before beginning, read through all the instructions. Start with a warm-up, which can help prevent muscle soreness and improve coordination so you do not fall. While exercising, breathe normally and use smooth movements. If you feel any pain, stop the exercise. If pain persists, call your healthcare provider.  1. After a brief warm-up, such as brisk walking for a few minutes, stand with your feet shoulder-width apart.  2. With your _______ foot, step out and lower yourself into a comfortable position. Keep your back straight and your feet pointing straight ahead. As you step, the heel of the other foot lifts off the floor.  3. Return smoothly to your starting position.  4. Repeat ______ times. Do ______ sets a day.  Caution  · Dont let your forward knee go past your toes.  · Dont lunge so far that your rear knee touches the floor.  · Keep knees in line with the second toe.   Date Last Reviewed: 9/3/2015  ©  0300-6416 [employer]. [address] [place] [address]. All rights reserved. This information is not intended as a substitute for professional medical care. Always follow your healthcare professional's instructions.        Leg and Knee Exercises: Leg Press    The following exercise helps build strong, balanced leg muscles. Make sure to adjust exercise machines as instructed by your physical therapist. He or she will tell you how many times to do the exercise.  Note: To prevent injury, always warm up and stretch before your strengthening exercises. Stop any exercise that causes pain. Discuss it with your physical therapist or doctor.  · Start with your leg bent so your knee is at a 90-degree angle.  · Push with your leg until it is almost completely straight. Be sure not to lock your knee.  · Slowly and steadily return your leg to its original position.  Date Last Reviewed: 8/16/2015  © 8368-3123 [employer]. [address] [place] [address]. All rights reserved. This information is not intended as a substitute for professional medical care. Always follow your healthcare professional's instructions.        Leg and Knee Exercises: Leg Raise    This exercise is designed to stretch and strengthen your knee. Before beginning, read through all the instructions. While exercising, breathe normally and use smooth movements. If you feel any pain, stop the exercise. If pain persists, call your healthcare provider.  Caution  · Dont arch your back.  · Dont hunch your shoulders.   · Sit on the floor with your_________ leg straight, the other bent.  · Tighten the thigh muscles on the top of your straight leg. You should feel the muscles contract. Raise that leg 6-8 inches. Then lower it slowly and steadily to the floor. Relax.  · Repeat ______ times.  Do ______ sets a day.  Date Last Reviewed: 8/16/2015  © 3176-4944 [employer]. [address] [place] [address]  [address]. All rights reserved. This information is not intended as a substitute for professional medical care. Always follow your healthcare professional's instructions.        Leg and Knee Exercises: Step-Ups    This exercise is designed to stretch and strengthen your knee. Before beginning, read through all the instructions. While exercising, breathe normally and use smooth movements. If you feel any pain, stop the exercise. If pain persists, inform your healthcare provider.  5. After a brief warm-up, such as brisk walking for a few minutes, stand with your _________ foot on a 3-inch to 5-inch support (such as a block of wood) and the other foot flat on the floor.  6. Shift your weight onto the foot on the block, straightening that knee and raising your other foot off the floor. Then slowly lower the foot until only the heel touches the floor.  7. Return to starting position.  8. Repeat ______ times. Do ______ sets a day.  Caution  · Dont lock your knees.  · Keep your weight on the foot thats on the block-- dont push off from the floor.   Date Last Reviewed: 9/3/2015  © 8227-9088 [employer]. [address] [place] [address], [place] [address]. All rights reserved. This information is not intended as a substitute for professional medical care. Always follow your healthcare professional's instructions.         [Initial Evaluation] : an initial evaluation [FreeTextEntry1] : right hip pain

## 2021-09-13 LAB — PSA, POST - PROSTATECTOMY: <0.01 NG/ML

## 2021-09-16 ENCOUNTER — APPOINTMENT (OUTPATIENT)
Dept: PHYSICAL MEDICINE AND REHAB | Facility: CLINIC | Age: 56
End: 2021-09-16
Payer: COMMERCIAL

## 2021-09-16 ENCOUNTER — APPOINTMENT (OUTPATIENT)
Dept: UROLOGY | Facility: CLINIC | Age: 56
End: 2021-09-16
Payer: COMMERCIAL

## 2021-09-16 VITALS — SYSTOLIC BLOOD PRESSURE: 134 MMHG | TEMPERATURE: 98 F | HEART RATE: 70 BPM | DIASTOLIC BLOOD PRESSURE: 84 MMHG

## 2021-09-16 PROCEDURE — 77002 NEEDLE LOCALIZATION BY XRAY: CPT

## 2021-09-16 PROCEDURE — 20610 DRAIN/INJ JOINT/BURSA W/O US: CPT | Mod: RT

## 2021-09-16 PROCEDURE — 99213 OFFICE O/P EST LOW 20 MIN: CPT

## 2021-09-16 NOTE — HISTORY OF PRESENT ILLNESS
[FreeTextEntry1] : 56 year old male s/p single port RALP in April 2021\par \par PSA <0.01\par Wears 1-2 depends a day, occasional TASH\par Inconsistent with Kegel exercises\par \par Recently had back surgery, follow up for possible hip replacement \par \par Erections ok post RALP, not really interested at this time due to orthopedic issues \par Has been taking Cialias 5 m Daily

## 2021-09-16 NOTE — PHYSICAL EXAM
[General Appearance - Well Developed] : well developed [General Appearance - Well Nourished] : well nourished [Normal Appearance] : normal appearance [Well Groomed] : well groomed [General Appearance - In No Acute Distress] : no acute distress [Abdomen Soft] : soft [Abdomen Tenderness] : non-tender [Costovertebral Angle Tenderness] : no ~M costovertebral angle tenderness [] : no respiratory distress [Respiration, Rhythm And Depth] : normal respiratory rhythm and effort [Exaggerated Use Of Accessory Muscles For Inspiration] : no accessory muscle use

## 2021-09-16 NOTE — ASSESSMENT
[FreeTextEntry1] : 56 year old male s/p Single port RALP \par -PSA <0.01\par -can try Cialias 20 mg for ED\par -continue with Kegel exercises\par \par \par \par RTC in 6 months with PSA\par

## 2021-09-20 ENCOUNTER — APPOINTMENT (OUTPATIENT)
Dept: ORTHOPEDIC SURGERY | Facility: CLINIC | Age: 56
End: 2021-09-20
Payer: COMMERCIAL

## 2021-09-20 VITALS
BODY MASS INDEX: 31.99 KG/M2 | HEART RATE: 55 BPM | HEIGHT: 69 IN | DIASTOLIC BLOOD PRESSURE: 70 MMHG | WEIGHT: 216 LBS | OXYGEN SATURATION: 97 % | SYSTOLIC BLOOD PRESSURE: 124 MMHG

## 2021-09-20 DIAGNOSIS — Z72.3 LACK OF PHYSICAL EXERCISE: ICD-10-CM

## 2021-09-20 PROCEDURE — 99204 OFFICE O/P NEW MOD 45 MIN: CPT

## 2021-09-20 RX ORDER — ESCITALOPRAM OXALATE 20 MG/1
20 TABLET, FILM COATED ORAL
Refills: 0 | Status: ACTIVE | COMMUNITY

## 2021-09-20 NOTE — REVIEW OF SYSTEMS
[Arthralgia] : arthralgia [Joint Pain] : joint pain [Joint Stiffness] : joint stiffness [Anxiety] : anxiety [Depression] : depression [Sleep Disturbances] : ~T sleep disturbances [Feeling Weak] : feeling weak [Muscle Weakness] : muscle weakness [Negative] : Heme/Lymph

## 2021-09-21 NOTE — HISTORY OF PRESENT ILLNESS
[___ mths] : [unfilled] month(s) ago [10] : a current pain level of 10/10 [Intermit.] : ~He/She~ states the symptoms seem to be intermittent [Walking] : worsened by walking [Rest] : relieved by rest [de-identified] : 9/20/21: 55y/o male referred by Dr. Guillory for right hip pain progressive for several months. 50% pain relief from right hip CSI on 9/16/21. S/p lumbar fusion, doing well with respect to back pain and leg weakness but persistent wound healing issues with multiple dry scabs; following with Dr. Salcedo for this. Also recently underwent prostate surgery for CA. Takes only pregabalin for pain. Has not been doing any PT. Ambulatory tolerance was less than a block with cane prior to the CSI; now maybe a block and a half without the cane. Previously was very active and is very frustrated by his inability to get back to his prior level of activity.\par \par PMH includes prostate CA as above as well as depression.  [de-identified] : patient describes pain as achy and radiating down to knee

## 2021-09-21 NOTE — PHYSICAL EXAM
[de-identified] : General appearance: well nourished and hydrated, pleasant, alert and oriented x 3, cooperative.  \par HEENT: normocephalic, EOM intact, wearing mask, external auditory canal clear.  \par Cardiovascular: no lower leg edema, no varicosities, dorsalis pedis pulses palpable and symmetric.  \par Lymphatics: no palpable lymphadenopathy, no lymphedema.  \par Neurologic: sensation is normal, no muscle weakness in upper or lower extremities, patella tendon reflexes present and symmetric.  \par Dermatologic: skin moist, warm, no rash.  \par Spine: cervical spine with normal lordosis and painless range of motion, thoracic spine with normal kyphosis and painless range of motion, lumbosacral spine with normal lordosis and stiff uncomfortable range of motion.  Midline lumbar incision with multiple linear patches of dry scab with slight marginal induration without drainage. \par Gait: slow to stand and get started. Right antalgia and limp without assistive device.\par \par Limb lengths: clinically similar\par \par Left hip:\par - Swelling: none\par - Ecchymosis: none\par - Erythema: none\par - Wounds: none\par - Tenderness: none\par - ROM: 110 flexion, 0 extension, 10 adduction, 40 abduction, 15 internal rotation, 75 external rotation\par - KARYN: painless\par - FADIR: painless\par - Yaritza: negative\par - Stinchfield: negative\par - Flexor power: 5/5\par - Abductor power: 5/5\par \par Right hip:\par - Swelling: none\par - Ecchymosis: none\par - Erythema: none\par - Wounds: none\par - Tenderness: mild anterior\par - ROM: 90 flexion, 0 extension, 0 adduction, 30 abduction, 5-10 obligate external rotation, 40 further external rotation\par - KARYN: painful\par - FADIR: painful\par - Yaritza: positive\par - Stinchfield: positive\par - Flexor power: 4/5\par - Abductor power: 4+/5 [de-identified] : Lumbar spine XRs taken on 9/7/21 demonstrate advanced right hip osteoarthritis with bone on bone superior articulation, associated osteophytes, cysts, and sclerosis on both sides of the joint. Moderate superior joint space narrowing noted on the left.

## 2021-09-21 NOTE — DISCUSSION/SUMMARY
[de-identified] : 57y/o male with right hip osteoarthritis\par - We discussed that the recent CSI precludes any arthroplasty until about 12/16/21\par - Cont HEP\par - Tylenol + meloxicam as well as pregabalin as needed\par - Cont wound care for the low back incision as per [facility]\par - Follow up in 3mo with pelvis and right hip XRs. Consider booking for OSBALDO at that time if the lumbar wound has healed by that time

## 2021-10-18 ENCOUNTER — RESULT REVIEW (OUTPATIENT)
Age: 56
End: 2021-10-18

## 2021-10-19 ENCOUNTER — APPOINTMENT (OUTPATIENT)
Dept: SPINE | Facility: CLINIC | Age: 56
End: 2021-10-19
Payer: COMMERCIAL

## 2021-10-19 ENCOUNTER — OUTPATIENT (OUTPATIENT)
Dept: OUTPATIENT SERVICES | Facility: HOSPITAL | Age: 56
LOS: 1 days | End: 2021-10-19
Payer: COMMERCIAL

## 2021-10-19 VITALS
TEMPERATURE: 97.5 F | WEIGHT: 216 LBS | OXYGEN SATURATION: 98 % | SYSTOLIC BLOOD PRESSURE: 173 MMHG | DIASTOLIC BLOOD PRESSURE: 91 MMHG | BODY MASS INDEX: 31.99 KG/M2 | HEIGHT: 69 IN | RESPIRATION RATE: 16 BRPM | HEART RATE: 49 BPM

## 2021-10-19 DIAGNOSIS — Z78.9 OTHER SPECIFIED HEALTH STATUS: ICD-10-CM

## 2021-10-19 DIAGNOSIS — Z90.79 ACQUIRED ABSENCE OF OTHER GENITAL ORGAN(S): Chronic | ICD-10-CM

## 2021-10-19 DIAGNOSIS — Z85.46 PERSONAL HISTORY OF MALIGNANT NEOPLASM OF PROSTATE: ICD-10-CM

## 2021-10-19 DIAGNOSIS — Z98.1 ARTHRODESIS STATUS: ICD-10-CM

## 2021-10-19 DIAGNOSIS — M48.07 SPINAL STENOSIS, LUMBOSACRAL REGION: ICD-10-CM

## 2021-10-19 DIAGNOSIS — Z80.6 FAMILY HISTORY OF LEUKEMIA: ICD-10-CM

## 2021-10-19 PROCEDURE — 72110 X-RAY EXAM L-2 SPINE 4/>VWS: CPT | Mod: 26

## 2021-10-19 PROCEDURE — 72110 X-RAY EXAM L-2 SPINE 4/>VWS: CPT

## 2021-10-19 PROCEDURE — 99024 POSTOP FOLLOW-UP VISIT: CPT

## 2021-10-19 NOTE — REASON FOR VISIT
[Spouse] : spouse [de-identified] : L4/S1 laminectomy, L4-S1 TLIF, L3-S2AI posterior fusion [de-identified] : 7/26/2021 [de-identified] : 11 [de-identified] : He reports persistent R hip pain which has not been improving with steroid injection. He is in plan for possible hisp arthroplasty with Dr. Rao after lumbar spine clearance. He reports his preop back pain has been markedly improving and currently doing home exercise and would like to return to work at the end of this month. He denies any other new/worsening focal neuro deficits.

## 2021-10-19 NOTE — ASSESSMENT
[FreeTextEntry1] : Images were reviewed by Dr. Guillory today. stable interbody and posterior fusion.\par \par PLAN\par - neurosurgically cleared for hip arthroplasty\par - RTC in 3 months (Xray 4 views at that time)

## 2021-10-19 NOTE — HISTORY OF PRESENT ILLNESS
[FreeTextEntry1] : INITIAL VISIT (6/29/2021)\par The patient is a 56-year-old man who is in back pain for several years it is worsened recently and he has had shooting pain going down his right leg. He has tried physical therapy and epidural steroid injections as well as medications with no relief He states the symptoms are worsening. The patient mentions the symptoms started 2 year(s) ago. His symptoms occur while walking, sitting, standing and lifting. \par Modifying factors - worsened by bending, worsened by prolonged standing and worsened by walking. Relieving factors include relieved by recumbency and relieved by rest, but not relieved by exercise regimen, not relieved by nonsteroidal anti-inflammatory drugs and not relieved by physical therapy. \par \par He underwent elective lumbar surgery\par \par Hospital Course: \par 7/26: POD0 L4-S1 TLIF, L3-pelvic fusion\par 7/27: POD1, Nick, hypotensive overnight s/p 500 cc bolus. STAT CBC o/n for high drain output in setting of low SBP and bradycardia, Hgb stable. neuro stable. F/u lopez removal. Standing xrays before discharge \par 7/28: POD 2. CAREY o/n. \par 7/29: POD 3. \par 7/30: POD 4. HMV and EDGAR remain. Hgb downtrending, monitor. F/U when to resume eliquis/ASA. Needs BM \par 7/31: POD5. CAREY o/n, neuro stable. h/h stable\par 8/1: POD6. CAREY o/n, neuro stable. \par \par He was discharged to home on 8/1.\par \par POST OP visit (8/10/2021)\par He c/o intermittent worsening b/l LE numbness/shooting pain after walking. Also he noticed some urinary incontinence for the past one week. He denies fever/chills, weakness, difficulty walking. He ambulates with cane. He was seen by plastic sx for surgical incision management, planned for suture removal in next week.\par \par 6 weeks post op visit (9/7/2021)\par Today he returns c/o worsening RLE pain. Pain is described as moderated burning sensation on his R groin radiating down to his leg/bottom of foot during ambulation. Sitting/resting relieve his symptoms. Currently taking gabapentin 300mg TID, robanxin without improvement. He continues to ambulate with cane. His urinary symptoms improving and has upcoming appointment with urology in couple of weeks. He denies any other new/worsening focal neuro deficits.

## 2021-10-19 NOTE — PHYSICAL EXAM
[General Appearance - Alert] : alert [General Appearance - In No Acute Distress] : in no acute distress [General Appearance - Well Nourished] : well nourished [General Appearance - Well-Appearing] : healthy appearing [Longitudinal] : longitudinal [Well-Healed] : well-healed [No Drainage] : without drainage [Normal Skin] : normal [Oriented To Time, Place, And Person] : oriented to person, place, and time [Impaired Insight] : insight and judgment were intact [Affect] : the affect was normal [Memory Recent] : recent memory was not impaired [Person] : oriented to person [Place] : oriented to place [Time] : oriented to time [Abnormal Walk] : normal gait [Limited Balance] : the patient's balance was impaired [FreeTextEntry1] : lower back

## 2021-12-06 ENCOUNTER — OUTPATIENT (OUTPATIENT)
Dept: OUTPATIENT SERVICES | Facility: HOSPITAL | Age: 56
LOS: 1 days | End: 2021-12-06
Payer: COMMERCIAL

## 2021-12-06 ENCOUNTER — RESULT REVIEW (OUTPATIENT)
Age: 56
End: 2021-12-06

## 2021-12-06 ENCOUNTER — APPOINTMENT (OUTPATIENT)
Dept: ORTHOPEDIC SURGERY | Facility: CLINIC | Age: 56
End: 2021-12-06
Payer: COMMERCIAL

## 2021-12-06 DIAGNOSIS — Z90.79 ACQUIRED ABSENCE OF OTHER GENITAL ORGAN(S): Chronic | ICD-10-CM

## 2021-12-06 DIAGNOSIS — Z01.818 ENCOUNTER FOR OTHER PREPROCEDURAL EXAMINATION: ICD-10-CM

## 2021-12-06 LAB
ALBUMIN SERPL ELPH-MCNC: 4.7 G/DL — SIGNIFICANT CHANGE UP (ref 3.3–5)
ALP SERPL-CCNC: 75 U/L — SIGNIFICANT CHANGE UP (ref 40–120)
ALT FLD-CCNC: 25 U/L — SIGNIFICANT CHANGE UP (ref 10–45)
ANION GAP SERPL CALC-SCNC: 14 MMOL/L — SIGNIFICANT CHANGE UP (ref 5–17)
ANISOCYTOSIS BLD QL: SLIGHT — SIGNIFICANT CHANGE UP
APPEARANCE UR: CLEAR — SIGNIFICANT CHANGE UP
APTT BLD: 32.4 SEC — SIGNIFICANT CHANGE UP (ref 27.5–35.5)
AST SERPL-CCNC: 22 U/L — SIGNIFICANT CHANGE UP (ref 10–40)
BACTERIA # UR AUTO: SIGNIFICANT CHANGE UP /HPF
BASOPHILS # BLD AUTO: 0 K/UL — SIGNIFICANT CHANGE UP (ref 0–0.2)
BASOPHILS NFR BLD AUTO: 0 % — SIGNIFICANT CHANGE UP (ref 0–2)
BILIRUB SERPL-MCNC: 0.3 MG/DL — SIGNIFICANT CHANGE UP (ref 0.2–1.2)
BILIRUB UR-MCNC: NEGATIVE — SIGNIFICANT CHANGE UP
BUN SERPL-MCNC: 16 MG/DL — SIGNIFICANT CHANGE UP (ref 7–23)
CALCIUM SERPL-MCNC: 9.9 MG/DL — SIGNIFICANT CHANGE UP (ref 8.4–10.5)
CHLORIDE SERPL-SCNC: 105 MMOL/L — SIGNIFICANT CHANGE UP (ref 96–108)
CO2 SERPL-SCNC: 24 MMOL/L — SIGNIFICANT CHANGE UP (ref 22–31)
COLOR SPEC: YELLOW — SIGNIFICANT CHANGE UP
CREAT SERPL-MCNC: 1.2 MG/DL — SIGNIFICANT CHANGE UP (ref 0.5–1.3)
DACRYOCYTES BLD QL SMEAR: SLIGHT — SIGNIFICANT CHANGE UP
DIFF PNL FLD: NEGATIVE — SIGNIFICANT CHANGE UP
EOSINOPHIL # BLD AUTO: 0 K/UL — SIGNIFICANT CHANGE UP (ref 0–0.5)
EOSINOPHIL NFR BLD AUTO: 0 % — SIGNIFICANT CHANGE UP (ref 0–6)
EPI CELLS # UR: SIGNIFICANT CHANGE UP /HPF (ref 0–5)
GLUCOSE SERPL-MCNC: 78 MG/DL — SIGNIFICANT CHANGE UP (ref 70–99)
GLUCOSE UR QL: NEGATIVE — SIGNIFICANT CHANGE UP
HCT VFR BLD CALC: 45.2 % — SIGNIFICANT CHANGE UP (ref 39–50)
HGB BLD-MCNC: 13.4 G/DL — SIGNIFICANT CHANGE UP (ref 13–17)
INR BLD: 0.96 — SIGNIFICANT CHANGE UP (ref 0.88–1.16)
KETONES UR-MCNC: NEGATIVE — SIGNIFICANT CHANGE UP
LEUKOCYTE ESTERASE UR-ACNC: NEGATIVE — SIGNIFICANT CHANGE UP
LYMPHOCYTES # BLD AUTO: 2.12 K/UL — SIGNIFICANT CHANGE UP (ref 1–3.3)
LYMPHOCYTES # BLD AUTO: 32.1 % — SIGNIFICANT CHANGE UP (ref 13–44)
MANUAL SMEAR VERIFICATION: SIGNIFICANT CHANGE UP
MCHC RBC-ENTMCNC: 18.9 PG — LOW (ref 27–34)
MCHC RBC-ENTMCNC: 29.6 GM/DL — LOW (ref 32–36)
MCV RBC AUTO: 63.7 FL — LOW (ref 80–100)
MICROCYTES BLD QL: SLIGHT — SIGNIFICANT CHANGE UP
MONOCYTES # BLD AUTO: 0.3 K/UL — SIGNIFICANT CHANGE UP (ref 0–0.9)
MONOCYTES NFR BLD AUTO: 4.5 % — SIGNIFICANT CHANGE UP (ref 2–14)
NEUTROPHILS # BLD AUTO: 4.18 K/UL — SIGNIFICANT CHANGE UP (ref 1.8–7.4)
NEUTROPHILS NFR BLD AUTO: 63.4 % — SIGNIFICANT CHANGE UP (ref 43–77)
NITRITE UR-MCNC: NEGATIVE — SIGNIFICANT CHANGE UP
OVALOCYTES BLD QL SMEAR: SIGNIFICANT CHANGE UP
PH UR: 5.5 — SIGNIFICANT CHANGE UP (ref 5–8)
PLAT MORPH BLD: ABNORMAL
PLATELET # BLD AUTO: 217 K/UL — SIGNIFICANT CHANGE UP (ref 150–400)
POIKILOCYTOSIS BLD QL AUTO: SIGNIFICANT CHANGE UP
POLYCHROMASIA BLD QL SMEAR: SLIGHT — SIGNIFICANT CHANGE UP
POTASSIUM SERPL-MCNC: 4.9 MMOL/L — SIGNIFICANT CHANGE UP (ref 3.5–5.3)
POTASSIUM SERPL-SCNC: 4.9 MMOL/L — SIGNIFICANT CHANGE UP (ref 3.5–5.3)
PROT SERPL-MCNC: 7.1 G/DL — SIGNIFICANT CHANGE UP (ref 6–8.3)
PROT UR-MCNC: ABNORMAL MG/DL
PROTHROM AB SERPL-ACNC: 11.5 SEC — SIGNIFICANT CHANGE UP (ref 10.6–13.6)
RBC # BLD: 7.1 M/UL — HIGH (ref 4.2–5.8)
RBC # FLD: 18.3 % — HIGH (ref 10.3–14.5)
RBC BLD AUTO: ABNORMAL
RBC CASTS # UR COMP ASSIST: < 5 /HPF — SIGNIFICANT CHANGE UP
SODIUM SERPL-SCNC: 143 MMOL/L — SIGNIFICANT CHANGE UP (ref 135–145)
SP GR SPEC: >=1.03 — SIGNIFICANT CHANGE UP (ref 1–1.03)
UROBILINOGEN FLD QL: 0.2 E.U./DL — SIGNIFICANT CHANGE UP
WBC # BLD: 6.59 K/UL — SIGNIFICANT CHANGE UP (ref 3.8–10.5)
WBC # FLD AUTO: 6.59 K/UL — SIGNIFICANT CHANGE UP (ref 3.8–10.5)
WBC UR QL: < 5 /HPF — SIGNIFICANT CHANGE UP

## 2021-12-06 PROCEDURE — 71046 X-RAY EXAM CHEST 2 VIEWS: CPT | Mod: 26

## 2021-12-06 PROCEDURE — 93005 ELECTROCARDIOGRAM TRACING: CPT

## 2021-12-06 PROCEDURE — 99213 OFFICE O/P EST LOW 20 MIN: CPT

## 2021-12-06 PROCEDURE — 85025 COMPLETE CBC W/AUTO DIFF WBC: CPT

## 2021-12-06 PROCEDURE — 87086 URINE CULTURE/COLONY COUNT: CPT

## 2021-12-06 PROCEDURE — 83036 HEMOGLOBIN GLYCOSYLATED A1C: CPT

## 2021-12-06 PROCEDURE — 73502 X-RAY EXAM HIP UNI 2-3 VIEWS: CPT

## 2021-12-06 PROCEDURE — 93010 ELECTROCARDIOGRAM REPORT: CPT | Mod: NC

## 2021-12-06 PROCEDURE — 73502 X-RAY EXAM HIP UNI 2-3 VIEWS: CPT | Mod: 26,RT

## 2021-12-06 PROCEDURE — 81001 URINALYSIS AUTO W/SCOPE: CPT

## 2021-12-06 PROCEDURE — 80053 COMPREHEN METABOLIC PANEL: CPT

## 2021-12-06 PROCEDURE — 85610 PROTHROMBIN TIME: CPT

## 2021-12-06 PROCEDURE — 71046 X-RAY EXAM CHEST 2 VIEWS: CPT

## 2021-12-06 PROCEDURE — 85730 THROMBOPLASTIN TIME PARTIAL: CPT

## 2021-12-06 RX ORDER — TADALAFIL 5 MG/1
5 TABLET ORAL
Qty: 30 | Refills: 3 | Status: DISCONTINUED | COMMUNITY
Start: 2021-04-20 | End: 2021-12-06

## 2021-12-06 RX ORDER — OXYCODONE 5 MG/1
5 TABLET ORAL
Refills: 0 | Status: DISCONTINUED | COMMUNITY
End: 2021-12-06

## 2021-12-06 RX ORDER — METHOCARBAMOL 750 MG/1
750 TABLET, FILM COATED ORAL
Refills: 0 | Status: DISCONTINUED | COMMUNITY
End: 2021-12-06

## 2021-12-06 RX ORDER — AMOXICILLIN AND CLAVULANATE POTASSIUM 875; 125 MG/1; MG/1
875-125 TABLET, COATED ORAL
Qty: 4 | Refills: 0 | Status: DISCONTINUED | COMMUNITY
Start: 2021-04-16 | End: 2021-12-06

## 2021-12-06 RX ORDER — TADALAFIL 20 MG/1
20 TABLET ORAL
Qty: 6 | Refills: 12 | Status: DISCONTINUED | COMMUNITY
Start: 2021-09-16 | End: 2021-12-06

## 2021-12-06 RX ORDER — CIPROFLOXACIN HYDROCHLORIDE 500 MG/1
500 TABLET, FILM COATED ORAL
Qty: 8 | Refills: 0 | Status: DISCONTINUED | COMMUNITY
Start: 2020-10-20 | End: 2021-12-06

## 2021-12-06 NOTE — PHYSICAL EXAM
[de-identified] : General appearance: well nourished and hydrated, pleasant, alert and oriented x 3, cooperative.  \par HEENT: normocephalic, EOM intact, wearing mask, external auditory canal clear.  \par Cardiovascular: no lower leg edema, no varicosities, dorsalis pedis pulses palpable and symmetric.  \par Lymphatics: no palpable lymphadenopathy, no lymphedema.  \par Neurologic: sensation is normal, no muscle weakness in upper or lower extremities, patella tendon reflexes present and symmetric.  \par Dermatologic: skin moist, warm, no rash.  \par Spine: cervical spine with normal lordosis and painless range of motion, thoracic spine with normal kyphosis and painless range of motion, lumbosacral spine with normal lordosis and stiff uncomfortable range of motion.  Midline lumbar incision now healed aside from one 2mm circular scab at the lumbosacral junction; no drainage, fluctuance, or induration; no surrounding erythema.\par Gait: slow to stand and get started. Right antalgia and limp without assistive device.\par \par Limb lengths: clinically similar\par \par Left hip:\par - Swelling: none\par - Ecchymosis: none\par - Erythema: none\par - Wounds: none\par - Tenderness: none\par - ROM: 110 flexion, 0 extension, 10 adduction, 40 abduction, 15 internal rotation, 75 external rotation\par - KARYN: painless\par - FADIR: painless\par - Yaritza: negative\par - Stinchfield: negative\par - Flexor power: 5/5\par - Abductor power: 5/5\par \par Right hip:\par - Swelling: none\par - Ecchymosis: none\par - Erythema: none\par - Wounds: none\par - Tenderness: mild anterior\par - ROM: 90 flexion, 0 extension, 0 adduction, 30 abduction, 5-10 obligate external rotation, 40 further external rotation\par - KARYN: painful\par - FADIR: painful\par - Yaritza: positive\par - Stinchfield: positive\par - Flexor power: 4/5\par - Abductor power: 4+/5 [de-identified] : Weightbearing AP pelvis, and 2 additional views (frog lateral and false profile) of the right hip were interpreted by me and reviewed with the patient.\par \par Location of imaging: [facility]\par Date of exam: 12/6/21\par \par Pelvic alignment: relative outlet\par \par Right hip --\par Alignment: normal\par Arthritis: severe\par Deformity: none\par Osteonecrosis: primary vs. secondary sclerotic and cystic change\par \par Left hip --\par Alignment: normal\par Arthritis: moderate\par Deformity: none\par Osteonecrosis: none\par

## 2021-12-06 NOTE — DISCUSSION/SUMMARY
[de-identified] : 55y/o male with right hip osteoarthritis\par - I am not concerned at this point with the appearance of his lumbar incision. OK to continue with R OSBALDO as previously scheduled on 12/30/21\par - We discussed the details of the procedure, the expected recovery period, and the expected outcome. We discussed the likelihood of satisfaction after complete recovery, and the potential causes of dissatisfaction. The importance of active patient participation in the rehabilitation protocol was emphasized, along with its influence on short and long-term outcomes. We discussed the risks, benefits, and alternatives of surgery at length. Specific risks of total hip replacement were discussed in detail. We discussed the risk of surgical site complications including but not limited to: surgical site infection, wound healing complications, bone fracture, tendon or ligament injury, neurovascular injury, hemorrhage, postoperative stiffness or instability, persistent pain, limb length discrepancy, and need for reoperation. We discussed surgical blood loss and the possible need for blood transfusion. We discussed the risk of perioperative medical complications, including but not limited to catheter-associated urinary tract infection, venous thromboembolism and other cardiopulmonary complications. We discussed anesthetic options and the risk of anesthesia-related complications. We discussed the potential benefits of surgery including the potential to improve the current clinical condition through operative intervention. I emphasized that there are alternatives to surgical intervention including continued conservative management, though such a course could yield less than optimal results in this particular patient. A model was used to demonstrate the operation and to discuss bearing surfaces of the implants. We discussed implant fixation methods; my plan would be to use fully cementless fixation in this case. We discussed the various surgical approaches to the hip; I think that an anterior approach would be appropriate in this case. We discussed the durability of prosthetic hips and limitations related to wear, osteolysis and loosening. All questions were answered to the patient's satisfaction. The patient was given a copy of my preoperative packet with additional information about the procedure. I asked the patient to either call back or schedule a followup appointment for any additional questions or concerns regarding the procedure.\par - Cont HEP\par - Tylenol + meloxicam as well as pregabalin as needed\par - Cont wound care for the low back incision as per Plastics and Spine\par - Standard preoperative medical clearance

## 2021-12-06 NOTE — HISTORY OF PRESENT ILLNESS
[de-identified] : 12/6/21: Presenting back for wound check of the lumbar spine as requested by me. Right hip symptoms about the same as prior. Has some painless hours but overall feels highly limited on a day to day basis and is looking forward to the OSBALDO planned for 12/30/21. Notes some weight gain over the past several months despite what he believes is a healthy diet.\par \par 9/20/21: 57y/o male referred by Dr. Guillory for right hip pain progressive for several months. 50% pain relief from right hip CSI on 9/16/21. S/p lumbar fusion, doing well with respect to back pain and leg weakness but persistent wound healing issues with multiple dry scabs; following with Dr. Salcedo for this. Also recently underwent prostate surgery for CA. Takes only pregabalin for pain. Has not been doing any PT. Ambulatory tolerance was less than a block with cane prior to the CSI; now maybe a block and a half without the cane. Previously was very active and is very frustrated by his inability to get back to his prior level of activity.\par \par PMH includes prostate CA as above as well as depression.

## 2021-12-07 LAB
CULTURE RESULTS: SIGNIFICANT CHANGE UP
SPECIMEN SOURCE: SIGNIFICANT CHANGE UP

## 2021-12-13 ENCOUNTER — RX RENEWAL (OUTPATIENT)
Age: 56
End: 2021-12-13

## 2021-12-13 ENCOUNTER — APPOINTMENT (OUTPATIENT)
Dept: ORTHOPEDIC SURGERY | Facility: CLINIC | Age: 56
End: 2021-12-13

## 2021-12-28 RX ORDER — POVIDONE-IODINE 5 %
1 AEROSOL (ML) TOPICAL ONCE
Refills: 0 | Status: COMPLETED | OUTPATIENT
Start: 2021-12-30 | End: 2021-12-30

## 2021-12-28 RX ORDER — CHLORHEXIDINE GLUCONATE 213 G/1000ML
1 SOLUTION TOPICAL ONCE
Refills: 0 | Status: COMPLETED | OUTPATIENT
Start: 2021-12-30 | End: 2021-12-30

## 2021-12-28 RX ORDER — ACETAMINOPHEN 500 MG
1000 TABLET ORAL ONCE
Refills: 0 | Status: COMPLETED | OUTPATIENT
Start: 2021-12-30 | End: 2021-12-30

## 2021-12-28 RX ORDER — CELECOXIB 200 MG/1
400 CAPSULE ORAL ONCE
Refills: 0 | Status: COMPLETED | OUTPATIENT
Start: 2021-12-30 | End: 2021-12-30

## 2021-12-28 RX ORDER — APREPITANT 80 MG/1
40 CAPSULE ORAL ONCE
Refills: 0 | Status: COMPLETED | OUTPATIENT
Start: 2021-12-30 | End: 2021-12-30

## 2021-12-28 NOTE — H&P ADULT - NSHPLABSRESULTS_GEN_ALL_CORE
Preop CBC, BMP, PT/PTT/INR, UA - WNL per medical clearance   Cr 1.20   Preop EKG - NSR - WNL per medical clearance   3M DOS  Covid swab pending Preop CBC, BMP, PT/PTT/INR, UA - WNL per medical clearance   Cr 1.20   Preop EKG - NSR - WNL per medical clearance   Echo 7/15/21 WNL   3M DOS  Covid swab pending

## 2021-12-28 NOTE — H&P ADULT - NSHPPHYSICALEXAM_GEN_ALL_CORE
MSK: + decreased ROM 2/2 pain, right hip        Remainder of exam per medical clearance note MSK: Bilateral lower extremities skin intact, no erythema/ecchymosis/sts. SLT INTACT, DP/PT 2+, EHL/TA/GS 5/5 + decreased ROM 2/2 pain, right hip    Remainder of exam per medical clearance note

## 2021-12-28 NOTE — H&P ADULT - NSICDXPASTMEDICALHX_GEN_ALL_CORE_FT
PAST MEDICAL HISTORY:  Anxiety     Back pain     COVID-19 march 2020    Dyslipidemia     Moderate obesity     Nephrolithiasis     Prostate cancer      PAST MEDICAL HISTORY:  Aneurysm of ascending aorta     Anxiety     Back pain     Bicuspid aortic valve     COVID-19 march 2020    Dyslipidemia     H/O: HTN (hypertension)     Moderate obesity     Multilevel degenerative disc disease     Nephrolithiasis     Osteoarthritis     Prostate cancer

## 2021-12-28 NOTE — H&P ADULT - NSICDXPASTSURGICALHX_GEN_ALL_CORE_FT
PAST SURGICAL HISTORY:  H/O prostatectomy april 2021     PAST SURGICAL HISTORY:  H/O prostatectomy april 2021    History of back surgery 2021

## 2021-12-28 NOTE — H&P ADULT - PROBLEM SELECTOR PLAN 1
Admit to Orthopaedic Service.  Presents today for elective Right total hip replacement   Pt medically stable and cleared for procedure today by Dr. Anne and Dr. Negron

## 2021-12-28 NOTE — H&P ADULT - HISTORY OF PRESENT ILLNESS
56M c/o right hip pain x       Present for elective right total hip replacement  56M c/o right hip pain since this summer. Pt. states he had spine surgery 7/21 and was still having right hip pain. Pt. had xrays performed which showed severe OA. Pt. c/o right hip pain with radiation to his groin and front of his knee. Pt. has increased pain with walking, but takes meloxicam for pain relief. Pt. has tried conservative treatments including right hip injection which made him feel 30% better.       Present for elective right total hip replacement

## 2021-12-29 ENCOUNTER — TRANSCRIPTION ENCOUNTER (OUTPATIENT)
Age: 56
End: 2021-12-29

## 2021-12-29 VITALS
DIASTOLIC BLOOD PRESSURE: 90 MMHG | OXYGEN SATURATION: 97 % | WEIGHT: 225.31 LBS | HEIGHT: 69 IN | HEART RATE: 63 BPM | TEMPERATURE: 97 F | RESPIRATION RATE: 16 BRPM | SYSTOLIC BLOOD PRESSURE: 155 MMHG

## 2021-12-29 NOTE — PATIENT PROFILE ADULT - LEGAL HELP
Per pharmacy there was no changes to dosage, sig or quantity.  The medication(s) are set up as pending and waiting for your approval.  Preferred pharmacy was set up and verified.    
Refill request for the following medications approved and eprescribed to preferred pharmacy per protocol.     insulin glargine (LANTUS SOLOSTAR) 100 UNIT/ML pen-injector  levothyroxine (SYNTHROID, LEVOTHROID) 175 MCG tablet  insulin lispro (HUMALOG KWIKPEN) 100 UNIT/ML pen-injector  simvastatin (ZOCOR) 20 MG tablet  
no

## 2021-12-29 NOTE — PATIENT PROFILE ADULT - FALL HARM RISK - CONCLUSION
Principal Discharge DX:	Diabetic foot ulcer   Universal Safety Interventions Principal Discharge DX:	Diabetic foot ulcer  Secondary Diagnosis:	Pneumonia of left lower lobe due to infectious organism

## 2021-12-29 NOTE — PATIENT PROFILE ADULT - FALL HARM RISK - UNIVERSAL INTERVENTIONS
Bed in lowest position, wheels locked, appropriate side rails in place/Call bell, personal items and telephone in reach/Instruct patient to call for assistance before getting out of bed or chair/Non-slip footwear when patient is out of bed/[place] to call system/Physically safe environment - no spills, clutter or unnecessary equipment/Purposeful Proactive Rounding/Room/bathroom lighting operational, light cord in reach

## 2021-12-30 ENCOUNTER — INPATIENT (INPATIENT)
Facility: HOSPITAL | Age: 56
LOS: 0 days | Discharge: HOME | DRG: 470 | End: 2021-12-31
Attending: ORTHOPAEDIC SURGERY | Admitting: ORTHOPAEDIC SURGERY
Payer: COMMERCIAL

## 2021-12-30 ENCOUNTER — APPOINTMENT (OUTPATIENT)
Dept: ORTHOPEDIC SURGERY | Facility: HOSPITAL | Age: 56
End: 2021-12-30

## 2021-12-30 DIAGNOSIS — M16.11 UNILATERAL PRIMARY OSTEOARTHRITIS, RIGHT HIP: ICD-10-CM

## 2021-12-30 DIAGNOSIS — Z98.890 OTHER SPECIFIED POSTPROCEDURAL STATES: Chronic | ICD-10-CM

## 2021-12-30 DIAGNOSIS — F41.9 ANXIETY DISORDER, UNSPECIFIED: ICD-10-CM

## 2021-12-30 DIAGNOSIS — Z90.79 ACQUIRED ABSENCE OF OTHER GENITAL ORGAN(S): Chronic | ICD-10-CM

## 2021-12-30 DIAGNOSIS — E78.5 HYPERLIPIDEMIA, UNSPECIFIED: ICD-10-CM

## 2021-12-30 PROCEDURE — 27130 TOTAL HIP ARTHROPLASTY: CPT | Mod: RT

## 2021-12-30 RX ORDER — HYDROMORPHONE HYDROCHLORIDE 2 MG/ML
0.5 INJECTION INTRAMUSCULAR; INTRAVENOUS; SUBCUTANEOUS
Refills: 0 | Status: DISCONTINUED | OUTPATIENT
Start: 2021-12-30 | End: 2021-12-31

## 2021-12-30 RX ORDER — BUPIVACAINE 13.3 MG/ML
20 INJECTION, SUSPENSION, LIPOSOMAL INFILTRATION ONCE
Refills: 0 | Status: DISCONTINUED | OUTPATIENT
Start: 2021-12-30 | End: 2021-12-30

## 2021-12-30 RX ORDER — POLYETHYLENE GLYCOL 3350 17 G/17G
17 POWDER, FOR SOLUTION ORAL AT BEDTIME
Refills: 0 | Status: DISCONTINUED | OUTPATIENT
Start: 2021-12-30 | End: 2021-12-31

## 2021-12-30 RX ORDER — HYDROMORPHONE HYDROCHLORIDE 2 MG/ML
0.5 INJECTION INTRAMUSCULAR; INTRAVENOUS; SUBCUTANEOUS EVERY 4 HOURS
Refills: 0 | Status: DISCONTINUED | OUTPATIENT
Start: 2021-12-30 | End: 2021-12-31

## 2021-12-30 RX ORDER — ACETAMINOPHEN 500 MG
975 TABLET ORAL EVERY 8 HOURS
Refills: 0 | Status: DISCONTINUED | OUTPATIENT
Start: 2021-12-30 | End: 2021-12-31

## 2021-12-30 RX ORDER — OXYCODONE HYDROCHLORIDE 5 MG/1
10 TABLET ORAL EVERY 4 HOURS
Refills: 0 | Status: DISCONTINUED | OUTPATIENT
Start: 2021-12-30 | End: 2021-12-31

## 2021-12-30 RX ORDER — SODIUM CHLORIDE 9 MG/ML
1000 INJECTION, SOLUTION INTRAVENOUS
Refills: 0 | Status: DISCONTINUED | OUTPATIENT
Start: 2021-12-30 | End: 2021-12-31

## 2021-12-30 RX ORDER — AMLODIPINE BESYLATE 2.5 MG/1
1 TABLET ORAL
Qty: 0 | Refills: 0 | DISCHARGE

## 2021-12-30 RX ORDER — ONDANSETRON 8 MG/1
8 TABLET, FILM COATED ORAL EVERY 8 HOURS
Refills: 0 | Status: DISCONTINUED | OUTPATIENT
Start: 2021-12-30 | End: 2021-12-31

## 2021-12-30 RX ORDER — OXYCODONE HYDROCHLORIDE 5 MG/1
5 TABLET ORAL EVERY 4 HOURS
Refills: 0 | Status: DISCONTINUED | OUTPATIENT
Start: 2021-12-30 | End: 2021-12-31

## 2021-12-30 RX ORDER — MAGNESIUM HYDROXIDE 400 MG/1
30 TABLET, CHEWABLE ORAL DAILY
Refills: 0 | Status: DISCONTINUED | OUTPATIENT
Start: 2021-12-30 | End: 2021-12-31

## 2021-12-30 RX ORDER — RIVAROXABAN 15 MG-20MG
10 KIT ORAL ONCE
Refills: 0 | Status: DISCONTINUED | OUTPATIENT
Start: 2021-12-31 | End: 2021-12-31

## 2021-12-30 RX ORDER — RIVAROXABAN 15 MG-20MG
10 KIT ORAL
Refills: 0 | Status: DISCONTINUED | OUTPATIENT
Start: 2022-01-01 | End: 2021-12-31

## 2021-12-30 RX ORDER — ESCITALOPRAM OXALATE 10 MG/1
20 TABLET, FILM COATED ORAL DAILY
Refills: 0 | Status: DISCONTINUED | OUTPATIENT
Start: 2021-12-30 | End: 2021-12-30

## 2021-12-30 RX ORDER — SENNA PLUS 8.6 MG/1
2 TABLET ORAL AT BEDTIME
Refills: 0 | Status: DISCONTINUED | OUTPATIENT
Start: 2021-12-30 | End: 2021-12-31

## 2021-12-30 RX ORDER — CEFAZOLIN SODIUM 1 G
2000 VIAL (EA) INJECTION EVERY 8 HOURS
Refills: 0 | Status: COMPLETED | OUTPATIENT
Start: 2021-12-31 | End: 2021-12-31

## 2021-12-30 RX ADMIN — Medication 100 MILLIGRAM(S): at 22:30

## 2021-12-30 RX ADMIN — HYDROMORPHONE HYDROCHLORIDE 0.5 MILLIGRAM(S): 2 INJECTION INTRAMUSCULAR; INTRAVENOUS; SUBCUTANEOUS at 19:21

## 2021-12-30 RX ADMIN — Medication 1000 MILLIGRAM(S): at 14:17

## 2021-12-30 RX ADMIN — CHLORHEXIDINE GLUCONATE 1 APPLICATION(S): 213 SOLUTION TOPICAL at 14:13

## 2021-12-30 RX ADMIN — OXYCODONE HYDROCHLORIDE 10 MILLIGRAM(S): 5 TABLET ORAL at 19:21

## 2021-12-30 RX ADMIN — CELECOXIB 400 MILLIGRAM(S): 200 CAPSULE ORAL at 14:17

## 2021-12-30 RX ADMIN — APREPITANT 40 MILLIGRAM(S): 80 CAPSULE ORAL at 14:17

## 2021-12-30 RX ADMIN — HYDROMORPHONE HYDROCHLORIDE 0.5 MILLIGRAM(S): 2 INJECTION INTRAMUSCULAR; INTRAVENOUS; SUBCUTANEOUS at 19:22

## 2021-12-30 RX ADMIN — Medication 1 APPLICATION(S): at 14:13

## 2021-12-31 ENCOUNTER — TRANSCRIPTION ENCOUNTER (OUTPATIENT)
Age: 56
End: 2021-12-31

## 2021-12-31 VITALS
TEMPERATURE: 98 F | DIASTOLIC BLOOD PRESSURE: 80 MMHG | SYSTOLIC BLOOD PRESSURE: 149 MMHG | RESPIRATION RATE: 18 BRPM | HEART RATE: 57 BPM | OXYGEN SATURATION: 96 %

## 2021-12-31 LAB
ANION GAP SERPL CALC-SCNC: 9 MMOL/L — SIGNIFICANT CHANGE UP (ref 5–17)
BUN SERPL-MCNC: 18 MG/DL — SIGNIFICANT CHANGE UP (ref 7–23)
CALCIUM SERPL-MCNC: 8.9 MG/DL — SIGNIFICANT CHANGE UP (ref 8.4–10.5)
CHLORIDE SERPL-SCNC: 104 MMOL/L — SIGNIFICANT CHANGE UP (ref 96–108)
CO2 SERPL-SCNC: 21 MMOL/L — LOW (ref 22–31)
CREAT SERPL-MCNC: 1.14 MG/DL — SIGNIFICANT CHANGE UP (ref 0.5–1.3)
GLUCOSE BLDC GLUCOMTR-MCNC: 173 MG/DL — HIGH (ref 70–99)
GLUCOSE SERPL-MCNC: 162 MG/DL — HIGH (ref 70–99)
HCT VFR BLD CALC: 36.6 % — LOW (ref 39–50)
HGB BLD-MCNC: 10.7 G/DL — LOW (ref 13–17)
MCHC RBC-ENTMCNC: 18.3 PG — LOW (ref 27–34)
MCHC RBC-ENTMCNC: 29.2 GM/DL — LOW (ref 32–36)
MCV RBC AUTO: 62.6 FL — LOW (ref 80–100)
NRBC # BLD: 0 /100 WBCS — SIGNIFICANT CHANGE UP (ref 0–0)
PLATELET # BLD AUTO: 194 K/UL — SIGNIFICANT CHANGE UP (ref 150–400)
POTASSIUM SERPL-MCNC: 4.5 MMOL/L — SIGNIFICANT CHANGE UP (ref 3.5–5.3)
POTASSIUM SERPL-SCNC: 4.5 MMOL/L — SIGNIFICANT CHANGE UP (ref 3.5–5.3)
RBC # BLD: 5.85 M/UL — HIGH (ref 4.2–5.8)
RBC # FLD: 16 % — HIGH (ref 10.3–14.5)
SODIUM SERPL-SCNC: 134 MMOL/L — LOW (ref 135–145)
WBC # BLD: 10.66 K/UL — HIGH (ref 3.8–10.5)
WBC # FLD AUTO: 10.66 K/UL — HIGH (ref 3.8–10.5)

## 2021-12-31 PROCEDURE — 99222 1ST HOSP IP/OBS MODERATE 55: CPT

## 2021-12-31 RX ORDER — MELOXICAM 15 MG/1
0 TABLET ORAL
Qty: 0 | Refills: 0 | DISCHARGE

## 2021-12-31 RX ORDER — ESCITALOPRAM OXALATE 10 MG/1
1 TABLET, FILM COATED ORAL
Qty: 1 | Refills: 0
Start: 2021-12-31 | End: 2022-01-29

## 2021-12-31 RX ORDER — RIVAROXABAN 15 MG-20MG
1 KIT ORAL
Qty: 0 | Refills: 0 | DISCHARGE
Start: 2021-12-31

## 2021-12-31 RX ORDER — ACETAMINOPHEN 500 MG
3 TABLET ORAL
Qty: 63 | Refills: 0
Start: 2021-12-31 | End: 2022-01-06

## 2021-12-31 RX ORDER — ESCITALOPRAM OXALATE 10 MG/1
1 TABLET, FILM COATED ORAL
Qty: 0 | Refills: 0 | DISCHARGE

## 2021-12-31 RX ADMIN — Medication 975 MILLIGRAM(S): at 06:01

## 2021-12-31 RX ADMIN — Medication 975 MILLIGRAM(S): at 07:38

## 2021-12-31 RX ADMIN — Medication 100 MILLIGRAM(S): at 07:38

## 2021-12-31 RX ADMIN — Medication 975 MILLIGRAM(S): at 14:09

## 2021-12-31 NOTE — DISCHARGE NOTE NURSING/CASE MANAGEMENT/SOCIAL WORK - NSDCPEFALRISK_GEN_ALL_CORE
For information on Fall & Injury Prevention, visit: [email or web address] OR  [email or web address] OR  https://www.cdc.gov/steadi/patient.html

## 2021-12-31 NOTE — PROGRESS NOTE ADULT - SUBJECTIVE AND OBJECTIVE BOX
Ortho Post Op Check    Procedure: R OSBALDO  Surgeon: Dr. MARGARET Rao    Subjective:  Pain controlled with medication.  Denies CP, SOB, N/V, numbness/tingling.    Objective:  Vital Signs Last 24 Hrs  T(C): --  T(F): --  HR: 44 (12-30-21 @ 19:51) (44 - 63)  BP: 121/70 (12-30-21 @ 19:51) (119/69 - 145/82)  BP(mean): 90 (12-30-21 @ 19:51) (89 - 106)  RR: --  SpO2: 98% (12-30-21 @ 19:51) (93% - 100%)  AVSS    General: Pt Alert and oriented, NAD  RLE:  Dressing C/D/I  Motor: 5/5 EHL/FHL/TA/GS b/l  Sensation: SILT throughout all nerve distributions  Pulses: Toes WWP    Intra-op X-Ray: s/p OSBALDO, components well-fixed in proper alignment.    A/P: 56yMale s/p R OSBALDO on 12-30.  - Stable  - Pain Control  - DVT ppx: SCDs, Xarelto 10mg daily  - Post op abx: Ancef 2g q8h x2 postoperative doses  - Resume home meds  - PT, WBS: WBAT    Ortho Pager [phone]
Ortho Floor Note    Subjective:  Pain controlled with medication.  Denies CP, SOB, N/V, numbness/tingling.    Objective:  Vital Signs Last 24 Hrs  T(C): 37 (31 Dec 2021 05:34), Max: 37 (31 Dec 2021 05:34)  T(F): 98.6 (31 Dec 2021 05:34), Max: 98.6 (31 Dec 2021 05:34)  HR: 70 (31 Dec 2021 05:34) (44 - 72)  BP: 123/78 (31 Dec 2021 05:34) (113/64 - 145/82)  BP(mean): 78 (31 Dec 2021 05:34) (70 - 106)  RR: 20 (31 Dec 2021 05:34) (18 - 20)  SpO2: 98% (31 Dec 2021 05:34) (93% - 100%)    General: Pt Alert and oriented, NAD  RLE:  Dressing C/D/I  Motor: 5/5 EHL/FHL/TA/GS b/l  Sensation: SILT throughout all nerve distributions  Pulses: Toes WWP    A/P: 56yMale s/p R OSBALDO on 12-30.  - Stable  - Pain Control  - DVT ppx: SCDs, Xarelto 10mg daily (PEPPER study)  - Post op abx: Ancef 2g q8h x2 postoperative doses  - Resume home meds  - PT, WBS: WBAT  - Dispo: home when clears    Ortho Pager [phone]

## 2021-12-31 NOTE — CONSULT NOTE ADULT - SUBJECTIVE AND OBJECTIVE BOX
Cardiology Consult    HPI: 56M PMH HTN, severe OA and recent spine surgery in July 2021 presenting for R hip pain, failed conservative therapies and underwent elective R THR on 12/30 without issue. EBL 400cc. Patient reports controlled pain and working well with PT. Denies chest pain, no SOB. No fevers or chills. Denies palpitations or dizziness. Denies dizziness with change in position or while working with PT. States that he was previously told to start amlodipine but never picked up from the pharmacy. Also reports previously being on lexapro over last 3 mo but has self discontinued. Also reports preDM, diet controlled.     OBJECTIVE  Vitals:  T(C): 36.6 (12-31-21 @ 09:57), Max: 37 (12-31-21 @ 05:34)  HR: 57 (12-31-21 @ 09:57) (44 - 72)  BP: 149/80 (12-31-21 @ 09:57) (113/64 - 149/80)  RR: 18 (12-31-21 @ 09:57) (18 - 20)  SpO2: 96% (12-31-21 @ 09:57) (93% - 100%)  Wt(kg): --    I/O:  I&O's Summary    30 Dec 2021 07:01  -  31 Dec 2021 07:00  --------------------------------------------------------  IN: 1120 mL / OUT: 500 mL / NET: 620 mL        PHYSICAL EXAM:  Appearance: NAD. Speaking in full sentences.   HEENT: No pallor noted.  Conjunctiva clear b/l. Moist oral mucosa.  Cardiovascular: RRR with no murmurs.  Respiratory: Lungs CTAB.   Gastrointestinal:  Soft, nontender. Non-distended. Non-rigid.	  Extremities: No edema b/l. No erythema b/l. LE WWP b/l.  Vascular: DP intact  Neurologic:  Alert and awake. Moving all extremities. Following commands.   	  LABS:                        10.7   10.66 )-----------( 194      ( 31 Dec 2021 08:18 )             36.6     12-31    134<L>  |  104  |  18  ----------------------------<  162<H>  4.5   |  21<L>  |  1.14    Ca    8.9      31 Dec 2021 08:18            RADIOLOGY & ADDITIONAL TESTS:  Reviewed .    MEDICATIONS  (STANDING):  acetaminophen     Tablet .. 975 milliGRAM(s) Oral every 8 hours  lactated ringers. 1000 milliLiter(s) (140 mL/Hr) IV Continuous <Continuous>  polyethylene glycol 3350 17 Gram(s) Oral at bedtime  rivaroxaban 10 milliGRAM(s) Oral once  senna 2 Tablet(s) Oral at bedtime    MEDICATIONS  (PRN):  HYDROmorphone  Injectable 0.5 milliGRAM(s) IV Push every 15 minutes PRN breakthrough pain  HYDROmorphone  Injectable 0.5 milliGRAM(s) IV Push every 4 hours PRN breakthrough pain  magnesium hydroxide Suspension 30 milliLiter(s) Oral daily PRN Constipation  ondansetron Injectable 8 milliGRAM(s) IV Push every 8 hours PRN Nausea and/or Vomiting  oxyCODONE    IR 5 milliGRAM(s) Oral every 4 hours PRN Moderate Pain (4 - 6)  oxyCODONE    IR 10 milliGRAM(s) Oral every 4 hours PRN Severe Pain (7 - 10)

## 2021-12-31 NOTE — DISCHARGE NOTE NURSING/CASE MANAGEMENT/SOCIAL WORK - PATIENT PORTAL LINK FT
You can access the FollowMyHealth Patient Portal offered by [facility] by registering at the following website: [email or web address]. By joining [facility]’s FollowMyHealth portal, you will also be able to view your health information using other applications (apps) compatible with our system.

## 2021-12-31 NOTE — PHYSICAL THERAPY INITIAL EVALUATION ADULT - PATIENT/FAMILY/SIGNIFICANT OTHER GOALS STATEMENT, PT EVAL
return to OF

## 2021-12-31 NOTE — DISCHARGE NOTE PROVIDER - NSDCFUSCHEDAPPT_GEN_ALL_CORE_FT
[facility] ; 01/18/2022 ; NPP [facility]urg [address] [facility] ; 03/15/2022 ; NPP Urology [address]

## 2021-12-31 NOTE — DISCHARGE NOTE PROVIDER - CARE PROVIDER_API CALL
Maciel Rao)  Orthopedics  [address] [facility], [place] [address]  Phone: [phone]  Fax: [phone]  Follow Up Time:

## 2021-12-31 NOTE — PHYSICAL THERAPY INITIAL EVALUATION ADULT - PERTINENT HX OF CURRENT PROBLEM, REHAB EVAL
56M c/o right hip pain since this summer. Pt. states he had spine surgery 7/21 and was still having right hip pain. Pt. had xrays performed which showed severe OA. Pt. c/o right hip pain with radiation to his groin and front of his knee. Pt. has increased pain with walking, but takes meloxicam for pain relief. Pt. has tried conservative treatments including right hip injection which made him feel 30% better.

## 2021-12-31 NOTE — DISCHARGE NOTE PROVIDER - HOSPITAL COURSE
Admitted  Surgery R OSBALDO 12/30/21  Bambi-op Antibiotics  Pain control  DVT prophylaxis  OOB/Physical Therapy

## 2021-12-31 NOTE — CONSULT NOTE ADULT - ASSESSMENT
ASSESSMENT:  56M PMH HTN, severe OA and recent spine surgery in July 2021 presenting for R hip pain, failed conservative therapies and underwent elective R THR on 12/30     PLAN:  Post Op State: Pain controlled on current regimen. Encouraged incentive spirometer. On bowel regimen. PPX: VTE- Xarelto  Hypertension: Reports being told to start amlodipine but never picked up from pharmacy. BP currently 120-140s. If persistent >130-140, can consider starting low dose amlodipine. Encouraged patient to follow up with PCP to discuss initiation of antihypertensive therapy.  Depression: Reports self discontinuing Lexapro. No SI/HI. Encouraged follow up with PCP.   Pre-DM: A1c 6.0, reports that has been told he is in pre-diabetic range in past. No active medications, diet controlled.   Obesity: BMI 33.3  Anemia: Asymptomatic, Hgb 10.7, likely 2/2 to post operative losses.     Recommendation:  - Per patient, he is currently taking no antihypertensives or antidepressants.   - Encouraged patient to follow up with PCP upon discharge for further management of medical issues. Post op labs: CBC to monitor HGb,

## 2022-01-03 DIAGNOSIS — I10 ESSENTIAL (PRIMARY) HYPERTENSION: ICD-10-CM

## 2022-01-03 DIAGNOSIS — M16.11 UNILATERAL PRIMARY OSTEOARTHRITIS, RIGHT HIP: ICD-10-CM

## 2022-01-03 DIAGNOSIS — E78.5 HYPERLIPIDEMIA, UNSPECIFIED: ICD-10-CM

## 2022-01-03 DIAGNOSIS — F41.9 ANXIETY DISORDER, UNSPECIFIED: ICD-10-CM

## 2022-01-03 DIAGNOSIS — E66.9 OBESITY, UNSPECIFIED: ICD-10-CM

## 2022-01-03 DIAGNOSIS — R73.03 PREDIABETES: ICD-10-CM

## 2022-01-03 DIAGNOSIS — F32.A DEPRESSION, UNSPECIFIED: ICD-10-CM

## 2022-01-03 DIAGNOSIS — D62 ACUTE POSTHEMORRHAGIC ANEMIA: ICD-10-CM

## 2022-01-06 PROBLEM — Z86.79 PERSONAL HISTORY OF OTHER DISEASES OF THE CIRCULATORY SYSTEM: Chronic | Status: ACTIVE | Noted: 2021-12-29

## 2022-01-06 PROBLEM — M53.9 DORSOPATHY, UNSPECIFIED: Chronic | Status: ACTIVE | Noted: 2021-12-29

## 2022-01-06 PROBLEM — I71.2 THORACIC AORTIC ANEURYSM, WITHOUT RUPTURE: Chronic | Status: ACTIVE | Noted: 2021-12-29

## 2022-01-06 PROBLEM — M19.90 UNSPECIFIED OSTEOARTHRITIS, UNSPECIFIED SITE: Chronic | Status: ACTIVE | Noted: 2021-12-29

## 2022-01-06 PROBLEM — Q23.1 CONGENITAL INSUFFICIENCY OF AORTIC VALVE: Chronic | Status: ACTIVE | Noted: 2021-12-29

## 2022-01-11 ENCOUNTER — APPOINTMENT (OUTPATIENT)
Dept: SPINE | Facility: CLINIC | Age: 57
End: 2022-01-11

## 2022-01-18 ENCOUNTER — APPOINTMENT (OUTPATIENT)
Dept: ORTHOPEDIC SURGERY | Facility: CLINIC | Age: 57
End: 2022-01-18
Payer: COMMERCIAL

## 2022-01-18 VITALS
HEIGHT: 69 IN | DIASTOLIC BLOOD PRESSURE: 80 MMHG | WEIGHT: 220 LBS | SYSTOLIC BLOOD PRESSURE: 134 MMHG | BODY MASS INDEX: 32.58 KG/M2

## 2022-01-18 PROCEDURE — 99024 POSTOP FOLLOW-UP VISIT: CPT

## 2022-01-18 NOTE — HISTORY OF PRESENT ILLNESS
[de-identified] : first post op right total hip replacement, surgical date 12/30/21 [de-identified] : Doing well. Pain controlled with multimodal regimen; has not been using Tylenol, still using about 3x oxys a day. Compliant with Xarelto.  Never started home PT but has been doing HEP. Ambulating without assistive device. No wound or systemic complaints. [de-identified] : R hip dressings removed. Incision well approximated, benign appearing. Ambulating without assistive device, minimally antalgic right. [de-identified] : 55y/o male about 2.5wk s/p R OSBALDO, doing well [de-identified] : Begin outpatient PT\par Cont HEP\par Multimodal pain mgmt, advised to get back on maximal dose of Tylenol and wean oxycodone first as tolerated\par Finish out month of Xarelto as per PEPPER study protocol\par RTC 4wk with new right hip XRs

## 2022-01-31 ENCOUNTER — RESULT REVIEW (OUTPATIENT)
Age: 57
End: 2022-01-31

## 2022-02-03 ENCOUNTER — APPOINTMENT (OUTPATIENT)
Dept: SPINE | Facility: CLINIC | Age: 57
End: 2022-02-03
Payer: COMMERCIAL

## 2022-02-03 ENCOUNTER — OUTPATIENT (OUTPATIENT)
Dept: OUTPATIENT SERVICES | Facility: HOSPITAL | Age: 57
LOS: 1 days | End: 2022-02-03
Payer: COMMERCIAL

## 2022-02-03 VITALS
WEIGHT: 225 LBS | BODY MASS INDEX: 33.33 KG/M2 | TEMPERATURE: 97.3 F | OXYGEN SATURATION: 98 % | HEART RATE: 58 BPM | HEIGHT: 69 IN | DIASTOLIC BLOOD PRESSURE: 83 MMHG | SYSTOLIC BLOOD PRESSURE: 151 MMHG

## 2022-02-03 DIAGNOSIS — Z98.890 OTHER SPECIFIED POSTPROCEDURAL STATES: Chronic | ICD-10-CM

## 2022-02-03 DIAGNOSIS — M43.16 SPONDYLOLISTHESIS, LUMBAR REGION: ICD-10-CM

## 2022-02-03 DIAGNOSIS — Z90.79 ACQUIRED ABSENCE OF OTHER GENITAL ORGAN(S): Chronic | ICD-10-CM

## 2022-02-03 PROCEDURE — 72110 X-RAY EXAM L-2 SPINE 4/>VWS: CPT | Mod: 26

## 2022-02-03 PROCEDURE — 72110 X-RAY EXAM L-2 SPINE 4/>VWS: CPT

## 2022-02-03 PROCEDURE — 99215 OFFICE O/P EST HI 40 MIN: CPT

## 2022-02-03 RX ORDER — PREGABALIN 75 MG/1
75 CAPSULE ORAL
Refills: 0 | Status: DISCONTINUED | COMMUNITY
End: 2022-02-03

## 2022-02-03 RX ORDER — OXYCODONE 5 MG/1
5 TABLET ORAL
Qty: 50 | Refills: 0 | Status: DISCONTINUED | COMMUNITY
Start: 2021-12-30 | End: 2022-02-03

## 2022-02-08 PROBLEM — M43.16 SPONDYLOLISTHESIS OF LUMBAR REGION: Status: ACTIVE | Noted: 2021-06-29

## 2022-02-08 PROCEDURE — 80048 BASIC METABOLIC PNL TOTAL CA: CPT

## 2022-02-08 PROCEDURE — 97140 MANUAL THERAPY 1/> REGIONS: CPT

## 2022-02-08 PROCEDURE — 82962 GLUCOSE BLOOD TEST: CPT

## 2022-02-08 PROCEDURE — 85027 COMPLETE CBC AUTOMATED: CPT

## 2022-02-08 PROCEDURE — 97161 PT EVAL LOW COMPLEX 20 MIN: CPT

## 2022-02-08 PROCEDURE — C1776: CPT

## 2022-02-08 PROCEDURE — 76000 FLUOROSCOPY <1 HR PHYS/QHP: CPT

## 2022-02-08 PROCEDURE — C9399: CPT

## 2022-02-08 PROCEDURE — 36415 COLL VENOUS BLD VENIPUNCTURE: CPT

## 2022-02-08 NOTE — HISTORY OF PRESENT ILLNESS
[de-identified] : Jaxon returns today for his follow-up from his lumbar fusion.  He is doing well and denies any worsening back pain.  He has become more active since his hip surgery.  Overall he is pleased with his outcome.

## 2022-02-08 NOTE — DISCUSSION/SUMMARY
[de-identified] : Jaxon is doing very well.  I reviewed his x-rays personally and there is a question of some haloing of the top screws however he has no worsening back pain and so we are going to observe this at this time.  I have cleared him for activities and physical therapy.  I answered all of his questions and his wife questions to the best my ability.  I will see him back in 6 months.\par \par Orlin Guillory M.D., M.Sc.\[facility] \[facility] Department of Neurosurgery\[facility]\par [facility]\par [facility]\par [place], [place]\par [email or web address]\par [phone]

## 2022-02-15 ENCOUNTER — RESULT REVIEW (OUTPATIENT)
Age: 57
End: 2022-02-15

## 2022-02-15 ENCOUNTER — APPOINTMENT (OUTPATIENT)
Dept: ORTHOPEDIC SURGERY | Facility: CLINIC | Age: 57
End: 2022-02-15
Payer: COMMERCIAL

## 2022-02-15 ENCOUNTER — OUTPATIENT (OUTPATIENT)
Dept: OUTPATIENT SERVICES | Facility: HOSPITAL | Age: 57
LOS: 1 days | End: 2022-02-15
Payer: COMMERCIAL

## 2022-02-15 DIAGNOSIS — Z90.79 ACQUIRED ABSENCE OF OTHER GENITAL ORGAN(S): Chronic | ICD-10-CM

## 2022-02-15 DIAGNOSIS — Z98.890 OTHER SPECIFIED POSTPROCEDURAL STATES: Chronic | ICD-10-CM

## 2022-02-15 PROCEDURE — 99024 POSTOP FOLLOW-UP VISIT: CPT

## 2022-02-15 PROCEDURE — 73502 X-RAY EXAM HIP UNI 2-3 VIEWS: CPT

## 2022-02-15 PROCEDURE — 73502 X-RAY EXAM HIP UNI 2-3 VIEWS: CPT | Mod: 26,RT

## 2022-02-15 NOTE — HISTORY OF PRESENT ILLNESS
[de-identified] : Second post op right total hip replacement, surgical date 12/30/21 [de-identified] : Reports some new right lateral hip pain with radiation to the anterior knee when initiating motion. Specifically denies groin pain. Still participating in PT and doing his own HEP. Taking only Tylenol for pain.  [de-identified] : R hip incision well approximated, benign appearing.  One small loose scab at the midportion of the incision was debrided by me. Ambulating without assistive device, minimally antalgic right. R hip ROM 0-100 flex, 10 ADD, 30 ABD, 10 IR, 45 ER. 5/5 hip flexion, 4/5 hip abduction. Mild pain with palpation of distal ITB. [de-identified] : Right hip XRs taken today demonstrate stable position of the components without evidence of mechanical complication.  [de-identified] : 57y/o male about 6wk s/p R OSBALDO, with some abductor weakness and iliotibial band syndrome [de-identified] : Cont outpatient PT; new referral given\par Cont HEP\par Multimodal pain mgmt with Tylenol and celecoxib\par RTC 2mo with new right hip XRs

## 2022-03-10 ENCOUNTER — TRANSCRIPTION ENCOUNTER (OUTPATIENT)
Age: 57
End: 2022-03-10

## 2022-03-10 LAB — PSA, POST - PROSTATECTOMY: <0.01 NG/ML

## 2022-03-15 ENCOUNTER — APPOINTMENT (OUTPATIENT)
Dept: UROLOGY | Facility: CLINIC | Age: 57
End: 2022-03-15
Payer: COMMERCIAL

## 2022-03-15 VITALS — TEMPERATURE: 97.4 F | WEIGHT: 225 LBS | BODY MASS INDEX: 33.33 KG/M2 | HEIGHT: 69 IN

## 2022-03-15 VITALS — DIASTOLIC BLOOD PRESSURE: 89 MMHG | HEART RATE: 61 BPM | SYSTOLIC BLOOD PRESSURE: 159 MMHG

## 2022-03-15 PROCEDURE — 99213 OFFICE O/P EST LOW 20 MIN: CPT

## 2022-03-15 RX ORDER — TADALAFIL 20 MG/1
20 TABLET ORAL
Qty: 6 | Refills: 0 | Status: ACTIVE | COMMUNITY
Start: 2022-03-15 | End: 1900-01-01

## 2022-03-15 NOTE — HISTORY OF PRESENT ILLNESS
[FreeTextEntry1] : Dr. Reina Anne\par [address] [facility] [address]\par \par \par 56 year old male s/p single port RALP in April 2021\par \par PSA <0.01\par GS7 margins negative\par \par Wearing depends daily\par Minimal leakage with coughing, laughing, or sneezing.  Otherwise great urinary control\par \par \par \par Erections are ok \par Rates them 6 out of 10.  Does report enough got penetration \par Tried Cialis 5 mg with no real effect \par \par

## 2022-03-15 NOTE — ASSESSMENT
[FreeTextEntry1] : 56 year old male who underwent single port radical prostatectomy in April 2021\par -urinary control continues to improve, mild TASH.  Will try pads and discontinue Depends\par -Trial of Cialis 20 mg for ED.\par -Continue with Kegel exercises\par \par \par RTC in 6 months with PSA prior

## 2022-03-15 NOTE — PHYSICAL EXAM
[General Appearance - Well Developed] : well developed [General Appearance - Well Nourished] : well nourished [Normal Appearance] : normal appearance [Well Groomed] : well groomed [General Appearance - In No Acute Distress] : no acute distress [Respiration, Rhythm And Depth] : normal respiratory rhythm and effort [Exaggerated Use Of Accessory Muscles For Inspiration] : no accessory muscle use [Abdomen Soft] : soft [Abdomen Tenderness] : non-tender [Costovertebral Angle Tenderness] : no ~M costovertebral angle tenderness [Normal Station and Gait] : the gait and station were normal for the patient's age [] : no rash [No Focal Deficits] : no focal deficits

## 2022-04-06 ENCOUNTER — APPOINTMENT (OUTPATIENT)
Dept: ORTHOPEDIC SURGERY | Facility: CLINIC | Age: 57
End: 2022-04-06
Payer: COMMERCIAL

## 2022-04-06 ENCOUNTER — OUTPATIENT (OUTPATIENT)
Dept: OUTPATIENT SERVICES | Facility: HOSPITAL | Age: 57
LOS: 1 days | End: 2022-04-06
Payer: COMMERCIAL

## 2022-04-06 ENCOUNTER — RESULT REVIEW (OUTPATIENT)
Age: 57
End: 2022-04-06

## 2022-04-06 VITALS — OXYGEN SATURATION: 98 % | HEART RATE: 58 BPM | SYSTOLIC BLOOD PRESSURE: 146 MMHG | DIASTOLIC BLOOD PRESSURE: 87 MMHG

## 2022-04-06 DIAGNOSIS — Z98.890 OTHER SPECIFIED POSTPROCEDURAL STATES: Chronic | ICD-10-CM

## 2022-04-06 DIAGNOSIS — Z90.79 ACQUIRED ABSENCE OF OTHER GENITAL ORGAN(S): Chronic | ICD-10-CM

## 2022-04-06 PROCEDURE — 99213 OFFICE O/P EST LOW 20 MIN: CPT

## 2022-04-06 PROCEDURE — 73502 X-RAY EXAM HIP UNI 2-3 VIEWS: CPT | Mod: 26,RT

## 2022-04-06 PROCEDURE — 73502 X-RAY EXAM HIP UNI 2-3 VIEWS: CPT

## 2022-04-06 NOTE — DISCUSSION/SUMMARY
[de-identified] : 57y/o male about 3.5mo s/p R OSBALDO, doing well\par - Cont HEP\par - RTC q Dec 2022 with repeat right hip XRs or earlier as needed

## 2022-04-06 NOTE — HISTORY OF PRESENT ILLNESS
[de-identified] : R OSBALDO, 12/30/21\par \par 4/6/22: Doing well. Right hip pain minimal now. Ambulating without assistive device, trying to get his step count up. Doing HEP. \par \par 12/6/21: Presenting back for wound check of the lumbar spine as requested by me. Right hip symptoms about the same as prior. Has some painless hours but overall feels highly limited on a day to day basis and is looking forward to the OSBALDO planned for 12/30/21. Notes some weight gain over the past several months despite what he believes is a healthy diet.\par \par 9/20/21: 55y/o male referred by Dr. Guillory for right hip pain progressive for several months. 50% pain relief from right hip CSI on 9/16/21. S/p lumbar fusion, doing well with respect to back pain and leg weakness but persistent wound healing issues with multiple dry scabs; following with Dr. Salcedo for this. Also recently underwent prostate surgery for CA. Takes only pregabalin for pain. Has not been doing any PT. Ambulatory tolerance was less than a block with cane prior to the CSI; now maybe a block and a half without the cane. Previously was very active and is very frustrated by his inability to get back to his prior level of activity.\par \par PMH includes prostate CA as above as well as depression.

## 2022-04-06 NOTE — PHYSICAL EXAM
[de-identified] : General appearance: well nourished and hydrated, pleasant, alert and oriented x 3, cooperative.  \par HEENT: normocephalic, EOM intact, wearing mask, external auditory canal clear.  \par Cardiovascular: no lower leg edema, no varicosities, dorsalis pedis pulses palpable and symmetric.  \par Lymphatics: no palpable lymphadenopathy, no lymphedema.  \par Neurologic: sensation is normal, no muscle weakness in upper or lower extremities, patella tendon reflexes present and symmetric.  \par Dermatologic: skin moist, warm, no rash.  \par Spine: cervical spine with normal lordosis and painless range of motion, thoracic spine with normal kyphosis and painless range of motion, lumbosacral spine with normal lordosis and stiff uncomfortable range of motion.  \par Gait: normal.\par \par Limb lengths: clinically similar\par \par Left hip:\par - Swelling: none\par - Ecchymosis: none\par - Erythema: none\par - Wounds: none\par - Tenderness: none\par - ROM: 110 flexion, 0 extension, 10 adduction, 40 abduction, 15 internal rotation, 75 external rotation\par - KARYN: painless\par - FADIR: painless\par - Yaritza: negative\par - Stinchfield: negative\par - Flexor power: 5/5\par - Abductor power: 5/5\par \par Right hip:\par - Swelling: none\par - Ecchymosis: none\par - Erythema: none\par - Wounds: healed anterior incision\par - Tenderness: mild toribio-incisional\par - ROM: 100 flexion, 0 extension, 10 adduction, 40 abduction, 10 internal rotation, 45 external rotation\par - KARYN: painless\par - FADIR: painless\par - Yaritza: \par - Stinchfield: negative\par - Flexor power: 4+/5\par - Abductor power: 4+/5 [de-identified] : Right hip XRs taken today demonstrate stable position of the components without evidence of mechanical complication.

## 2022-04-07 ENCOUNTER — RESULT REVIEW (OUTPATIENT)
Age: 57
End: 2022-04-07

## 2022-04-07 ENCOUNTER — NON-APPOINTMENT (OUTPATIENT)
Age: 57
End: 2022-04-07

## 2022-04-07 ENCOUNTER — OUTPATIENT (OUTPATIENT)
Dept: OUTPATIENT SERVICES | Facility: HOSPITAL | Age: 57
LOS: 1 days | End: 2022-04-07
Payer: COMMERCIAL

## 2022-04-07 ENCOUNTER — APPOINTMENT (OUTPATIENT)
Dept: SPINE | Facility: CLINIC | Age: 57
End: 2022-04-07
Payer: COMMERCIAL

## 2022-04-07 VITALS
SYSTOLIC BLOOD PRESSURE: 131 MMHG | OXYGEN SATURATION: 98 % | DIASTOLIC BLOOD PRESSURE: 82 MMHG | RESPIRATION RATE: 18 BRPM | HEART RATE: 57 BPM | HEIGHT: 69 IN | WEIGHT: 225 LBS | BODY MASS INDEX: 33.33 KG/M2

## 2022-04-07 DIAGNOSIS — Z90.79 ACQUIRED ABSENCE OF OTHER GENITAL ORGAN(S): Chronic | ICD-10-CM

## 2022-04-07 DIAGNOSIS — Z98.890 OTHER SPECIFIED POSTPROCEDURAL STATES: Chronic | ICD-10-CM

## 2022-04-07 PROCEDURE — 99215 OFFICE O/P EST HI 40 MIN: CPT

## 2022-04-07 PROCEDURE — 72114 X-RAY EXAM L-S SPINE BENDING: CPT

## 2022-04-07 PROCEDURE — 72114 X-RAY EXAM L-S SPINE BENDING: CPT | Mod: 26

## 2022-04-07 NOTE — HISTORY OF PRESENT ILLNESS
[de-identified] : The patient returns today with complaints of new back pain after physical therapy this is improved but it was concerning enough that we order some new x-rays and had him follow

## 2022-04-07 NOTE — DISCUSSION/SUMMARY
[de-identified] : His x-rays reveal some haloing around the screws at L3 and I am concerned he may have a pseudoarthrosis I would like to obtain a CT scan of the lumbar spine without contrast in order to evaluate I explained the natural history of lumbar pseudoarthrosis and that there was still a chance that this bone graft could mature and consolidate for the time being his pain is not severe and so he is not interested in further surgery at the moment.  He has my contact information including my cell phone and email address and will keep in close touch as the skin gets done he is traveling to [place] for the next few weeks and so should he have difficulty he will let me know and we will call in some medications.\par \par Orlin Guillory M.D., M.Sc.\par \par Department of Neurosurgery\par [facility]\par [facility]\par [facility]\par [place], [place]\par [email or web address] [phone]

## 2022-04-12 ENCOUNTER — APPOINTMENT (OUTPATIENT)
Dept: ORTHOPEDIC SURGERY | Facility: CLINIC | Age: 57
End: 2022-04-12

## 2022-05-04 ENCOUNTER — RESULT REVIEW (OUTPATIENT)
Age: 57
End: 2022-05-04

## 2022-05-04 ENCOUNTER — OUTPATIENT (OUTPATIENT)
Dept: OUTPATIENT SERVICES | Facility: HOSPITAL | Age: 57
LOS: 1 days | Discharge: HOME | End: 2022-05-04
Payer: COMMERCIAL

## 2022-05-04 DIAGNOSIS — M47.817 SPONDYLOSIS WITHOUT MYELOPATHY OR RADICULOPATHY, LUMBOSACRAL REGION: ICD-10-CM

## 2022-05-04 DIAGNOSIS — Z90.79 ACQUIRED ABSENCE OF OTHER GENITAL ORGAN(S): Chronic | ICD-10-CM

## 2022-05-04 DIAGNOSIS — M48.07 SPINAL STENOSIS, LUMBOSACRAL REGION: ICD-10-CM

## 2022-05-04 DIAGNOSIS — Z98.890 OTHER SPECIFIED POSTPROCEDURAL STATES: Chronic | ICD-10-CM

## 2022-05-04 PROCEDURE — 72131 CT LUMBAR SPINE W/O DYE: CPT | Mod: 26

## 2022-06-02 ENCOUNTER — APPOINTMENT (OUTPATIENT)
Dept: SPINE | Facility: CLINIC | Age: 57
End: 2022-06-02

## 2022-06-16 ENCOUNTER — APPOINTMENT (OUTPATIENT)
Dept: SPINE | Facility: CLINIC | Age: 57
End: 2022-06-16
Payer: COMMERCIAL

## 2022-06-16 DIAGNOSIS — M54.16 RADICULOPATHY, LUMBAR REGION: ICD-10-CM

## 2022-06-16 PROCEDURE — 99214 OFFICE O/P EST MOD 30 MIN: CPT | Mod: 95

## 2022-06-19 PROBLEM — M54.16 LUMBAR RADICULOPATHY: Status: ACTIVE | Noted: 2021-06-29

## 2022-06-19 NOTE — DISCUSSION/SUMMARY
[de-identified] : He does have haloing of his L3 screws and we discussed the risk benefits and alternatives of further surgical management he is going to think about this and discuss with his wife and get back to us.\par \par Orlin Guillory M.D., M.Sc.\par \par Department of Neurosurgery\par [facility]\par [facility]\par [facility]\par [place], [place]\par [email or web address]\par [phone]

## 2022-06-19 NOTE — REASON FOR VISIT
[Home] : at home, [unfilled] , at the time of the visit. [Medical Office: ([facility])___] : at the medical office located in  [Patient] : the patient [Self] : self [Follow-Up Visit] : a follow-up visit for [Back Pain] : back pain [Radiculopathy] : radiculopathy

## 2022-06-19 NOTE — HISTORY OF PRESENT ILLNESS
[de-identified] : The patient returns today for follow-up on his CT scan results he has been doing better and recently traveled to [place] overall he is doing well and has some minimal back stiffness

## 2022-06-21 ENCOUNTER — APPOINTMENT (OUTPATIENT)
Dept: SPINE | Facility: CLINIC | Age: 57
End: 2022-06-21
Payer: COMMERCIAL

## 2022-06-21 DIAGNOSIS — M47.817 SPONDYLOSIS W/OUT MYELOPATHY OR RADICULOPATHY, LUMBOSACRAL REGION: ICD-10-CM

## 2022-06-21 PROCEDURE — 99215 OFFICE O/P EST HI 40 MIN: CPT

## 2022-06-21 NOTE — HISTORY OF PRESENT ILLNESS
[de-identified] : The patient and his wife present for review of his CT scan he is doing well and says that his back pain is dramatically improved and has no leg pain like he was having before

## 2022-06-21 NOTE — DISCUSSION/SUMMARY
[de-identified] : We reviewed his CT scan together in great detail and I explained the natural history of orthopedic spinal instrumentation as well as the risk of pseudoarthrosis.  Were going to watch and wait and we will get repeat x-rays in approximately 6 months I answered all their questions to the best my ability and we will see him back at that point in time.\par \par Orlin Guillory M.D., M.Sc.\par \par Department of Neurosurgery\par [facility]\par [facility]\par [facility]\par [place], [place]\par [email or web address]\par [phone]

## 2022-07-12 ENCOUNTER — NON-APPOINTMENT (OUTPATIENT)
Age: 57
End: 2022-07-12

## 2022-08-30 ENCOUNTER — TRANSCRIPTION ENCOUNTER (OUTPATIENT)
Age: 57
End: 2022-08-30

## 2022-08-30 LAB — PSA, POST - PROSTATECTOMY: <0.01 NG/ML

## 2022-09-06 ENCOUNTER — APPOINTMENT (OUTPATIENT)
Dept: UROLOGY | Facility: CLINIC | Age: 57
End: 2022-09-06

## 2022-09-13 ENCOUNTER — APPOINTMENT (OUTPATIENT)
Dept: UROLOGY | Facility: CLINIC | Age: 57
End: 2022-09-13

## 2022-09-13 VITALS
HEART RATE: 83 BPM | TEMPERATURE: 97.4 F | OXYGEN SATURATION: 98 % | SYSTOLIC BLOOD PRESSURE: 166 MMHG | DIASTOLIC BLOOD PRESSURE: 84 MMHG

## 2022-09-13 PROCEDURE — 99214 OFFICE O/P EST MOD 30 MIN: CPT

## 2022-09-13 RX ORDER — TADALAFIL 20 MG/1
20 TABLET ORAL
Qty: 18 | Refills: 3 | Status: ACTIVE | COMMUNITY
Start: 2022-09-13 | End: 1900-01-01

## 2022-09-13 NOTE — PHYSICAL EXAM
[General Appearance - Well Developed] : well developed [General Appearance - Well Nourished] : well nourished [Normal Appearance] : normal appearance [Well Groomed] : well groomed [General Appearance - In No Acute Distress] : no acute distress [Heart Rate And Rhythm] : Heart rate and rhythm were normal [Edema] : no peripheral edema [] : no respiratory distress [Respiration, Rhythm And Depth] : normal respiratory rhythm and effort [Exaggerated Use Of Accessory Muscles For Inspiration] : no accessory muscle use [Abdomen Soft] : soft [Abdomen Tenderness] : non-tender [Costovertebral Angle Tenderness] : no ~M costovertebral angle tenderness [Urethral Meatus] : meatus normal [Penis Abnormality] : normal uncircumcised penis [Urinary Bladder Findings] : the bladder was normal on palpation [Scrotum] : the scrotum was normal [Epididymis] : the epididymides were normal [Testes Tenderness] : no tenderness of the testes [Testes Mass (___cm)] : there were no testicular masses [Normal Station and Gait] : the gait and station were normal for the patient's age [No Focal Deficits] : no focal deficits [Oriented To Time, Place, And Person] : oriented to person, place, and time [Affect] : the affect was normal [Mood] : the mood was normal [Not Anxious] : not anxious [No Palpable Adenopathy] : no palpable adenopathy

## 2022-09-13 NOTE — HISTORY OF PRESENT ILLNESS
[FreeTextEntry1] : Dr. Reina Anne\par [address]\par [facility] [address]\par \par Dr. Flako Olivas\par [address]\[place] [address]\par \par CC: History of prostate cancer \par \par PSA 6.64 ng/ml 10/2020\par MRI with JACQUIE and NVB involvement  \par [place] 3+4 and 4+3 11/2020\par Left side diffuse \par bone scan 12/10/2020 negative \par \par s/p single port robotic radical prostatectomy April 2021\par G2 on final path; T2, margins negative\par PSA <0.01 ng/ml \par \par Urinary control improved; some post void dribbling\par Some mild leak with movement at times, "couple of drops"\par \par He can achieve orgasm, and "erection is hard, but some shrinkage".  He stated that "even before surgery, after COVID, noticed penis getting smaller" \par He has not tried 20 mg of Cialis yet which we prescribed\par \par FAMHX: negative CAP \par SURG: single port radical prostatectomy \par ALL: NKDA\par SOCIAL: auto-body, , former smoker

## 2022-09-13 NOTE — ASSESSMENT
[FreeTextEntry1] : Diagnosis: History of prostate cancer\par \par Plan\par 20 mg of tadalafil\par Follow up annually with PSA\par \par Everardo Tovar MD, FRCS \par  of Urology [facility]\par Director of Laparoscopic and Robotic Surgery \par [facility] Director of Urology, [facility] \par Professor of Urology\par \par (Office) \par (Cell)  [phone] \par [email or web address]\par \par \par

## 2023-01-11 ENCOUNTER — OUTPATIENT (OUTPATIENT)
Dept: OUTPATIENT SERVICES | Facility: HOSPITAL | Age: 58
LOS: 1 days | End: 2023-01-11
Payer: COMMERCIAL

## 2023-01-11 ENCOUNTER — APPOINTMENT (OUTPATIENT)
Dept: ORTHOPEDIC SURGERY | Facility: CLINIC | Age: 58
End: 2023-01-11
Payer: COMMERCIAL

## 2023-01-11 ENCOUNTER — RESULT REVIEW (OUTPATIENT)
Age: 58
End: 2023-01-11

## 2023-01-11 VITALS
HEART RATE: 53 BPM | OXYGEN SATURATION: 96 % | SYSTOLIC BLOOD PRESSURE: 127 MMHG | BODY MASS INDEX: 33.77 KG/M2 | WEIGHT: 228 LBS | DIASTOLIC BLOOD PRESSURE: 82 MMHG | HEIGHT: 69 IN

## 2023-01-11 DIAGNOSIS — Z90.79 ACQUIRED ABSENCE OF OTHER GENITAL ORGAN(S): Chronic | ICD-10-CM

## 2023-01-11 DIAGNOSIS — M16.11 UNILATERAL PRIMARY OSTEOARTHRITIS, RIGHT HIP: ICD-10-CM

## 2023-01-11 DIAGNOSIS — Z98.890 OTHER SPECIFIED POSTPROCEDURAL STATES: Chronic | ICD-10-CM

## 2023-01-11 DIAGNOSIS — M25.551 PAIN IN RIGHT HIP: ICD-10-CM

## 2023-01-11 DIAGNOSIS — Z96.641 AFTERCARE FOLLOWING JOINT REPLACEMENT SURGERY: ICD-10-CM

## 2023-01-11 DIAGNOSIS — Z47.1 AFTERCARE FOLLOWING JOINT REPLACEMENT SURGERY: ICD-10-CM

## 2023-01-11 PROCEDURE — 73502 X-RAY EXAM HIP UNI 2-3 VIEWS: CPT | Mod: 26,RT

## 2023-01-11 PROCEDURE — 73502 X-RAY EXAM HIP UNI 2-3 VIEWS: CPT

## 2023-01-11 PROCEDURE — 99213 OFFICE O/P EST LOW 20 MIN: CPT

## 2023-01-12 PROBLEM — M16.11 PRIMARY OSTEOARTHRITIS OF RIGHT HIP: Noted: 2021-09-08

## 2023-01-12 PROBLEM — M25.551 HIP PAIN, RIGHT: Noted: 2021-09-07

## 2023-01-12 PROBLEM — Z47.1 AFTERCARE FOLLOWING RIGHT HIP JOINT REPLACEMENT SURGERY: Status: ACTIVE | Noted: 2021-12-30

## 2023-01-12 NOTE — END OF VISIT
[FreeTextEntry3] : All medical record entries made by the Scribe were at my, Dr. Maciel Rao, direction and personally dictated by me on 01/11/2023. I have reviewed the chart and agree that the record accurately reflects my personal performance of the history, physical exam, assessment and plan. I have also personally directed, reviewed, and agreed with the chart.

## 2023-01-12 NOTE — DISCUSSION/SUMMARY
[de-identified] : 56 y/o male 1 year s/p right OSBALDO doing well. \par - We was encouraged to continue HEP, activities as tolerated, and f/u annually with repeat left hip XR or earlier as needed.

## 2023-01-12 NOTE — PHYSICAL EXAM
[de-identified] : General appearance: well nourished and hydrated, pleasant, alert and oriented x 3, cooperative.  \par HEENT: normocephalic, EOM intact, wearing mask, external auditory canal clear.  \par Cardiovascular: no lower leg edema, no varicosities, dorsalis pedis pulses palpable and symmetric.  \par Lymphatics: no palpable lymphadenopathy, no lymphedema.  \par Neurologic: sensation is normal, no muscle weakness in upper or lower extremities, patella tendon reflexes present and symmetric.  \par Dermatologic: skin moist, warm, no rash.  \par Spine: cervical spine with normal lordosis and painless range of motion, thoracic spine with normal kyphosis and painless range of motion, lumbosacral spine with normal lordosis and painless range of motion.  No tenderness to palpation along midline spine and paraspinal musculature.  Sacroiliac joints nontender bilaterally. Negative SLR and crossed SLR tests bilaterally.\par Gait: normal.  \par \par Right hip:\par - Swelling: none\par - Ecchymosis: none\par - Erythema: none\par - Wounds: healed anterior incision\par - Tenderness: none\par - ROM: 105 flexion, 0 extension, 10 adduction, 45 abduction, 10 internal rotation, 50 external rotation\par - KARYN: painless\par - FADIR: painless\par - Yaritaz: negative\par - Stinchfield: negative\par - Flexor power: 5/5\par - Abductor power: 5/5 [de-identified] : Pelvis and right hip radiographs taken today demonstrate stable position of the right OSBALDO as compared to prior imaging without evidence of mechanical complication. Stable appearance noted as well of the lumbopelvic fusion hardware and moderate left hip osteoarthritis.

## 2023-01-12 NOTE — HISTORY OF PRESENT ILLNESS
[de-identified] : R OSBALDO, 12/30/21\par \par 01/11/2023: 58 y/o male presenting for 1 year f/u s/p right OSBALDO doing well. He denies pain or symptoms. He is continuing to exercise with walking. He is not currently taking any pain medications. He has no other new complaints today.\par \par 4/6/22: Doing well. Right hip pain minimal now. Ambulating without assistive device, trying to get his step count up. Doing HEP. \par \par 12/6/21: Presenting back for wound check of the lumbar spine as requested by me. Right hip symptoms about the same as prior. Has some painless hours but overall feels highly limited on a day to day basis and is looking forward to the OSBALDO planned for 12/30/21. Notes some weight gain over the past several months despite what he believes is a healthy diet.\par \par 9/20/21: 55y/o male referred by Dr. Guillory for right hip pain progressive for several months. 50% pain relief from right hip CSI on 9/16/21. S/p lumbar fusion, doing well with respect to back pain and leg weakness but persistent wound healing issues with multiple dry scabs; following with Dr. Salcedo for this. Also recently underwent prostate surgery for CA. Takes only pregabalin for pain. Has not been doing any PT. Ambulatory tolerance was less than a block with cane prior to the CSI; now maybe a block and a half without the cane. Previously was very active and is very frustrated by his inability to get back to his prior level of activity.\par \par PMH includes prostate CA as above as well as depression.

## 2023-02-03 NOTE — ED ADULT TRIAGE NOTE - HEIGHT IN FEET
5 Benzoyl Peroxide Pregnancy And Lactation Text: This medication is Pregnancy Category C. It is unknown if benzoyl peroxide is excreted in breast milk.

## 2023-02-21 NOTE — ASU PREOP CHECKLIST - ALLERGIES REVIEWED
done Calcipotriene Counseling:  I discussed with the patient the risks of calcipotriene including but not limited to erythema, scaling, itching, and irritation.

## 2023-03-10 NOTE — PRE-OP CHECKLIST - VIA
Duration Of Freeze Thaw-Cycle (Seconds): 3 Show Aperture Variable?: Yes Post-Care Instructions: I reviewed with the patient in detail post-care instructions. Patient is to wear sunprotection, and avoid picking at any of the treated lesions. Pt may apply Vaseline to crusted or scabbing areas. Application Tool (Optional): Cry-AC Render Note In Bullet Format When Appropriate: No Aperture Size (Optional): B Consent: The patient's consent was obtained including but not limited to risks of crusting, scabbing, blistering, scarring, darker or lighter pigmentary change, recurrence, incomplete removal and infection. Detail Level: Simple Number Of Freeze-Thaw Cycles: 3 freeze-thaw cycles ambulate

## 2023-03-17 LAB — PSA, POST - PROSTATECTOMY: <0.01 NG/ML

## 2023-05-04 ENCOUNTER — APPOINTMENT (OUTPATIENT)
Dept: CARDIOLOGY | Facility: CLINIC | Age: 58
End: 2023-05-04
Payer: COMMERCIAL

## 2023-05-04 VITALS
HEART RATE: 55 BPM | WEIGHT: 230 LBS | SYSTOLIC BLOOD PRESSURE: 147 MMHG | DIASTOLIC BLOOD PRESSURE: 89 MMHG | BODY MASS INDEX: 34.07 KG/M2 | OXYGEN SATURATION: 95 % | HEIGHT: 69 IN

## 2023-05-04 DIAGNOSIS — I10 ESSENTIAL (PRIMARY) HYPERTENSION: ICD-10-CM

## 2023-05-04 DIAGNOSIS — E78.5 HYPERLIPIDEMIA, UNSPECIFIED: ICD-10-CM

## 2023-05-04 PROCEDURE — 99204 OFFICE O/P NEW MOD 45 MIN: CPT

## 2023-05-04 RX ORDER — MELOXICAM 15 MG/1
15 TABLET ORAL DAILY
Qty: 30 | Refills: 2 | Status: DISCONTINUED | COMMUNITY
Start: 2021-09-20 | End: 2023-05-04

## 2023-05-04 RX ORDER — RIVAROXABAN 10 MG/1
10 TABLET, FILM COATED ORAL
Qty: 30 | Refills: 0 | Status: DISCONTINUED | COMMUNITY
Start: 2021-12-30 | End: 2023-05-04

## 2023-05-04 RX ORDER — OXYCODONE HYDROCHLORIDE 5 MG/1
5 CAPSULE ORAL
Qty: 20 | Refills: 0 | Status: DISCONTINUED | COMMUNITY
Start: 2021-08-01 | End: 2023-05-04

## 2023-05-04 RX ORDER — CELECOXIB 200 MG/1
200 CAPSULE ORAL TWICE DAILY
Qty: 60 | Refills: 2 | Status: DISCONTINUED | COMMUNITY
Start: 2021-12-30 | End: 2023-05-04

## 2023-05-04 RX ORDER — ACETAMINOPHEN 500 MG/1
500 TABLET ORAL
Qty: 180 | Refills: 2 | Status: DISCONTINUED | COMMUNITY
Start: 2021-12-30 | End: 2023-05-04

## 2023-05-04 RX ORDER — ESCITALOPRAM OXALATE 5 MG/1
5 TABLET ORAL
Qty: 30 | Refills: 0 | Status: DISCONTINUED | COMMUNITY
Start: 2021-06-24 | End: 2023-05-04

## 2023-05-04 RX ORDER — PANTOPRAZOLE 40 MG/1
40 TABLET, DELAYED RELEASE ORAL DAILY
Qty: 30 | Refills: 0 | Status: DISCONTINUED | COMMUNITY
Start: 2021-12-30 | End: 2023-05-04

## 2023-05-04 RX ORDER — ONDANSETRON 4 MG/1
4 TABLET, ORALLY DISINTEGRATING ORAL
Qty: 18 | Refills: 0 | Status: DISCONTINUED | COMMUNITY
Start: 2021-08-02 | End: 2023-05-04

## 2023-05-05 NOTE — HISTORY OF PRESENT ILLNESS
[FreeTextEntry1] : CIRA HODGE is a 57-year-old male, with a PMHx significant for HTN and HLD (not on statin therapy), who presents today for cardiac evaluation. Endorses SOB with exertion, which is relieved with rest. Patient has a known hx of HLD for which he is not on statin therapy. States he has been told he has high lipid levels. Denies any other complaints. Otherwise: (-) chest pain, (-) cough, (-) hemoptysis, (-) leg swelling/pain, (-) recent travel, (-) prolonged immobility.

## 2023-05-05 NOTE — DISCUSSION/SUMMARY
[FreeTextEntry1] : SOB on Exertion: Due to exertional nature of symptoms, recommend a stress echocardiogram to evaluate for exercise-induced symptoms.\par \par HLD: The impression is hyperlipidemia. There are no changes in medication management at this time. Recommend a CAC score to risk stratify the patient.\par \par HTN: The impression is hypertension. Currently, the condition is stable. There are no changes in medication management. Continue current medical therapy. Other planned treatments include an exercise regimen, weight loss, low sodium diet, and alcohol moderation.\par \par Instructed to follow up after testing is complete. \par Plan was discussed with the patient.

## 2023-05-06 ENCOUNTER — RESULT REVIEW (OUTPATIENT)
Age: 58
End: 2023-05-06

## 2023-05-06 ENCOUNTER — OUTPATIENT (OUTPATIENT)
Dept: OUTPATIENT SERVICES | Facility: HOSPITAL | Age: 58
LOS: 1 days | End: 2023-05-06
Payer: SELF-PAY

## 2023-05-06 DIAGNOSIS — Z90.79 ACQUIRED ABSENCE OF OTHER GENITAL ORGAN(S): Chronic | ICD-10-CM

## 2023-05-06 DIAGNOSIS — Z00.8 ENCOUNTER FOR OTHER GENERAL EXAMINATION: ICD-10-CM

## 2023-05-06 DIAGNOSIS — E78.5 HYPERLIPIDEMIA, UNSPECIFIED: ICD-10-CM

## 2023-05-06 DIAGNOSIS — Z98.890 OTHER SPECIFIED POSTPROCEDURAL STATES: Chronic | ICD-10-CM

## 2023-05-06 PROCEDURE — 75571 CT HRT W/O DYE W/CA TEST: CPT | Mod: 26

## 2023-05-06 PROCEDURE — 75571 CT HRT W/O DYE W/CA TEST: CPT

## 2023-05-07 DIAGNOSIS — E78.5 HYPERLIPIDEMIA, UNSPECIFIED: ICD-10-CM

## 2023-05-16 NOTE — ED ADULT TRIAGE NOTE - INTERNATIONAL TRAVEL
Health Maintenance   Topic Date Due    MEDICARE ANNUAL WELLNESS VISIT  07/29/2021    COVID-19 Vaccine (6 - Pfizer series) 02/28/2023    TSH W/FREE T4 REFLEX  04/22/2023    FALL RISK ASSESSMENT  06/29/2023    DTAP/TDAP/TD IMMUNIZATION (2 - Td or Tdap) 02/02/2025    ADVANCE CARE PLANNING  08/04/2026    DEXA  05/01/2031    PHQ-2 (once per calendar year)  Completed    INFLUENZA VACCINE  Completed    Pneumococcal Vaccine: 65+ Years  Completed    ZOSTER IMMUNIZATION  Completed    IPV IMMUNIZATION  Aged Out    MENINGITIS IMMUNIZATION  Aged Out    MAMMO SCREENING  Discontinued       No

## 2023-06-02 DIAGNOSIS — R06.02 SHORTNESS OF BREATH: ICD-10-CM

## 2023-06-16 ENCOUNTER — APPOINTMENT (OUTPATIENT)
Dept: NEUROLOGY | Facility: CLINIC | Age: 58
End: 2023-06-16
Payer: COMMERCIAL

## 2023-06-16 DIAGNOSIS — F41.9 ANXIETY DISORDER, UNSPECIFIED: ICD-10-CM

## 2023-06-16 PROCEDURE — 99203 OFFICE O/P NEW LOW 30 MIN: CPT

## 2023-06-16 RX ORDER — ESCITALOPRAM OXALATE 20 MG/1
20 TABLET ORAL DAILY
Qty: 60 | Refills: 0 | Status: ACTIVE | COMMUNITY
Start: 2023-06-16 | End: 1900-01-01

## 2023-07-24 ENCOUNTER — APPOINTMENT (OUTPATIENT)
Dept: CARDIOLOGY | Facility: CLINIC | Age: 58
End: 2023-07-24

## 2023-07-24 ENCOUNTER — APPOINTMENT (OUTPATIENT)
Dept: CARDIOLOGY | Facility: CLINIC | Age: 58
End: 2023-07-24
Payer: COMMERCIAL

## 2023-07-24 PROCEDURE — 93015 CV STRESS TEST SUPVJ I&R: CPT

## 2023-08-04 ENCOUNTER — APPOINTMENT (OUTPATIENT)
Dept: CARDIOLOGY | Facility: CLINIC | Age: 58
End: 2023-08-04
Payer: COMMERCIAL

## 2023-08-04 PROCEDURE — 93306 TTE W/DOPPLER COMPLETE: CPT

## 2023-08-05 NOTE — ASSESSMENT
[FreeTextEntry1] : anxiety -lexapro refilled, but patient advised to follow up with a psychiatrist for anxiety.

## 2023-08-05 NOTE — HISTORY OF PRESENT ILLNESS
[FreeTextEntry1] : Mr. Mao Is a 57 year old man who comes in for Anxiety.   His anxiety has been managed by his previous neurologist who has retired.

## 2023-08-24 NOTE — DISCHARGE NOTE PROVIDER - CARE PROVIDERS DIRECT ADDRESSES
,[email or web address] Albendazole Counseling:  I discussed with the patient the risks of albendazole including but not limited to cytopenia, kidney damage, nausea/vomiting and severe allergy.  The patient understands that this medication is being used in an off-label manner.

## 2023-09-05 ENCOUNTER — APPOINTMENT (OUTPATIENT)
Dept: UROLOGY | Facility: CLINIC | Age: 58
End: 2023-09-05

## 2023-09-25 ENCOUNTER — APPOINTMENT (OUTPATIENT)
Dept: UROLOGY | Facility: CLINIC | Age: 58
End: 2023-09-25
Payer: COMMERCIAL

## 2023-09-25 VITALS
HEIGHT: 69 IN | TEMPERATURE: 97.8 F | SYSTOLIC BLOOD PRESSURE: 118 MMHG | BODY MASS INDEX: 29.47 KG/M2 | HEART RATE: 51 BPM | DIASTOLIC BLOOD PRESSURE: 71 MMHG | WEIGHT: 199 LBS

## 2023-09-25 DIAGNOSIS — N52.31 ERECTILE DYSFUNCTION FOLLOWING RADICAL PROSTATECTOMY: ICD-10-CM

## 2023-09-25 LAB
BILIRUB UR QL STRIP: NORMAL
COLLECTION METHOD: NORMAL
GLUCOSE UR-MCNC: NORMAL
HCG UR QL: 0.2
HGB UR QL STRIP.AUTO: NORMAL
KETONES UR-MCNC: NORMAL
LEUKOCYTE ESTERASE UR QL STRIP: NORMAL
NITRITE UR QL STRIP: NORMAL
PH UR STRIP: 5.5
PROT UR STRIP-MCNC: NORMAL
SP GR UR STRIP: 1.02

## 2023-09-25 PROCEDURE — 81003 URINALYSIS AUTO W/O SCOPE: CPT | Mod: QW

## 2023-09-25 PROCEDURE — 99213 OFFICE O/P EST LOW 20 MIN: CPT

## 2023-09-25 RX ORDER — TADALAFIL 5 MG/1
5 TABLET ORAL
Qty: 90 | Refills: 3 | Status: ACTIVE | COMMUNITY
Start: 2023-09-25 | End: 1900-01-01

## 2023-10-02 ENCOUNTER — RX RENEWAL (OUTPATIENT)
Age: 58
End: 2023-10-02

## 2023-10-02 RX ORDER — AMLODIPINE BESYLATE 5 MG/1
5 TABLET ORAL
Qty: 90 | Refills: 2 | Status: ACTIVE | COMMUNITY
Start: 1900-01-01 | End: 1900-01-01

## 2023-10-23 LAB — PSA, POST - PROSTATECTOMY: <0.01 NG/ML

## 2023-10-27 ENCOUNTER — APPOINTMENT (OUTPATIENT)
Dept: UROLOGY | Facility: CLINIC | Age: 58
End: 2023-10-27
Payer: COMMERCIAL

## 2023-10-27 DIAGNOSIS — M48.00 SPINAL STENOSIS, SITE UNSPECIFIED: ICD-10-CM

## 2023-10-27 DIAGNOSIS — N39.3 STRESS INCONTINENCE (FEMALE) (MALE): ICD-10-CM

## 2023-10-27 DIAGNOSIS — C61 MALIGNANT NEOPLASM OF PROSTATE: ICD-10-CM

## 2023-10-27 PROCEDURE — 99214 OFFICE O/P EST MOD 30 MIN: CPT

## 2023-10-30 PROBLEM — C61 PROSTATE CANCER: Status: ACTIVE | Noted: 2020-11-24

## 2023-10-30 PROBLEM — M48.00 SPINAL STENOSIS: Status: ACTIVE | Noted: 2020-09-15

## 2023-10-30 PROBLEM — N39.3 MALE STRESS INCONTINENCE: Status: ACTIVE | Noted: 2023-10-01

## 2024-01-12 ENCOUNTER — APPOINTMENT (OUTPATIENT)
Dept: ORTHOPEDIC SURGERY | Facility: CLINIC | Age: 59
End: 2024-01-12

## 2024-02-09 ENCOUNTER — APPOINTMENT (OUTPATIENT)
Dept: CARDIOLOGY | Facility: CLINIC | Age: 59
End: 2024-02-09

## 2024-03-25 ENCOUNTER — APPOINTMENT (OUTPATIENT)
Dept: UROLOGY | Facility: CLINIC | Age: 59
End: 2024-03-25

## 2025-01-06 NOTE — ED ADULT TRIAGE NOTE - SOURCE OF INFORMATION
Darryn was returning Linda's call regarding lab results, please call him back at [phone]   Patient

## 2025-02-28 NOTE — PROGRESS NOTE ADULT - NSICDXPILOT_GEN_ALL_CORE
[place]
Rx Refill Request     Name: Franca Burger  :  1996   Medication Name:  tiZANidine (Zanaflex) 4 mg tablet   Specific Pharmacy location:  [place], [place]   Date of last appointment:  2025   Date of next appointment:  2025   Best number to reach patient:  [phone]     
[place]

## 2025-06-17 NOTE — PACU DISCHARGE NOTE - COMMENTS
REport given to Jennifer BLUNT. VSS. Fressing to right Hip Dry and Intact. IV site intact. Tolerating PO. Comfortable. Transported in bed on  Room air
stairs to enter home

## 2025-09-18 ENCOUNTER — NON-APPOINTMENT (OUTPATIENT)
Age: 60
End: 2025-09-18